# Patient Record
Sex: FEMALE | Race: WHITE | NOT HISPANIC OR LATINO | Employment: FULL TIME | URBAN - METROPOLITAN AREA
[De-identification: names, ages, dates, MRNs, and addresses within clinical notes are randomized per-mention and may not be internally consistent; named-entity substitution may affect disease eponyms.]

---

## 2019-03-26 ENCOUNTER — OFFICE VISIT (OUTPATIENT)
Dept: URGENT CARE | Facility: CLINIC | Age: 39
End: 2019-03-26
Payer: MEDICAID

## 2019-03-26 VITALS
HEART RATE: 103 BPM | TEMPERATURE: 99 F | SYSTOLIC BLOOD PRESSURE: 115 MMHG | BODY MASS INDEX: 22.2 KG/M2 | RESPIRATION RATE: 14 BRPM | DIASTOLIC BLOOD PRESSURE: 74 MMHG | OXYGEN SATURATION: 100 % | HEIGHT: 64 IN | WEIGHT: 130 LBS

## 2019-03-26 DIAGNOSIS — J02.9 SORE THROAT: ICD-10-CM

## 2019-03-26 DIAGNOSIS — R05.9 COUGH: ICD-10-CM

## 2019-03-26 DIAGNOSIS — R68.89 FLU-LIKE SYMPTOMS: Primary | ICD-10-CM

## 2019-03-26 LAB
CTP QC/QA: YES
CTP QC/QA: YES
FLUAV AG NPH QL: NEGATIVE
FLUBV AG NPH QL: NEGATIVE
S PYO RRNA THROAT QL PROBE: NEGATIVE

## 2019-03-26 PROCEDURE — 87804 POCT INFLUENZA A/B: ICD-10-PCS | Mod: 59,QW,S$GLB, | Performed by: PHYSICIAN ASSISTANT

## 2019-03-26 PROCEDURE — 87804 INFLUENZA ASSAY W/OPTIC: CPT | Mod: QW,S$GLB,, | Performed by: PHYSICIAN ASSISTANT

## 2019-03-26 PROCEDURE — 99203 PR OFFICE/OUTPT VISIT, NEW, LEVL III, 30-44 MIN: ICD-10-PCS | Mod: S$GLB,,, | Performed by: PHYSICIAN ASSISTANT

## 2019-03-26 PROCEDURE — 87880 STREP A ASSAY W/OPTIC: CPT | Mod: QW,S$GLB,, | Performed by: PHYSICIAN ASSISTANT

## 2019-03-26 PROCEDURE — 99203 OFFICE O/P NEW LOW 30 MIN: CPT | Mod: S$GLB,,, | Performed by: PHYSICIAN ASSISTANT

## 2019-03-26 PROCEDURE — 87880 POCT RAPID STREP A: ICD-10-PCS | Mod: QW,S$GLB,, | Performed by: PHYSICIAN ASSISTANT

## 2019-03-26 RX ORDER — FLUTICASONE PROPIONATE 50 MCG
2 SPRAY, SUSPENSION (ML) NASAL DAILY
Qty: 1 BOTTLE | Refills: 0 | Status: SHIPPED | OUTPATIENT
Start: 2019-03-26 | End: 2020-11-04

## 2019-03-26 NOTE — PATIENT INSTRUCTIONS
Make sure you are getting rest and drinking lots of fluids.    You can treat your symptoms with DayQuil maximum strength, NyQuil, Cepacol and/or cough drops.  You can also take Emergen-C to help boost your immune system.    You have been prescribed Flonase to help with nasal symptoms and post-nasal drip.    You can also take Ibuprofen for body aches/fever control.    Follow-up with primary care.    If for some reason your symptoms worsen or for any new or concerning symptoms, please return to this emergency room.        Influenza (Adult)    Influenza is also called the flu. It is a viral illness that affects the air passages of your lungs. It is different from the common cold. The flu can easily be passed from one to person to another. It may be spread through the air by coughing and sneezing. Or it can be spread by touching the sick person and then touching your own eyes, nose, or mouth.  The flu starts 1 to 3 days after you are exposed to the flu virus. It may last for 1 to 2 weeks but many people feel tired or fatigued for many weeks afterward. You usually dont need to take antibiotics unless you have a complication. This might be an ear or sinus infection or pneumonia.  Symptoms of the flu may be mild or severe. They can include extreme tiredness (wanting to stay in bed all day), chills, fevers, muscle aches, soreness with eye movement, headache, and a dry, hacking cough.  Home care  Follow these guidelines when caring for yourself at home:  · Avoid being around cigarette smoke, whether yours or other peoples.  · Acetaminophen or ibuprofen will help ease your fever, muscle aches, and headache. Dont give aspirin to anyone younger than 18 who has the flu. Aspirin can harm the liver.  · Nausea and loss of appetite are common with the flu. Eat light meals. Drink 6 to 8 glasses of liquids every day. Good choices are water, sport drinks, soft drinks without caffeine, juices, tea, and soup. Extra fluids will also  "help loosen secretions in your nose and lungs.  · Over-the-counter cold medicines will not make the flu go away faster. But the medicines may help with coughing, sore throat, and congestion in your nose and sinuses. Dont use a decongestant if you have high blood pressure.  · Stay home until your fever has been gone for at least 24 hours without using medicine to reduce fever.  Follow-up care  Follow up with your healthcare provider, or as advised, if you are not getting better over the next week.  If you are age 65 or older, talk with your provider about getting a pneumococcal vaccine every 5 years. You should also get this vaccine if you have chronic asthma or COPD. All adults should get a flu vaccine every fall. Ask your provider about this.  When to seek medical advice  Call your healthcare provider right away if any of these occur:  · Cough with lots of colored mucus (sputum) or blood in your mucus  · Chest pain, shortness of breath, wheezing, or trouble breathing  · Severe headache, or face, neck, or ear pain  · New rash with fever  · Fever of 100.4°F (38°C) or higher, or as directed by your healthcare provider  · Confusion, behavior change, or seizure  · Severe weakness or dizziness  · You get a new fever or cough after getting better for a few days  Date Last Reviewed: 1/1/2017  © 8319-5503 [employer]. [address] [place] [address], [place] [address]. All rights reserved. This information is not intended as a substitute for professional medical care. Always follow your healthcare professional's instructions.        Viral Syndrome (Adult)  A viral illness may cause a number of symptoms. The symptoms depend on the part of the body that the virus affects. If it settles in your nose, throat, and lungs, it may cause cough, sore throat, congestion, and sometimes headache. If it settles in your stomach and intestinal tract, it may cause vomiting and diarrhea. Sometimes it causes vague symptoms like "aching " "all over," feeling tired, loss of appetite, or fever.  A viral illness usually lasts 1 to 2 weeks, but sometimes it lasts longer. In some cases, a more serious infection can look like a viral syndrome in the first few days of the illness. You may need another exam and additional tests to know the difference. Watch for the warning signs listed below.  Home care  Follow these guidelines for taking care of yourself at home:  · If symptoms are severe, rest at home for the first 2 to 3 days.  · Stay away from cigarette smoke - both your smoke and the smoke from others.  · You may use over-the-counter acetaminophen or ibuprofen for fever, muscle aching, and headache, unless another medicine was prescribed for this. If you have chronic liver or kidney disease or ever had a stomach ulcer or GI bleeding, talk with your doctor before using these medicines. No one who is younger than 18 and ill with a fever should take aspirin. It may cause severe disease or death.  · Your appetite may be poor, so a light diet is fine. Avoid dehydration by drinking 8 to 12 8-ounce glasses of fluids each day. This may include water; orange juice; lemonade; apple, grape, and cranberry juice; clear fruit drinks; electrolyte replacement and sports drinks; and decaffeinated teas and coffee. If you have been diagnosed with a kidney disease, ask your doctor how much and what types of fluids you should drink to prevent dehydration. If you have kidney disease, drinking too much fluid can cause it build up in the your body and be dangerous to your health.  · Over-the-counter remedies won't shorten the length of the illness but may be helpful for cough, sore throat; and nasal and sinus congestion. Don't use decongestants if you have high blood pressure.  Follow-up care  Follow up with your healthcare provider if you do not improve over the next week.  Call 911  Get emergency medical care if any of the following occur:  · Convulsion  · Feeling weak, " dizzy, or like you are going to faint  · Chest pain, shortness of breath, wheezing, or difficulty breathing  When to seek medical advice  Call your healthcare provider right away if any of these occur:  · Cough with lots of colored sputum (mucus) or blood in your sputum  · Chest pain, shortness of breath, wheezing, or difficulty breathing  · Severe headache; face, neck, or ear pain  · Severe, constant pain in the lower right side of your belly (abdominal)  · Continued vomiting (cant keep liquids down)  · Frequent diarrhea (more than 5 times a day); blood (red or black color) or mucus in diarrhea  · Feeling weak, dizzy, or like you are going to faint  · Extreme thirst  · Fever of 100.4°F (38°C) or higher, or as directed by your healthcare provider  Date Last Reviewed: 9/25/2015  © 6861-8254 [employer]. [address] [place] [address]. All rights reserved. This information is not intended as a substitute for professional medical care. Always follow your healthcare professional's instructions.

## 2019-03-26 NOTE — PROGRESS NOTES
"Subjective:       Patient ID: Rhonda Ahuja is a 39 y.o. female.    Vitals:  height is 5' 4" (1.626 m) and weight is 59 kg (130 lb). Her oral temperature is 99 °F (37.2 °C). Her blood pressure is 115/74 and her pulse is 103. Her respiration is 14 and oxygen saturation is 100%.     Chief Complaint: Cough    Cough   This is a new problem. Episode onset: 3 days. The problem has been gradually worsening. The problem occurs every few minutes. The cough is non-productive. Associated symptoms include chills, ear congestion, ear pain, myalgias, nasal congestion, postnasal drip and a sore throat. Pertinent negatives include no chest pain, eye redness, fever, headaches, hemoptysis, rash, shortness of breath or wheezing. The symptoms are aggravated by lying down. Treatments tried: Tylenol Cold and Flu. The treatment provided mild relief.       Constitution: Positive for appetite change, chills and generalized weakness. Negative for sweating, fatigue and fever.   HENT: Positive for ear pain, congestion, postnasal drip, sinus pressure and sore throat. Negative for ear discharge, dental problem, drooling, tongue pain, tongue lesion, foreign body in nose, sinus pain and voice change.    Neck: Negative for neck pain and painful lymph nodes.   Cardiovascular: Negative for chest pain and palpitations.   Eyes: Negative for eye pain and eye redness.   Respiratory: Positive for cough. Negative for chest tightness, sputum production, bloody sputum, COPD, shortness of breath, stridor, wheezing and asthma.    Gastrointestinal: Negative for abdominal pain, nausea, vomiting, diarrhea and bright red blood in stool.   Genitourinary: Negative for dysuria, vaginal pain, vaginal discharge and vaginal bleeding.   Musculoskeletal: Positive for pain, back pain and muscle ache. Negative for trauma.   Skin: Negative for rash.   Allergic/Immunologic: Negative for seasonal allergies and asthma.   Neurological: Negative for headaches. "   Hematologic/Lymphatic: Negative for swollen lymph nodes.       Objective:      Physical Exam   Constitutional: She is oriented to person, place, and time. Vital signs are normal. She appears well-developed and well-nourished. She is cooperative.  Non-toxic appearance. She does not have a sickly appearance. She does not appear ill. No distress.   HENT:   Head: Normocephalic and atraumatic.   Right Ear: Tympanic membrane, external ear and ear canal normal.   Left Ear: Tympanic membrane, external ear and ear canal normal.   Nose: Nose normal. No mucosal edema, rhinorrhea or nasal deformity. No epistaxis. Right sinus exhibits no maxillary sinus tenderness and no frontal sinus tenderness. Left sinus exhibits no maxillary sinus tenderness and no frontal sinus tenderness.   Mouth/Throat: Uvula is midline and mucous membranes are normal. No trismus in the jaw. Normal dentition. No uvula swelling. Posterior oropharyngeal erythema present. No oropharyngeal exudate, posterior oropharyngeal edema or tonsillar abscesses. Tonsils are 0 on the right. Tonsils are 0 on the left. No tonsillar exudate.   + nasal congestion   Eyes: Pupils are equal, round, and reactive to light. Conjunctivae, EOM and lids are normal. Right eye exhibits no discharge. Left eye exhibits no discharge. No scleral icterus.   Sclera clear bilat   Neck: Trachea normal, normal range of motion, full passive range of motion without pain and phonation normal. Neck supple.   Cardiovascular: Normal rate, regular rhythm, normal heart sounds, intact distal pulses and normal pulses.   Pulmonary/Chest: Effort normal and breath sounds normal. No stridor. No respiratory distress. She has no decreased breath sounds. She has no wheezes. She has no rhonchi. She has no rales.   Abdominal: Soft. Normal appearance and bowel sounds are normal. She exhibits no distension, no pulsatile midline mass and no mass. There is no tenderness. There is no rigidity, no rebound, no  guarding and no CVA tenderness.   Musculoskeletal: Normal range of motion. She exhibits no edema or deformity.   Lymphadenopathy:     She has no cervical adenopathy.   Neurological: She is alert and oriented to person, place, and time. She has normal strength. No cranial nerve deficit or sensory deficit. She exhibits normal muscle tone. Coordination normal. GCS eye subscore is 4. GCS verbal subscore is 5. GCS motor subscore is 6.   Skin: Skin is warm, dry and intact. No rash noted. She is not diaphoretic. No pallor.   Psychiatric: She has a normal mood and affect. Her speech is normal and behavior is normal. Judgment and thought content normal. Cognition and memory are normal.   Nursing note and vitals reviewed.      Assessment:       1. Flu-like symptoms    2. Cough    3. Sore throat        Plan:         Flu-like symptoms    Cough  -     POCT Influenza A/B    Sore throat  -     POCT rapid strep A    Other orders  -     fluticasone (FLONASE) 50 mcg/actuation nasal spray; 2 sprays (100 mcg total) by Each Nare route once daily.  Dispense: 1 Bottle; Refill: 0      Results for orders placed or performed in visit on 03/26/19   POCT Influenza A/B   Result Value Ref Range    Rapid Influenza A Ag Negative Negative    Rapid Influenza B Ag Negative Negative     Acceptable Yes    POCT rapid strep A   Result Value Ref Range    Rapid Strep A Screen Negative Negative     Acceptable Yes       Patient's boyfriend had the flu 1 week ago.  Although influenza swab was negative today, I will treat flu-like symptoms due to exposure.  I will recommend OTC DayQuil, NyQuil, cough drops, Flonase and rest.  I will recommend increase fluid intake.  I will recommend follow-up with primary care.  I feel this patient is stable for discharge.    Make sure you are getting rest and drinking lots of fluids.    You can treat your symptoms with DayQuil maximum strength, NyQuil, Cepacol and/or cough drops.  You can also  take Emergen-C to help boost your immune system.    You have been prescribed Flonase to help with nasal symptoms and post-nasal drip.    You can also take Ibuprofen for body aches/fever control.    Follow-up with primary care.    If for some reason your symptoms worsen or for any new or concerning symptoms, please return to this emergency room.        Influenza (Adult)    Influenza is also called the flu. It is a viral illness that affects the air passages of your lungs. It is different from the common cold. The flu can easily be passed from one to person to another. It may be spread through the air by coughing and sneezing. Or it can be spread by touching the sick person and then touching your own eyes, nose, or mouth.  The flu starts 1 to 3 days after you are exposed to the flu virus. It may last for 1 to 2 weeks but many people feel tired or fatigued for many weeks afterward. You usually dont need to take antibiotics unless you have a complication. This might be an ear or sinus infection or pneumonia.  Symptoms of the flu may be mild or severe. They can include extreme tiredness (wanting to stay in bed all day), chills, fevers, muscle aches, soreness with eye movement, headache, and a dry, hacking cough.  Home care  Follow these guidelines when caring for yourself at home:  · Avoid being around cigarette smoke, whether yours or other peoples.  · Acetaminophen or ibuprofen will help ease your fever, muscle aches, and headache. Dont give aspirin to anyone younger than 18 who has the flu. Aspirin can harm the liver.  · Nausea and loss of appetite are common with the flu. Eat light meals. Drink 6 to 8 glasses of liquids every day. Good choices are water, sport drinks, soft drinks without caffeine, juices, tea, and soup. Extra fluids will also help loosen secretions in your nose and lungs.  · Over-the-counter cold medicines will not make the flu go away faster. But the medicines may help with coughing, sore throat,  "and congestion in your nose and sinuses. Dont use a decongestant if you have high blood pressure.  · Stay home until your fever has been gone for at least 24 hours without using medicine to reduce fever.  Follow-up care  Follow up with your healthcare provider, or as advised, if you are not getting better over the next week.  If you are age 65 or older, talk with your provider about getting a pneumococcal vaccine every 5 years. You should also get this vaccine if you have chronic asthma or COPD. All adults should get a flu vaccine every fall. Ask your provider about this.  When to seek medical advice  Call your healthcare provider right away if any of these occur:  · Cough with lots of colored mucus (sputum) or blood in your mucus  · Chest pain, shortness of breath, wheezing, or trouble breathing  · Severe headache, or face, neck, or ear pain  · New rash with fever  · Fever of 100.4°F (38°C) or higher, or as directed by your healthcare provider  · Confusion, behavior change, or seizure  · Severe weakness or dizziness  · You get a new fever or cough after getting better for a few days  Date Last Reviewed: 1/1/2017  © 7274-8270 [employer]. [address] [place] [address]. All rights reserved. This information is not intended as a substitute for professional medical care. Always follow your healthcare professional's instructions.        Viral Syndrome (Adult)  A viral illness may cause a number of symptoms. The symptoms depend on the part of the body that the virus affects. If it settles in your nose, throat, and lungs, it may cause cough, sore throat, congestion, and sometimes headache. If it settles in your stomach and intestinal tract, it may cause vomiting and diarrhea. Sometimes it causes vague symptoms like "aching all over," feeling tired, loss of appetite, or fever.  A viral illness usually lasts 1 to 2 weeks, but sometimes it lasts longer. In some cases, a more serious infection can " look like a viral syndrome in the first few days of the illness. You may need another exam and additional tests to know the difference. Watch for the warning signs listed below.  Home care  Follow these guidelines for taking care of yourself at home:  · If symptoms are severe, rest at home for the first 2 to 3 days.  · Stay away from cigarette smoke - both your smoke and the smoke from others.  · You may use over-the-counter acetaminophen or ibuprofen for fever, muscle aching, and headache, unless another medicine was prescribed for this. If you have chronic liver or kidney disease or ever had a stomach ulcer or GI bleeding, talk with your doctor before using these medicines. No one who is younger than 18 and ill with a fever should take aspirin. It may cause severe disease or death.  · Your appetite may be poor, so a light diet is fine. Avoid dehydration by drinking 8 to 12 8-ounce glasses of fluids each day. This may include water; orange juice; lemonade; apple, grape, and cranberry juice; clear fruit drinks; electrolyte replacement and sports drinks; and decaffeinated teas and coffee. If you have been diagnosed with a kidney disease, ask your doctor how much and what types of fluids you should drink to prevent dehydration. If you have kidney disease, drinking too much fluid can cause it build up in the your body and be dangerous to your health.  · Over-the-counter remedies won't shorten the length of the illness but may be helpful for cough, sore throat; and nasal and sinus congestion. Don't use decongestants if you have high blood pressure.  Follow-up care  Follow up with your healthcare provider if you do not improve over the next week.  Call 911  Get emergency medical care if any of the following occur:  · Convulsion  · Feeling weak, dizzy, or like you are going to faint  · Chest pain, shortness of breath, wheezing, or difficulty breathing  When to seek medical advice  Call your healthcare provider right away  if any of these occur:  · Cough with lots of colored sputum (mucus) or blood in your sputum  · Chest pain, shortness of breath, wheezing, or difficulty breathing  · Severe headache; face, neck, or ear pain  · Severe, constant pain in the lower right side of your belly (abdominal)  · Continued vomiting (cant keep liquids down)  · Frequent diarrhea (more than 5 times a day); blood (red or black color) or mucus in diarrhea  · Feeling weak, dizzy, or like you are going to faint  · Extreme thirst  · Fever of 100.4°F (38°C) or higher, or as directed by your healthcare provider  Date Last Reviewed: 9/25/2015  © 9669-7219 [employer]. [address] [place] [address], [place] [address]. All rights reserved. This information is not intended as a substitute for professional medical care. Always follow your healthcare professional's instructions.

## 2019-04-18 RX ORDER — FLUTICASONE PROPIONATE 50 MCG
SPRAY, SUSPENSION (ML) NASAL
Qty: 16 ML | Refills: 0 | OUTPATIENT
Start: 2019-04-18

## 2020-11-04 ENCOUNTER — OFFICE VISIT (OUTPATIENT)
Dept: OBSTETRICS AND GYNECOLOGY | Facility: CLINIC | Age: 40
End: 2020-11-04
Attending: OBSTETRICS & GYNECOLOGY
Payer: COMMERCIAL

## 2020-11-04 VITALS
WEIGHT: 131.06 LBS | SYSTOLIC BLOOD PRESSURE: 102 MMHG | DIASTOLIC BLOOD PRESSURE: 60 MMHG | HEIGHT: 64 IN | BODY MASS INDEX: 22.38 KG/M2

## 2020-11-04 DIAGNOSIS — Z01.419 ENCOUNTER FOR GYNECOLOGICAL EXAMINATION: Primary | ICD-10-CM

## 2020-11-04 DIAGNOSIS — Z12.4 SCREENING FOR CERVICAL CANCER: ICD-10-CM

## 2020-11-04 DIAGNOSIS — Z12.31 VISIT FOR SCREENING MAMMOGRAM: ICD-10-CM

## 2020-11-04 DIAGNOSIS — Z11.51 SCREENING FOR HPV (HUMAN PAPILLOMAVIRUS): ICD-10-CM

## 2020-11-04 PROCEDURE — 99999 PR PBB SHADOW E&M-EST. PATIENT-LVL III: ICD-10-PCS | Mod: PBBFAC,,, | Performed by: OBSTETRICS & GYNECOLOGY

## 2020-11-04 PROCEDURE — 99386 PREV VISIT NEW AGE 40-64: CPT | Mod: S$GLB,,, | Performed by: OBSTETRICS & GYNECOLOGY

## 2020-11-04 PROCEDURE — 87624 HPV HI-RISK TYP POOLED RSLT: CPT

## 2020-11-04 PROCEDURE — 99386 PR PREVENTIVE VISIT,NEW,40-64: ICD-10-PCS | Mod: S$GLB,,, | Performed by: OBSTETRICS & GYNECOLOGY

## 2020-11-04 PROCEDURE — 88175 CYTOPATH C/V AUTO FLUID REDO: CPT

## 2020-11-04 PROCEDURE — 99999 PR PBB SHADOW E&M-EST. PATIENT-LVL III: CPT | Mod: PBBFAC,,, | Performed by: OBSTETRICS & GYNECOLOGY

## 2020-11-04 NOTE — PROGRESS NOTES
Subjective:       Patient ID: Rhodna Ahuja is a 40 y.o. female.    Chief Complaint:  Well Woman (Reports last pap  normal. No mammogram. )      History of Present Illness  - here for annual. Regular periods. Monogamous relationship. Declines birth control. No complaints.    History reviewed. No pertinent past medical history.    Past Surgical History:   Procedure Laterality Date    BUNIONECTOMY       SECTION      x 2    TONSILLECTOMY         No current outpatient medications on file.    Review of patient's allergies indicates:  No Known Allergies    GYN & OB History  Patient's last menstrual period was 10/22/2020 (approximate).   Date of Last Pap: 2020    OB History    Para Term  AB Living   4 2 2   2 2   SAB TAB Ectopic Multiple Live Births   1       2      # Outcome Date GA Lbr Richar/2nd Weight Sex Delivery Anes PTL Lv   4 Term 13 39w0d   M CS[facility]   JOSELIN   3 Term 09 39w0d   F CS-[facility]pec   JOSELIN   2 SAB            1 AB               Obstetric Comments   Menarche age [age]       Social History     Socioeconomic History    Marital status: Single     Spouse name: Not on file    Number of children: Not on file    Years of education: Not on file    Highest education level: Not on file   Occupational History    Not on file   Social Needs    Financial resource strain: Not on file    Food insecurity     Worry: Not on file     Inability: Not on file    Transportation needs     Medical: Not on file     Non-medical: Not on file   Tobacco Use    Smoking status: Former Smoker    Smokeless tobacco: Never Used   Substance and Sexual Activity    Alcohol use: Not Currently     Frequency: Never    Drug use: Never    Sexual activity: Yes     Partners: Male     Birth control/protection: Coitus interruptus   Lifestyle    Physical activity     Days per week: Not on file     Minutes per session: Not on file    Stress: Not on file   Relationships    Social  "connections     Talks on phone: Not on file     Gets together: Not on file     Attends [employer] service: Not on file     Active member of club or organization: Not on file     Attends meetings of clubs or organizations: Not on file     Relationship status: Not on file   Other Topics Concern    Not on file   Social History Narrative    Not on file       Family History   Problem Relation Age of Onset    Hypertension Mother     Diabetes Mother     Coronary artery disease Father     No Known Problems Sister     No Known Problems Brother     Breast cancer Maternal Grandmother     Colon cancer Neg Hx     Ovarian cancer Neg Hx        Review of Systems  Review of Systems   Constitutional: Negative for chills and fever.   Gastrointestinal: Negative for abdominal pain, constipation, diarrhea, nausea and vomiting.   Genitourinary: Negative for dysuria, flank pain, frequency, hematuria, pelvic pain, urgency and vaginal discharge.   Musculoskeletal: Negative for back pain.   Skin: Negative for rash.   Neurological: Negative for headaches.   Answers for HPI/ROS submitted by the patient on 2020   Gynecologic exam  genital itching: No  genital lesions: No  genital odor: No  genital rash: No  missed menses: No  vaginal bleeding: No  Pregnant now?: No  anorexia: No  discolored urine: No  painful intercourse: No  Please select the characteristics of your discharge: : normal, clear  Vaginal bleeding: no bleeding  Sexual activity: sexually active  Partner with STD symptoms: no  Birth control: nothing  Menstrual history: regular  STD: No  abdominal surgery: No   section: Yes  Ectopic pregnancy: No  Endometriosis: No  herpes simplex: No  gynecological surgery: No  menorrhagia: No  metrorrhagia: No  miscarriage: Yes  ovarian cysts: No  perineal abscess: No  PID: No  terminated pregnancy: Yes  vaginosis: No     Objective:     Vitals:    20 1417   BP: 102/60   Weight: 59.5 kg (131 lb 1 oz)   Height: 5' 4" (1.626 " m)       Physical Exam:   Constitutional: She is oriented to person, place, and time. She appears well-developed and well-nourished.        Pulmonary/Chest: Right breast exhibits no mass, no nipple discharge, no skin change, no tenderness and no swelling. Left breast exhibits no mass, no nipple discharge, no skin change, no tenderness and no swelling. Breasts are symmetrical.        Abdominal: Soft. She exhibits no distension. There is no abdominal tenderness.     Genitourinary:    Vagina and uterus normal.   There is no tenderness or lesion on the right labia. There is no tenderness or lesion on the left labia. Cervix is normal. Right adnexum displays no mass, no tenderness and no fullness. Left adnexum displays no mass, no tenderness and no fullness. Additional cervical findings: pap smear donenegative for vaginal discharge          Musculoskeletal: Moves all extremeties.       Neurological: She is alert and oriented to person, place, and time.     Psychiatric: She has a normal mood and affect.        Assessment/ Plan:     Orders Placed This Encounter    HPV High Risk Genotypes, PCR    Mammo Digital Screening Bilat w/ Anshu    Liquid-Based Pap Smear, Screening       Rhonda was seen today for well woman.    Diagnoses and all orders for this visit:    Encounter for gynecological examination    Screening for cervical cancer  -     Liquid-Based Pap Smear, Screening    Screening for HPV (human papillomavirus)  -     HPV High Risk Genotypes, PCR    Visit for screening mammogram  -     Mammo Digital Screening Bilat w/ Anshu; Future        Follow up in about 1 year (around 11/4/2021) for annual exam.

## 2020-11-12 ENCOUNTER — APPOINTMENT (OUTPATIENT)
Dept: RADIOLOGY | Facility: OTHER | Age: 40
End: 2020-11-12
Attending: OBSTETRICS & GYNECOLOGY
Payer: COMMERCIAL

## 2020-11-12 VITALS — BODY MASS INDEX: 22.4 KG/M2 | HEIGHT: 64 IN | WEIGHT: 131.19 LBS

## 2020-11-12 DIAGNOSIS — Z12.31 VISIT FOR SCREENING MAMMOGRAM: ICD-10-CM

## 2020-11-12 PROCEDURE — 77067 SCR MAMMO BI INCL CAD: CPT | Mod: TC,PN

## 2020-11-12 PROCEDURE — 77067 SCR MAMMO BI INCL CAD: CPT | Mod: 26,,, | Performed by: RADIOLOGY

## 2020-11-12 PROCEDURE — 77063 BREAST TOMOSYNTHESIS BI: CPT | Mod: 26,,, | Performed by: RADIOLOGY

## 2020-11-12 PROCEDURE — 77063 MAMMO DIGITAL SCREENING BILAT WITH TOMO: ICD-10-PCS | Mod: 26,,, | Performed by: RADIOLOGY

## 2020-11-12 PROCEDURE — 77067 MAMMO DIGITAL SCREENING BILAT WITH TOMO: ICD-10-PCS | Mod: 26,,, | Performed by: RADIOLOGY

## 2020-11-13 LAB
HPV HR 12 DNA SPEC QL NAA+PROBE: NEGATIVE
HPV16 AG SPEC QL: NEGATIVE
HPV18 DNA SPEC QL NAA+PROBE: NEGATIVE

## 2020-11-19 ENCOUNTER — PATIENT MESSAGE (OUTPATIENT)
Dept: RADIOLOGY | Facility: OTHER | Age: 40
End: 2020-11-19

## 2020-11-20 ENCOUNTER — HOSPITAL ENCOUNTER (OUTPATIENT)
Dept: RADIOLOGY | Facility: HOSPITAL | Age: 40
Discharge: HOME | End: 2020-11-20
Attending: OBSTETRICS & GYNECOLOGY
Payer: COMMERCIAL

## 2020-11-20 DIAGNOSIS — R92.8 ABNORMAL MAMMOGRAM: ICD-10-CM

## 2020-11-20 PROCEDURE — 77065 DX MAMMO INCL CAD UNI: CPT | Mod: 26,RT,, | Performed by: RADIOLOGY

## 2020-11-20 PROCEDURE — 77065 MAMMO DIGITAL DIAGNOSTIC RIGHT WITH TOMO: ICD-10-PCS | Mod: 26,RT,, | Performed by: RADIOLOGY

## 2020-11-20 PROCEDURE — 77065 DX MAMMO INCL CAD UNI: CPT | Mod: TC,RT

## 2020-11-20 PROCEDURE — 77061 MAMMO DIGITAL DIAGNOSTIC RIGHT WITH TOMO: ICD-10-PCS | Mod: 26,RT,, | Performed by: RADIOLOGY

## 2020-11-20 PROCEDURE — 77061 BREAST TOMOSYNTHESIS UNI: CPT | Mod: 26,RT,, | Performed by: RADIOLOGY

## 2020-11-20 PROCEDURE — 77061 BREAST TOMOSYNTHESIS UNI: CPT | Mod: TC,RT

## 2020-12-11 LAB
FINAL PATHOLOGIC DIAGNOSIS: NORMAL
Lab: NORMAL

## 2021-04-13 ENCOUNTER — PATIENT MESSAGE (OUTPATIENT)
Dept: RESEARCH | Facility: HOSPITAL | Age: 41
End: 2021-04-13

## 2022-11-25 ENCOUNTER — OFFICE VISIT (OUTPATIENT)
Dept: URGENT CARE | Facility: CLINIC | Age: 42
End: 2022-11-25

## 2022-11-25 VITALS
BODY MASS INDEX: 22.36 KG/M2 | DIASTOLIC BLOOD PRESSURE: 70 MMHG | WEIGHT: 131 LBS | TEMPERATURE: 98 F | OXYGEN SATURATION: 100 % | SYSTOLIC BLOOD PRESSURE: 130 MMHG | HEIGHT: 64 IN | RESPIRATION RATE: 18 BRPM | HEART RATE: 68 BPM

## 2022-11-25 DIAGNOSIS — R06.02 SOB (SHORTNESS OF BREATH): ICD-10-CM

## 2022-11-25 DIAGNOSIS — R07.9 CHEST PAIN, UNSPECIFIED TYPE: Primary | ICD-10-CM

## 2022-11-25 PROCEDURE — 71046 XR CHEST PA AND LATERAL: ICD-10-PCS | Mod: FY,S$GLB,, | Performed by: RADIOLOGY

## 2022-11-25 PROCEDURE — 99204 OFFICE O/P NEW MOD 45 MIN: CPT | Mod: TIER,S$GLB,, | Performed by: PHYSICIAN ASSISTANT

## 2022-11-25 PROCEDURE — 93005 ELECTROCARDIOGRAM TRACING: CPT | Mod: S$GLB,,, | Performed by: NURSE PRACTITIONER

## 2022-11-25 PROCEDURE — 93005 EKG 12-LEAD: ICD-10-PCS | Mod: S$GLB,,, | Performed by: NURSE PRACTITIONER

## 2022-11-25 PROCEDURE — 93010 EKG 12-LEAD: ICD-10-PCS | Mod: S$GLB,,, | Performed by: INTERNAL MEDICINE

## 2022-11-25 PROCEDURE — 99204 PR OFFICE/OUTPT VISIT, NEW, LEVL IV, 45-59 MIN: ICD-10-PCS | Mod: TIER,S$GLB,, | Performed by: PHYSICIAN ASSISTANT

## 2022-11-25 PROCEDURE — 71046 X-RAY EXAM CHEST 2 VIEWS: CPT | Mod: FY,S$GLB,, | Performed by: RADIOLOGY

## 2022-11-25 PROCEDURE — 93010 ELECTROCARDIOGRAM REPORT: CPT | Mod: S$GLB,,, | Performed by: INTERNAL MEDICINE

## 2022-11-25 RX ORDER — HYDROXYZINE HYDROCHLORIDE 25 MG/1
25 TABLET, FILM COATED ORAL 3 TIMES DAILY PRN
Qty: 15 TABLET | Refills: 0 | Status: SHIPPED | OUTPATIENT
Start: 2022-11-25 | End: 2023-12-13

## 2022-11-25 NOTE — PROGRESS NOTES
"Subjective:       Patient ID: Rhonda Ahuja is a 42 y.o. female.    Vitals:  height is 5' 4" (1.626 m) and weight is 59.4 kg (131 lb). Her temperature is 98.2 °F (36.8 °C). Her blood pressure is 130/70 and her pulse is 68. Her respiration is 18 and oxygen saturation is 100%.     Chief Complaint: Chest Pain    Pt c/o chest pain and SOB that started today.  Pt reports that at approximately 10 am, she noticed a burning pain along her right upper back that began to radiate into her right shoulder and down her arm.  Pt states that afterwards she began to experience chest pain that radiated into her jaw.  She states that the chest pain was in the middle of her chest.  Pt practiced ROM exercises with her right shoulder but states that did not relieve the pain.  Pt reports that it felt as though someone was sitting on her chest, and it was difficult to get air.  Pt here with her  who states that the episode lasted about an hour.  Pt states she thought it may have been a panic attack but did not think that a panic attack could cause the symptoms she experienced.  Pt reports that she has had some stressors recently as her mother was diagnosed with ALS.    Chest Pain   This is a new problem. The current episode started today. The problem occurs every several days. The pain is at a severity of 2/10. Associated symptoms include shortness of breath. Pertinent negatives include no cough, diaphoresis, fever, nausea or vomiting.     Constitution: Negative for chills, sweating, fatigue and fever.   HENT:  Negative for congestion and sore throat.    Cardiovascular:  Positive for chest pain.   Respiratory:  Positive for shortness of breath. Negative for cough and wheezing.    Gastrointestinal:  Negative for nausea and vomiting.   Musculoskeletal:  Negative for muscle ache.   Neurological:  Negative for light-headedness and passing out.     Objective:      Physical Exam   Constitutional: She is oriented to person, place, and " time. She appears well-developed. She is cooperative.  Non-toxic appearance. She does not appear ill. No distress.   HENT:   Head: Normocephalic and atraumatic.   Ears:   Right Ear: Hearing and external ear normal.   Left Ear: Hearing and external ear normal.   Nose: Nose normal. No mucosal edema, rhinorrhea or nasal deformity. No epistaxis. Right sinus exhibits no maxillary sinus tenderness and no frontal sinus tenderness. Left sinus exhibits no maxillary sinus tenderness and no frontal sinus tenderness.   Eyes: Conjunctivae and lids are normal. Right eye exhibits no discharge. Left eye exhibits no discharge. No scleral icterus.   Neck: Trachea normal and phonation normal. Neck supple.   Cardiovascular: Normal rate, regular rhythm, normal heart sounds and normal pulses.   Pulmonary/Chest: Effort normal and breath sounds normal. No stridor. No respiratory distress. She has no decreased breath sounds. She has no wheezes. She has no rhonchi. She has no rales. She exhibits no tenderness and no bony tenderness.   Abdominal: Normal appearance and bowel sounds are normal. She exhibits no distension and no mass. Soft. There is no abdominal tenderness.   Musculoskeletal: Normal range of motion.         General: No deformity. Normal range of motion.      Right shoulder: She exhibits tenderness. She exhibits normal range of motion, no bony tenderness, no swelling and no deformity.        Arms:    Neurological: no focal deficit. She is alert and oriented to person, place, and time. No cranial nerve deficit. She exhibits normal muscle tone. Coordination normal.      Comments: CN's grossly intact   Skin: Skin is warm, dry, intact, not diaphoretic and not pale.   Psychiatric: Her speech is normal and behavior is normal. Judgment and thought content normal.   Nursing note and vitals reviewed.        EKG: there are no previous tracings available for comparison, normal sinus rhythm.  No ST segment elevation.    XR CHEST PA AND  LATERAL    Result Date: 11/25/2022  EXAMINATION: XR CHEST PA AND LATERAL CLINICAL HISTORY: Chest pain, unspecified TECHNIQUE: PA and lateral views of the chest were performed. COMPARISON: None FINDINGS: The lungs are clear, with normal appearance of pulmonary vasculature and no pleural effusion or pneumothorax. The cardiac silhouette is normal in size. The hilar and mediastinal contours are unremarkable. Bones are intact.     No acute abnormality. Electronically signed by: Larry Luther Date:    11/25/2022 Time:    17:00       Results reviewed with pt    Assessment:       1. Chest pain, unspecified type    2. SOB (shortness of breath)          Plan:         Chest pain, unspecified type  -     IN OFFICE EKG 12-LEAD (to [facility])  -     XR CHEST PA AND LATERAL; Future; Expected date: 11/25/2022  -     Ambulatory referral/consult to Family Practice    SOB (shortness of breath)  -     Ambulatory referral/consult to Family Practice  -     hydrOXYzine HCL (ATARAX) 25 MG tablet; Take 1 tablet (25 mg total) by mouth 3 (three) times daily as needed for Anxiety.  Dispense: 15 tablet; Refill: 0         Medical Decision Making:   History:   Old Records Summarized: records from clinic visits.  Initial Assessment:   42 y.o. female diagnosed with atypical chest pain and SOB.  No ST segment elevation on EKG.  Normal CXR.  Start Atarax prn for anxiety.  Strong ER precautions given.  Go to ER if worsening chest pain or SOB.  Follow up with PCP - family practice referral placed to establish care with PCP.  Differential Diagnosis:   Panic attack, musculoskeletal pain, early presentation of herpes zoster  Clinical Tests:   Radiological Study: Ordered and Reviewed  Medical Tests: Ordered and Reviewed  Urgent Care Management:  EKG - no ST segment elevation; no prior EKG tracing for comparison; normal rate of 72; normal rhythm.  Other:   I have discussed this case with another health care provider.       <> Summary of the Discussion: Case and  "plan discussed with Dr. Lieberman.       Patient Instructions   You must understand that you've received an Urgent Care treatment only and that you may be released before all your medical problems are known or treated. You, the patient, will arrange for follow up care as instructed.    Follow up with your PCP or specialty clinic as directed in the next 1-2 weeks if not improved or as needed. You can call [phone] to schedule an appointment with the appropriate provider.    If your condition worsens we recommend that you receive another evaluation at the emergency room immediately or contact your primary medical clinic's after hours call service to discuss your concerns.    Please go to the Emergency Department for any concerns or worsening of condition.       TODAY YOU WERE EVALUATED IN THE URGENT CARE FOR CHEST PAIN.   Although your EKG did not show an acute ST elevation MI, there are other types of "heart attack" and other serious causes for your Chest Pain. Further testing for other causes of your chest pain is beyond the scope of Urgent Care and if there is a concern, you should go to the ER for further evaluation. Based on your current signs and symptoms and diagnostic testing, it appears there is a low likelihood that this is due to a cardiac etiology, but it cannot be completely ruled out in the Urgent Care setting. If your chest pain persists or worsens and you develop additional symptoms such as Shortness of Breath, changes in mental status, profuse sweating or loss of consciousness, then you must go to the ER or call 911.      Patient Education        Chest Pain Discharge Instructions   About this topic   Chest pain is felt in the upper part of your body from your neck to your belly. You may feel pain, pressure, or tightness. Many things can cause chest pain. Some are serious things like heart disease or lung disease. Less serious things like stomach problems can also cause chest pain.  The doctors " may not be able to find all serious causes of chest pain the first time they see you. It is important that you follow up with your doctor. Treatment will depend on what is causing your chest pain     What care is needed at home?   Ask your doctor what you need to do when you go home. Make sure you ask questions if you do not understand what the doctor says.  Follow your regular doctors orders to keep your blood pressure, cholesterol, and high blood sugar (diabetes) under control.  If you smoke, try to quit. Your doctor or nurse can help.  Keep a healthy weight. If you are too heavy, lose weight.  Eat a healthy diet, as discussed with your doctor or nurse.  What follow-up care is needed?   Your doctor may ask you to make visits to the office to check on your progress. Be sure to keep these visits.  Your doctor will tell you if other tests are needed.  You doctor will tell you if you need to see a specialist like a heart doctor or cardiologist.  Your doctor may order a heart rehab program for you after you leave the hospital. This is an important part of your care. Share your discharge information with the rehab staff so they can plan a program to help you recover. Let your doctor know if you need help finding a program.  What drugs may be needed?   If chest pain is caused by a heart-related problem, the doctor may order drugs to:  Thin the blood  Dissolve a blood clot  Lower cholesterol  Lessen the work of your heart  Correct or prevent an abnormal heartbeat  Lower your blood pressure  Increase blood flow to the heart muscle  Relax the heart and help avoid spasms in the arteries  Check with your doctor before you take drugs like ibuprofen, naproxen, vitamin, or hormone replacement therapy.  If chest pain is caused by a something other than your heart, the doctor may order drugs to:  Help with pain  Treat stomach problems  Help with breathing  Help you relax  Control coughing  Will physical activity be limited?    Limit activities that can trigger chest pain.  You may need to be less active at first, and then slowly return to normal levels. Talk to your doctor about the right amount of activity for you.  What problems could happen?   Heart attack  Other heart problems  Blood clot in the lungs  When do I need to call the doctor?   Activate the emergency medical system right away if you have signs of a heart attack. Call 911 in the [place] or [place]. The sooner treatment begins, the better your chances for recovery. Call for emergency help right away if you have:  Signs of heart attack, which may include:  Severe chest pain, pressure, or discomfort with:  Breathing trouble; sweating; upset stomach; or cold, clammy skin.  Pain in your arms, back, or jaw.  Worse pain with activity like walking up stairs.  Fast or irregular heartbeat.  Feeling dizzy, faint, or weak.  Do not drive yourself to the hospital or have someone drive you. The emergency rescue people can begin to treat you the minute they arrive.     If you are to take nitroglycerin pills or spray with chest pain:  Rest. Sit or lie down.  Spray or place one pill under your tongue and let it melt.  If the pain is not better or is getting worse after 5 minutes, call for emergency help. Then take one more spray or pill under your tongue.  If the pain does not get better after another 3 to 5 minutes, take a third spray or pill under your tongue.  Drink a sip of water to wet your mouth if it is too dry to let the pills break down.  If nitroglycerin pills or spray have been ordered, always carry some with you and make sure they are not out of date. They need to be replaced 6 to 12 months after opening the bottle. Keep them in their original bottle and at room temperature. The pill should burn or tingle when you put it under your tongue. If it does not, it may need to be replaced.  Call your doctor if:  You have a fever of 100.4°F (38°C) or higher or chills.  You cough up  yellow or green mucus.  You are more tired than normal or more trouble breathing with activity.  Teach Back: Helping You Understand   The Teach Back Method helps you understand the information we are giving you. After you talk with the staff, tell them in your own words what you learned. This helps to make sure the staff has described each thing clearly. It also helps to explain things that may have been confusing. Before going home, make sure you can do these:  I can tell you about my pain.  I can tell you when I can go back to my normal activities.  I can tell you what I will do if I have signs of a heart attack.  Where can I learn more?   American Heart Association  http://www.heart.org/HEARTORG/Conditions/HeartAttack/AboutHeartAttacks/About-Heart-Attacks_UC_002038_Article.jsp   American Heart Association  http://www.heart.org/HEARTORG/Conditions/HeartAttack/SymptomsDiagnosisofHeartAttack/Angina-Chest-Pain_UC_450308_Article.jsp   FamilyDoctor.org  http://familydoctor.org/familydoctor/en/diseases-conditions/angina.printerview.all.html   NHS Choices  https://www.nhs.uk/conditions/chest-pain/   Last Reviewed Date   2021-06-16  Consumer Information Use and Disclaimer   This information is not specific medical advice and does not replace information you receive from your health care provider. This is only a brief summary of general information. It does NOT include all information about conditions, illnesses, injuries, tests, procedures, treatments, therapies, discharge instructions or life-style choices that may apply to you. You must talk with your health care provider for complete information about your health and treatment options. This information should not be used to decide whether or not to accept your health care providers advice, instructions or recommendations. Only your health care provider has the knowledge and training to provide advice that is right for you.  Copyright   Copyright © 2021 [employer],  [employer]. and its affiliates and/or licensors. All rights reserved.     Patient Education        Shortness of Breath (Dyspnea) Discharge Instructions   About this topic   Trouble breathing is known as dyspnea. It is also known as shortness of breath or SOB. You may feel like you do not get enough air when you breathe. Many things can make you feel short of breath. Lung problems, like asthma or chronic obstructive pulmonary disease (COPD) or a blood clot in your lungs, can cause shortness of breath. So can a heart attack or heart failure when your heart doesnt work as well as it should. A serious allergic reaction can also cause very bad breathing problems. Treatment depends on the specific cause of trouble breathing.     What care is needed at home?   Ask your doctor what you need to do when you go home. Make sure you ask questions if you do not understand what the doctor says. This way you will know what you need to do.  Keep a diary of your signs. Write down when you have trouble breathing and what you were doing before it happened. This can help figure out what things affect your breathing. Then, you may be able to avoid them.  Do not smoke or be in smoke-filled places. Avoid things that may cause breathing problems like fumes, pollution, dust, and other common allergens.  Do coughing and deep breathing exercises to help keep your lungs clear.  If you have medicines to take when you are feeling short of breath, be sure to carry them with you. Then, you can take them when needed.  What follow-up care is needed?   Your doctor may ask you to make visits to the office to check on your progress. Be sure to keep these visits.  Your doctor may order more tests if trouble breathing comes back. The results will help your doctor understand what health problem caused your SOB. Together you can make a plan for more care.  What drugs may be needed?   The doctor may order drugs to:  Open the lung passages  Relax the airways  Help  with swelling  Control coughing  Help you relax  Fight an infection  Prevent blood clots  Take all your drugs as directed by your doctor. Do not stop taking your drugs without talking to your doctor first. Do not share or take other drugs. Get refills before you run out.  Will physical activity be limited?   Ask your doctor about exercise. Some exercises may not be safe for you. Talk to your doctor about the right amount of activity for you.  Stay away from activities that make it hard to breathe. Get enough rest until breathing returns to normal.  What problems could happen?   Breathing problems get worse  Heart failure  Lung failure  What can be done to prevent this health problem?   Avoid tight clothing.  Practice good body posture.  Rest when you feel out of breath.  Take drugs before activity if ordered.  When do I need to call the doctor?   You have signs of a heart attack, which may include:  Severe chest pain, pressure, or discomfort with:  Breathing trouble; sweating; upset stomach; or cold, clammy skin.  Pain in your arms, back, or jaw.  Worse pain with activity like walking up stairs.  Fast or irregular heartbeat.  Feeling dizzy, faint, or weak.  You are having so much trouble breathing that you can only say one or two words at a time.  You need to sit upright at all times to be able to breathe or cannot lie down.  You develop swelling of your tongue, lips, or throat.  You feel your shortness of breath is slowly getting worse.  You are feeling weak or more short of breath than usual when doing your activities.  You have a fever of 100.4°F (38°C) or higher, are coughing up mucus, have leg swelling, or hives.  You have gain more than 2 to 3 pounds (1 to 1.5 kg) overnight or 3 to 5 pounds (1.5 to 2.5 kg) in a week.  Teach Back: Helping You Understand   The Teach Back Method helps you understand the information we are giving you. After you talk with the staff, tell them in your own words what you learned.  This helps to make sure the staff has described each thing clearly. It also helps to explain things that may have been confusing. Before going home, make sure you can do these:  I can tell you about my condition.  I can tell you what may help ease my breathing.  I can tell you what I can do to help avoid passing the infection to others.  I can tell you what I will do if I have trouble breathing, chest pain, weight gain, or swelling.  Where can I learn more?   FamilyDoctor.org  http://familydoctor.org/familydoctor/en/diseases-conditions/shortness-of-breath.html   NHS Choices  https://www.nhs.uk/conditions/shortness-of-breath/   Last Reviewed Date   2021-06-08  Consumer Information Use and Disclaimer   This information is not specific medical advice and does not replace information you receive from your health care provider. This is only a brief summary of general information. It does NOT include all information about conditions, illnesses, injuries, tests, procedures, treatments, therapies, discharge instructions or life-style choices that may apply to you. You must talk with your health care provider for complete information about your health and treatment options. This information should not be used to decide whether or not to accept your health care providers advice, instructions or recommendations. Only your health care provider has the knowledge and training to provide advice that is right for you.  Copyright   Copyright © 2021 [employer], Inc. and its affiliates and/or licensors. All rights reserved.     Patient Education        Panic Disorder Discharge Instructions   About this topic   Panic disorder is an illness where you have episodes of feeling very scared and nervous. These episodes are called panic attacks and generally last for a short period of time. A panic attack is when you have a sudden feeling of extreme fear for no clear reason. You may feel like you are losing control, having a heart attack, or  dying.  You may have some of these signs during a panic attack:  Chest pain  Dizziness  Stomachache  Fast heartbeat  Trouble breathing  Panic disorder may be treated with drugs and therapy.  What care is needed at home?   Ask your doctor what you need to do when you go home. Make sure you ask questions if you do not understand what your doctor says. This way you will know what you need to do.  Take all your drugs as ordered by your doctor.  Your doctor may suggest therapy. You will talk with a psychologist or counselor about how to prevent and deal with panic attacks.  Learn how to manage stress. Your doctor will teach you breathing and relaxation exercises.  Make your family and friends aware of your panic attacks and tell them how they can help you.  Your doctor may ask you to join support groups so you can get to know and learn from others who are living well with panic disorder.  Physical activities like sports may help you cope. Talk to your doctor about what activities will be good for you.  Balance your exercise with rest when you are tired.  Talk to your doctor about how much time you should spend in places that trigger an attack.  What follow-up care is needed?   This condition needs to be monitored closely. Your doctor may ask you to make visits to the office to check on your progress. Be sure to keep these visits.  What drugs may be needed?   The doctor may order drugs to:  Prevent panic attacks  Lower fear and anxiety  Help mood  What changes to diet are needed?   Eat a variety of healthy foods and limit drinks with caffeine. Avoid alcohol, energy drinks, and over-the-counter stimulants.  What problems could happen?   Ways of coping that are not healthy, such as drinking too much  Staying at home and not socializing  Feeling all alone  Problems focusing at work or school  Low mood  Less energy  What can be done to prevent this health problem?   Make social activities a part of your day.  Exercise  "regularly.  Try to sleep 8 hours every night.  Join a support group.  Learn what events, people, or subjects upset you.  Keep a journal or calendar to determine if there is a pattern.  Express your feelings. Talk to someone who can help you see how your thoughts in certain situations may raise your anxiety.  Seek support from your friends and family. Find someone who you feel calm around. Ask if you can call them when you are getting anxious.  When do I need to call the doctor?   Signs of low mood  More anxiety and worry  Helpful tips   Tips to help manage stress:  Pay attention to how you feel.  Allow yourself to cry.  Engage in activities that give you lane or satisfaction.  Ask other people for help if you need it.  Get involved with people you enjoy and who treat you well.  Do not take recreational drugs and drink alcohol beverages.  Do not let other things rule your life, like work or relationships.  Do not feel guilty when you have to say "no" to extra duties or tasks.  Give some of your time to help other people.  Exercise regularly.  Limit your time with people or things that cause extra stress.  Practice relaxation or breathing exercises.  Accept what you cannot change.  Teach Back: Helping You Understand   The Teach Back Method helps you understand the information we are giving you. The idea is simple. After talking with the staff, tell them in your own words what you were just told. This helps to make sure the staff has covered each thing clearly. It also helps to explain things that may have been a bit confusing. Before going home, make sure you are able to do these:  I can tell you about my condition.  I can tell you what may help ease my stress.  I can tell you what I will do if I have a low mood or more anxiety and worry.  Where can I learn more?   Better Health Choices  https://www.betterhealth.[place].gov.au/health/ConditionsAndTreatments/panic-disorder-and-agoraphobia   [employer] [place] [employer] " Health  http://www.nimh.nih.gov/health/topics/panic-disorder/index.shtml   NHS Choices  https://www.nhs.uk/conditions/panic-disorder/   Last Reviewed Date   2020-06-17  Consumer Information Use and Disclaimer   This information is not specific medical advice and does not replace information you receive from your health care provider. This is only a brief summary of general information. It does NOT include all information about conditions, illnesses, injuries, tests, procedures, treatments, therapies, discharge instructions or life-style choices that may apply to you. You must talk with your health care provider for complete information about your health and treatment options. This information should not be used to decide whether or not to accept your health care providers advice, instructions or recommendations. Only your health care provider has the knowledge and training to provide advice that is right for you.  Copyright   Copyright © 2021 [employer] and its affiliates and/or licensors. All rights reserved.

## 2022-11-25 NOTE — PATIENT INSTRUCTIONS
"You must understand that you've received an Urgent Care treatment only and that you may be released before all your medical problems are known or treated. You, the patient, will arrange for follow up care as instructed.    Follow up with your PCP or specialty clinic as directed in the next 1-2 weeks if not improved or as needed. You can call [phone] to schedule an appointment with the appropriate provider.    If your condition worsens we recommend that you receive another evaluation at the emergency room immediately or contact your primary medical clinic's after hours call service to discuss your concerns.    Please go to the Emergency Department for any concerns or worsening of condition.       TODAY YOU WERE EVALUATED IN THE URGENT CARE FOR CHEST PAIN.   Although your EKG did not show an acute ST elevation MI, there are other types of "heart attack" and other serious causes for your Chest Pain. Further testing for other causes of your chest pain is beyond the scope of Urgent Care and if there is a concern, you should go to the ER for further evaluation. Based on your current signs and symptoms and diagnostic testing, it appears there is a low likelihood that this is due to a cardiac etiology, but it cannot be completely ruled out in the Urgent Care setting. If your chest pain persists or worsens and you develop additional symptoms such as Shortness of Breath, changes in mental status, profuse sweating or loss of consciousness, then you must go to the ER or call 911.      "

## 2022-11-25 NOTE — PROGRESS NOTES
Subjective:       Patient ID: Rhonda Ahuja is a 42 y.o. female.    Chief Complaint: No chief complaint on file.    HPI  ROS     Objective:      Physical Exam    Assessment:       No diagnosis found.    Plan:                   No follow-ups on file.

## 2022-11-28 ENCOUNTER — TELEPHONE (OUTPATIENT)
Dept: URGENT CARE | Facility: CLINIC | Age: 42
End: 2022-11-28
Payer: COMMERCIAL

## 2022-11-28 DIAGNOSIS — R94.31 ABNORMAL EKG: Primary | ICD-10-CM

## 2022-11-28 NOTE — TELEPHONE ENCOUNTER
EKG abnormal showing first-degree AV block and possible anterior infarct.  Referral for Cardiology laced.  Message left for call back.

## 2023-09-23 ENCOUNTER — HOSPITAL ENCOUNTER (INPATIENT)
Facility: HOSPITAL | Age: 43
LOS: 2 days | Discharge: HOME | DRG: 024 | End: 2023-09-25
Attending: EMERGENCY MEDICINE | Admitting: PSYCHIATRY & NEUROLOGY
Payer: MEDICAID

## 2023-09-23 ENCOUNTER — ANESTHESIA EVENT (OUTPATIENT)
Dept: INTERVENTIONAL RADIOLOGY/VASCULAR | Facility: HOSPITAL | Age: 43
DRG: 024 | End: 2023-09-23
Payer: MEDICAID

## 2023-09-23 DIAGNOSIS — I63.512 ACUTE ISCHEMIC LEFT MCA STROKE: ICD-10-CM

## 2023-09-23 DIAGNOSIS — I63.9 STROKE: ICD-10-CM

## 2023-09-23 DIAGNOSIS — I63.412 EMBOLIC STROKE INVOLVING LEFT MIDDLE CEREBRAL ARTERY: Primary | ICD-10-CM

## 2023-09-23 PROBLEM — R47.01 APHASIA: Status: ACTIVE | Noted: 2023-09-23

## 2023-09-23 LAB
ALBUMIN SERPL BCP-MCNC: 4.4 G/DL (ref 3.5–5.2)
ALP SERPL-CCNC: 54 U/L (ref 55–135)
ALT SERPL W/O P-5'-P-CCNC: 18 U/L (ref 10–44)
AMPHET+METHAMPHET UR QL: NEGATIVE
ANION GAP SERPL CALC-SCNC: 7 MMOL/L (ref 8–16)
APAP SERPL-MCNC: <3 UG/ML (ref 10–20)
AST SERPL-CCNC: 19 U/L (ref 10–40)
B-HCG UR QL: NEGATIVE
BARBITURATES UR QL SCN>200 NG/ML: NEGATIVE
BASOPHILS # BLD AUTO: 0.07 K/UL (ref 0–0.2)
BASOPHILS NFR BLD: 1.1 % (ref 0–1.9)
BENZODIAZ UR QL SCN>200 NG/ML: NEGATIVE
BILIRUB SERPL-MCNC: 0.4 MG/DL (ref 0.1–1)
BILIRUB UR QL STRIP: NEGATIVE
BUN SERPL-MCNC: 20 MG/DL (ref 6–20)
BZE UR QL SCN: NEGATIVE
CALCIUM SERPL-MCNC: 9.8 MG/DL (ref 8.7–10.5)
CANNABINOIDS UR QL SCN: NEGATIVE
CHLORIDE SERPL-SCNC: 108 MMOL/L (ref 95–110)
CHOLEST SERPL-MCNC: 277 MG/DL (ref 120–199)
CHOLEST/HDLC SERPL: 5 {RATIO} (ref 2–5)
CLARITY UR REFRACT.AUTO: CLEAR
CO2 SERPL-SCNC: 24 MMOL/L (ref 23–29)
COLOR UR AUTO: COLORLESS
CREAT SERPL-MCNC: 0.8 MG/DL (ref 0.5–1.4)
CREAT SERPL-MCNC: 0.8 MG/DL (ref 0.5–1.4)
CREAT UR-MCNC: 35 MG/DL (ref 15–325)
CREAT UR-MCNC: 35 MG/DL (ref 15–325)
CTP QC/QA: YES
DIFFERENTIAL METHOD: ABNORMAL
EOSINOPHIL # BLD AUTO: 0.1 K/UL (ref 0–0.5)
EOSINOPHIL NFR BLD: 1.8 % (ref 0–8)
ERYTHROCYTE [DISTWIDTH] IN BLOOD BY AUTOMATED COUNT: 12.4 % (ref 11.5–14.5)
EST. GFR  (NO RACE VARIABLE): >60 ML/MIN/1.73 M^2
ETHANOL SERPL-MCNC: <10 MG/DL
FENTANYL UR QL SCN: NEGATIVE
GLUCOSE SERPL-MCNC: 113 MG/DL (ref 70–110)
GLUCOSE UR QL STRIP: NEGATIVE
HCT VFR BLD AUTO: 39.3 % (ref 37–48.5)
HDLC SERPL-MCNC: 55 MG/DL (ref 40–75)
HDLC SERPL: 19.9 % (ref 20–50)
HGB BLD-MCNC: 13.5 G/DL (ref 12–16)
HGB UR QL STRIP: NEGATIVE
IMM GRANULOCYTES # BLD AUTO: 0.02 K/UL (ref 0–0.04)
IMM GRANULOCYTES NFR BLD AUTO: 0.3 % (ref 0–0.5)
INR PPP: 0.9 (ref 0.8–1.2)
KETONES UR QL STRIP: NEGATIVE
LDLC SERPL CALC-MCNC: 200 MG/DL (ref 63–159)
LEUKOCYTE ESTERASE UR QL STRIP: NEGATIVE
LYMPHOCYTES # BLD AUTO: 1.9 K/UL (ref 1–4.8)
LYMPHOCYTES NFR BLD: 29.1 % (ref 18–48)
MCH RBC QN AUTO: 31.5 PG (ref 27–31)
MCHC RBC AUTO-ENTMCNC: 34.4 G/DL (ref 32–36)
MCV RBC AUTO: 92 FL (ref 82–98)
METHADONE UR QL SCN>300 NG/ML: NEGATIVE
MONOCYTES # BLD AUTO: 0.6 K/UL (ref 0.3–1)
MONOCYTES NFR BLD: 8.4 % (ref 4–15)
NEUTROPHILS # BLD AUTO: 4 K/UL (ref 1.8–7.7)
NEUTROPHILS NFR BLD: 59.3 % (ref 38–73)
NITRITE UR QL STRIP: NEGATIVE
NONHDLC SERPL-MCNC: 222 MG/DL
NRBC BLD-RTO: 0 /100 WBC
OPIATES UR QL SCN: NEGATIVE
PCP UR QL SCN>25 NG/ML: NEGATIVE
PH UR STRIP: 6 [PH] (ref 5–8)
PLATELET # BLD AUTO: 318 K/UL (ref 150–450)
PMV BLD AUTO: 9.7 FL (ref 9.2–12.9)
POC PTINR: 1.1 (ref 0.9–1.2)
POC PTWBT: 13.6 SEC (ref 9.7–14.3)
POCT GLUCOSE: 119 MG/DL (ref 70–110)
POTASSIUM SERPL-SCNC: 4.1 MMOL/L (ref 3.5–5.1)
PROT SERPL-MCNC: 7.9 G/DL (ref 6–8.4)
PROT UR QL STRIP: ABNORMAL
PROTHROMBIN TIME: 10 SEC (ref 9–12.5)
RBC # BLD AUTO: 4.29 M/UL (ref 4–5.4)
SAMPLE: NORMAL
SAMPLE: NORMAL
SODIUM SERPL-SCNC: 139 MMOL/L (ref 136–145)
SP GR UR STRIP: >1.03 (ref 1–1.03)
TOXICOLOGY INFORMATION: NORMAL
TRIGL SERPL-MCNC: 110 MG/DL (ref 30–150)
TSH SERPL DL<=0.005 MIU/L-ACNC: 1.67 UIU/ML (ref 0.4–4)
URN SPEC COLLECT METH UR: ABNORMAL
WBC # BLD AUTO: 6.66 K/UL (ref 3.9–12.7)

## 2023-09-23 PROCEDURE — 85025 COMPLETE CBC W/AUTO DIFF WBC: CPT

## 2023-09-23 PROCEDURE — 80061 LIPID PANEL: CPT

## 2023-09-23 PROCEDURE — 85610 PROTHROMBIN TIME: CPT

## 2023-09-23 PROCEDURE — 63600175 PHARM REV CODE 636 W HCPCS

## 2023-09-23 PROCEDURE — 25000003 PHARM REV CODE 250: Performed by: STUDENT IN AN ORGANIZED HEALTH CARE EDUCATION/TRAINING PROGRAM

## 2023-09-23 PROCEDURE — 82962 GLUCOSE BLOOD TEST: CPT

## 2023-09-23 PROCEDURE — 25000003 PHARM REV CODE 250

## 2023-09-23 PROCEDURE — 25500020 PHARM REV CODE 255: Performed by: EMERGENCY MEDICINE

## 2023-09-23 PROCEDURE — 80053 COMPREHEN METABOLIC PANEL: CPT

## 2023-09-23 PROCEDURE — 80143 DRUG ASSAY ACETAMINOPHEN: CPT

## 2023-09-23 PROCEDURE — 20000000 HC ICU ROOM

## 2023-09-23 PROCEDURE — 63600175 PHARM REV CODE 636 W HCPCS: Performed by: NURSE ANESTHETIST, CERTIFIED REGISTERED

## 2023-09-23 PROCEDURE — 84443 ASSAY THYROID STIM HORMONE: CPT

## 2023-09-23 PROCEDURE — 82077 ASSAY SPEC XCP UR&BREATH IA: CPT

## 2023-09-23 PROCEDURE — D9220A PRA ANESTHESIA: ICD-10-PCS | Mod: ANES,,, | Performed by: ANESTHESIOLOGY

## 2023-09-23 PROCEDURE — 63600175 PHARM REV CODE 636 W HCPCS: Performed by: STUDENT IN AN ORGANIZED HEALTH CARE EDUCATION/TRAINING PROGRAM

## 2023-09-23 PROCEDURE — 25500020 PHARM REV CODE 255: Performed by: PSYCHIATRY & NEUROLOGY

## 2023-09-23 PROCEDURE — 82565 ASSAY OF CREATININE: CPT

## 2023-09-23 PROCEDURE — 99291 CRITICAL CARE FIRST HOUR: CPT | Mod: ,,,

## 2023-09-23 PROCEDURE — 93005 ELECTROCARDIOGRAM TRACING: CPT

## 2023-09-23 PROCEDURE — 93010 EKG 12-LEAD: ICD-10-PCS | Mod: ,,, | Performed by: INTERNAL MEDICINE

## 2023-09-23 PROCEDURE — D9220A PRA ANESTHESIA: Mod: ANES,,, | Performed by: ANESTHESIOLOGY

## 2023-09-23 PROCEDURE — 25000003 PHARM REV CODE 250: Performed by: NURSE ANESTHETIST, CERTIFIED REGISTERED

## 2023-09-23 PROCEDURE — D9220A PRA ANESTHESIA: Mod: CRNA,,, | Performed by: NURSE ANESTHETIST, CERTIFIED REGISTERED

## 2023-09-23 PROCEDURE — 94761 N-INVAS EAR/PLS OXIMETRY MLT: CPT

## 2023-09-23 PROCEDURE — 93010 ELECTROCARDIOGRAM REPORT: CPT | Mod: ,,, | Performed by: INTERNAL MEDICINE

## 2023-09-23 PROCEDURE — 81025 URINE PREGNANCY TEST: CPT

## 2023-09-23 PROCEDURE — 81003 URINALYSIS AUTO W/O SCOPE: CPT | Mod: 59

## 2023-09-23 PROCEDURE — 80354 DRUG SCREENING FENTANYL: CPT

## 2023-09-23 PROCEDURE — 99291 PR CRITICAL CARE, E/M 30-74 MINUTES: ICD-10-PCS | Mod: ,,,

## 2023-09-23 PROCEDURE — 80307 DRUG TEST PRSMV CHEM ANLYZR: CPT

## 2023-09-23 PROCEDURE — D9220A PRA ANESTHESIA: ICD-10-PCS | Mod: CRNA,,, | Performed by: NURSE ANESTHETIST, CERTIFIED REGISTERED

## 2023-09-23 PROCEDURE — 99900035 HC TECH TIME PER 15 MIN (STAT)

## 2023-09-23 PROCEDURE — 99285 EMERGENCY DEPT VISIT HI MDM: CPT | Mod: 25

## 2023-09-23 RX ORDER — OXYCODONE HYDROCHLORIDE 5 MG/1
5 TABLET ORAL
Status: CANCELLED | OUTPATIENT
Start: 2023-09-23

## 2023-09-23 RX ORDER — ONDANSETRON 2 MG/ML
INJECTION INTRAMUSCULAR; INTRAVENOUS
Status: COMPLETED
Start: 2023-09-23 | End: 2023-09-23

## 2023-09-23 RX ORDER — GABAPENTIN 300 MG/1
300 CAPSULE ORAL 3 TIMES DAILY
Status: DISCONTINUED | OUTPATIENT
Start: 2023-09-23 | End: 2023-09-24

## 2023-09-23 RX ORDER — OXYCODONE HYDROCHLORIDE 5 MG/1
5 TABLET ORAL EVERY 6 HOURS PRN
Status: DISCONTINUED | OUTPATIENT
Start: 2023-09-23 | End: 2023-09-25 | Stop reason: HOSPADM

## 2023-09-23 RX ORDER — SODIUM CHLORIDE 0.9 % (FLUSH) 0.9 %
10 SYRINGE (ML) INJECTION
Status: DISCONTINUED | OUTPATIENT
Start: 2023-09-23 | End: 2023-09-25

## 2023-09-23 RX ORDER — HYDRALAZINE HYDROCHLORIDE 20 MG/ML
10 INJECTION INTRAMUSCULAR; INTRAVENOUS EVERY 4 HOURS PRN
Status: DISCONTINUED | OUTPATIENT
Start: 2023-09-23 | End: 2023-09-25 | Stop reason: HOSPADM

## 2023-09-23 RX ORDER — MIDAZOLAM HYDROCHLORIDE 1 MG/ML
INJECTION, SOLUTION INTRAMUSCULAR; INTRAVENOUS
Status: DISCONTINUED | OUTPATIENT
Start: 2023-09-23 | End: 2023-09-23

## 2023-09-23 RX ORDER — AMOXICILLIN 250 MG
1 CAPSULE ORAL 2 TIMES DAILY
Status: DISCONTINUED | OUTPATIENT
Start: 2023-09-23 | End: 2023-09-25 | Stop reason: HOSPADM

## 2023-09-23 RX ORDER — ROCURONIUM BROMIDE 10 MG/ML
INJECTION, SOLUTION INTRAVENOUS
Status: DISCONTINUED | OUTPATIENT
Start: 2023-09-23 | End: 2023-09-23

## 2023-09-23 RX ORDER — HYDROMORPHONE HYDROCHLORIDE 1 MG/ML
0.2 INJECTION, SOLUTION INTRAMUSCULAR; INTRAVENOUS; SUBCUTANEOUS EVERY 5 MIN PRN
Status: CANCELLED | OUTPATIENT
Start: 2023-09-23

## 2023-09-23 RX ORDER — METHOCARBAMOL 500 MG/1
500 TABLET, FILM COATED ORAL 4 TIMES DAILY
Status: DISCONTINUED | OUTPATIENT
Start: 2023-09-23 | End: 2023-09-24

## 2023-09-23 RX ORDER — LIDOCAINE HYDROCHLORIDE 20 MG/ML
INJECTION INTRAVENOUS
Status: DISCONTINUED | OUTPATIENT
Start: 2023-09-23 | End: 2023-09-23

## 2023-09-23 RX ORDER — ATORVASTATIN CALCIUM 40 MG/1
40 TABLET, FILM COATED ORAL DAILY
Status: DISCONTINUED | OUTPATIENT
Start: 2023-09-23 | End: 2023-09-25 | Stop reason: HOSPADM

## 2023-09-23 RX ORDER — HALOPERIDOL 5 MG/ML
0.5 INJECTION INTRAMUSCULAR EVERY 10 MIN PRN
Status: CANCELLED | OUTPATIENT
Start: 2023-09-23

## 2023-09-23 RX ORDER — LABETALOL HCL 20 MG/4 ML
10 SYRINGE (ML) INTRAVENOUS EVERY 4 HOURS PRN
Status: DISCONTINUED | OUTPATIENT
Start: 2023-09-23 | End: 2023-09-25 | Stop reason: HOSPADM

## 2023-09-23 RX ORDER — ACETAMINOPHEN 325 MG/1
650 TABLET ORAL EVERY 6 HOURS PRN
Status: DISCONTINUED | OUTPATIENT
Start: 2023-09-23 | End: 2023-09-25 | Stop reason: HOSPADM

## 2023-09-23 RX ORDER — METOCLOPRAMIDE HYDROCHLORIDE 5 MG/ML
10 INJECTION INTRAMUSCULAR; INTRAVENOUS ONCE
Status: COMPLETED | OUTPATIENT
Start: 2023-09-23 | End: 2023-09-23

## 2023-09-23 RX ORDER — PROPOFOL 10 MG/ML
VIAL (ML) INTRAVENOUS
Status: DISCONTINUED | OUTPATIENT
Start: 2023-09-23 | End: 2023-09-23

## 2023-09-23 RX ORDER — ONDANSETRON 2 MG/ML
8 INJECTION INTRAMUSCULAR; INTRAVENOUS EVERY 6 HOURS PRN
Status: DISCONTINUED | OUTPATIENT
Start: 2023-09-23 | End: 2023-09-25 | Stop reason: HOSPADM

## 2023-09-23 RX ORDER — PROCHLORPERAZINE EDISYLATE 5 MG/ML
5 INJECTION INTRAMUSCULAR; INTRAVENOUS EVERY 30 MIN PRN
Status: CANCELLED | OUTPATIENT
Start: 2023-09-23

## 2023-09-23 RX ORDER — FENTANYL CITRATE 50 UG/ML
INJECTION, SOLUTION INTRAMUSCULAR; INTRAVENOUS
Status: DISCONTINUED | OUTPATIENT
Start: 2023-09-23 | End: 2023-09-23

## 2023-09-23 RX ORDER — MAGNESIUM SULFATE HEPTAHYDRATE 40 MG/ML
2 INJECTION, SOLUTION INTRAVENOUS ONCE
Status: COMPLETED | OUTPATIENT
Start: 2023-09-23 | End: 2023-09-23

## 2023-09-23 RX ORDER — SUCCINYLCHOLINE CHLORIDE 20 MG/ML
INJECTION INTRAMUSCULAR; INTRAVENOUS
Status: DISCONTINUED | OUTPATIENT
Start: 2023-09-23 | End: 2023-09-23

## 2023-09-23 RX ORDER — FENTANYL CITRATE 50 UG/ML
25 INJECTION, SOLUTION INTRAMUSCULAR; INTRAVENOUS EVERY 5 MIN PRN
Status: CANCELLED | OUTPATIENT
Start: 2023-09-23

## 2023-09-23 RX ORDER — IODIXANOL 320 MG/ML
100 INJECTION, SOLUTION INTRAVASCULAR
Status: COMPLETED | OUTPATIENT
Start: 2023-09-23 | End: 2023-09-23

## 2023-09-23 RX ADMIN — METHOCARBAMOL 500 MG: 500 TABLET ORAL at 09:09

## 2023-09-23 RX ADMIN — PROMETHAZINE HYDROCHLORIDE 25 MG: 25 INJECTION INTRAMUSCULAR; INTRAVENOUS at 12:09

## 2023-09-23 RX ADMIN — METOCLOPRAMIDE 10 MG: 5 INJECTION, SOLUTION INTRAMUSCULAR; INTRAVENOUS at 12:09

## 2023-09-23 RX ADMIN — MIDAZOLAM HYDROCHLORIDE 2 MG: 1 INJECTION, SOLUTION INTRAMUSCULAR; INTRAVENOUS at 10:09

## 2023-09-23 RX ADMIN — METHOCARBAMOL 500 MG: 500 TABLET ORAL at 05:09

## 2023-09-23 RX ADMIN — SUCCINYLCHOLINE CHLORIDE 120 MG: 20 INJECTION, SOLUTION INTRAMUSCULAR; INTRAVENOUS at 11:09

## 2023-09-23 RX ADMIN — ONDANSETRON 8 MG: 2 INJECTION INTRAMUSCULAR; INTRAVENOUS at 12:09

## 2023-09-23 RX ADMIN — PROPOFOL 200 MG: 10 INJECTION, EMULSION INTRAVENOUS at 11:09

## 2023-09-23 RX ADMIN — ACETAMINOPHEN 650 MG: 325 TABLET ORAL at 09:09

## 2023-09-23 RX ADMIN — MAGNESIUM SULFATE HEPTAHYDRATE 2 G: 40 INJECTION, SOLUTION INTRAVENOUS at 01:09

## 2023-09-23 RX ADMIN — LIDOCAINE HYDROCHLORIDE 75 MG: 20 INJECTION INTRAVENOUS at 11:09

## 2023-09-23 RX ADMIN — ROCURONIUM BROMIDE 10 MG: 10 INJECTION INTRAVENOUS at 11:09

## 2023-09-23 RX ADMIN — GABAPENTIN 300 MG: 300 CAPSULE ORAL at 09:09

## 2023-09-23 RX ADMIN — IOHEXOL 100 ML: 350 INJECTION, SOLUTION INTRAVENOUS at 09:09

## 2023-09-23 RX ADMIN — SODIUM CHLORIDE 1000 ML: 9 INJECTION, SOLUTION INTRAVENOUS at 10:09

## 2023-09-23 RX ADMIN — GABAPENTIN 300 MG: 300 CAPSULE ORAL at 05:09

## 2023-09-23 RX ADMIN — IODIXANOL 100 ML: 320 INJECTION, SOLUTION INTRAVASCULAR at 11:09

## 2023-09-23 RX ADMIN — FENTANYL CITRATE 100 MCG: 50 INJECTION, SOLUTION INTRAMUSCULAR; INTRAVENOUS at 11:09

## 2023-09-23 RX ADMIN — SODIUM CHLORIDE 500 ML: 9 INJECTION, SOLUTION INTRAVENOUS at 01:09

## 2023-09-23 NOTE — NURSING
CT 1st and 2nd floor called no answer. RN called ED. CT tech informed nurse that only 2 techs are available and one Tech will go to the 1st floor. Informed RN to contact 1st floor CT in 15mins.

## 2023-09-23 NOTE — ASSESSMENT & PLAN NOTE
43 y.o. female presents with left MCA CVA s/p thrombectomy. No TNK. TICI 2c. Initial NIH 3. NIH on admission 1.   - Admit to [facility]  - Vascular Neurology consulted   - Initial CTA shows L M1 LVO  - MRI Brain W/O Contrast obtained in ED showing findings in keeping with recent infarction involving the left frontal cortex and subcortical white matter as well as involving the left centrum semiovale and corona radiata.     - Post-IR CT ordered and pending [id number] as there was some intra-procedural contrast extravasation  - q1 neuro checks, vitals, I/Os  - Utox, UA  - EKG, Echo, and CXR pending  - A1c, TSH, and lipid panel pending  - aPTT, PT/INR pending  - CBC, CMP, Mag, and phos daily  - SBP goal < 140  - PRN labetalol and hydralazine  - Statin   - Repeat CT ordered for tomorrow at 0800 for follow up   - Will plan to start ASA and SQH pending repeat imaging   - Rehab efforts: The patient has been evaluated by a stroke team provider and the therapy needs have been fully considered based off the presenting complaints and exam findings. The following therapy evaluations are needed: PT evaluate and treat, OT evaluate and treat, SLP evaluate and treat, PM&R evaluate for appropriate placement  - VTE prophylaxis: mechanical, hold chemical in the acute phase

## 2023-09-23 NOTE — TRANSFER OF CARE
Anesthesia Transfer of Care Note    Patient: Rhonda Ahuja    Procedure(s) Performed: * No procedures listed *    Patient location: ICU    Anesthesia Type: general    Transport from OR: Transported from OR on room air with adequate spontaneous ventilation, Transported from OR on 2-3 L/min O2 by NC with adequate spontaneous ventilation, Transported from OR on 6-10 L/min O2 by NC with adequate spontaneous ventilation, Continuous monitoring of ECG in transport and Continuous monitoring of SpO2 in transport    Post pain: Adequate analgesia    Post assessment: no apparent anesthetic complications, tolerated procedure well and no evidence of recall    Last vitals:   Visit Vitals  /65 (BP Location: Right arm, Patient Position: Lying)   Pulse 81   Temp 36.9 °C (98.5 °F) (Oral)   Resp 20   Wt 64 kg (141 lb)   SpO2 98%   BMI 24.20 kg/m²       Post vital signs: stable    Level of consciousness: awake, alert  and oriented    Nausea/Vomiting: no nausea/no vomiting    Complications: none

## 2023-09-23 NOTE — H&P
[facility]  Neurocritical Care  History & Physical    Admit Date: 2023  Service Date: 2023  Length of Stay: 0    Subjective:     Chief Complaint: Acute ischemic left MCA stroke    History of Present Illness: Rhonda Ahuja is a 43 y.o. female with no pertinent past medical history that presents with CVA due to L M1 occlusion s/p thrombectomy. Per chart review, the patient noticed that she had speech difficulty at approximately 0800. LKN was 0430. Her aphasia progressively worsened, and she was brought to the ED by her  for further evaluation. Initiall NIHSS was 1, but progressively worsened to 3. CTA Head & Neck was obtained, which showed L M1 LVO. MRI Brain W/WO Contrast showed early diffusion restriction in the posterior left frontal lobe cortex and subcortical white matter. She was taken to IR for cerebral angiogram with aspiration thrombectomy. TICI 2c reperfusion was obtained. The patient will be admitted to [facility] for close monitoring and a higher level of care.       History reviewed. No pertinent past medical history.  Past Surgical History:   Procedure Laterality Date    BREAST BIOPSY Right     excisional benign    BUNIONECTOMY       SECTION      x 2    TONSILLECTOMY        No current facility-administered medications on file prior to encounter.     Current Outpatient Medications on File Prior to Encounter   Medication Sig Dispense Refill    hydrOXYzine HCL (ATARAX) 25 MG tablet Take 1 tablet (25 mg total) by mouth 3 (three) times daily as needed for Anxiety. 15 tablet 0      Allergies: Patient has no known allergies.    Family History   Problem Relation Age of Onset    Hypertension Mother     Diabetes Mother     Coronary artery disease Father     No Known Problems Sister     No Known Problems Brother     Breast cancer Maternal Grandmother     Colon cancer Neg Hx     Ovarian cancer Neg Hx      Social History     Tobacco Use    Smoking status:  Former    Smokeless tobacco: Never   Substance Use Topics    Alcohol use: Not Currently    Drug use: Never     Review of Systems   Constitutional:  Negative for chills, fatigue and fever.   HENT:  Negative for trouble swallowing.    Eyes:  Negative for photophobia and visual disturbance.   Respiratory:  Negative for shortness of breath.    Cardiovascular:  Negative for chest pain.   Gastrointestinal:  Negative for nausea and vomiting.   Musculoskeletal:  Negative for neck pain.   Neurological:  Positive for speech difficulty and headaches. Negative for facial asymmetry and weakness.   Psychiatric/Behavioral:  Negative for confusion.      Objective:     Vitals:    Temp: 98.5 °F (36.9 °C)  Pulse: 88  Rhythm: normal sinus rhythm  BP: 117/63  MAP (mmHg): 84  Resp: (!) 21  SpO2: 100 %    Temp  Min: 97.7 °F (36.5 °C)  Max: 98.5 °F (36.9 °C)  Pulse  Min: 74  Max: 94  BP  Min: 98/56  Max: 140/75  MAP (mmHg)  Min: 68  Max: 100  Resp  Min: 11  Max: 33  SpO2  Min: 97 %  Max: 100 %    No intake/output data recorded.            Physical Exam  Vitals and nursing note reviewed.   Constitutional:       General: She is not in acute distress.     Appearance: Normal appearance.      Comments: Well developed. Well nourished. Resting comfortably in bed.   HENT:      Head: Normocephalic and atraumatic.      Right Ear: External ear normal.      Left Ear: External ear normal.      Nose: Nose normal.      Mouth/Throat:      Mouth: Mucous membranes are moist.      Pharynx: Oropharynx is clear.   Eyes:      Extraocular Movements: Extraocular movements intact.      Pupils: Pupils are equal, round, and reactive to light.   Cardiovascular:      Rate and Rhythm: Normal rate and regular rhythm.   Pulmonary:      Effort: Pulmonary effort is normal. No respiratory distress.   Abdominal:      General: Abdomen is flat. There is no distension.   Musculoskeletal:      Right lower leg: No edema.      Left lower leg: No edema.   Skin:     General: Skin  "is warm and dry.   Neurological:      Mental Status: She is alert.      Comments: E4V5M6  Awake, alert, & oriented x 4. Mild expressive aphasia. No dysarthria. Follows commands briskly.   Visual fields full to confrontation bilaterally. PERRLA. EOMI. SILT in V1, V2, V3. No facial asymmetry. Conversational hearing intact. Swallows without difficulty. Voice is not hoarse. Shoulder shrug intact. Tongue protrudes midline with no deviations or fasciculations.   ISAURO & AG. Strength 5/5 & SILT in all 4 extremities. No pronator drift. Tone normal.         Gait exam deferred.        NIH Stroke Scale   1a. Level of Consciousness  0   1b. LOC Questions  0   1c. LOC Commands  0   2. Best Gaze  0   3. Visual  0   4. Facial Palsy  0   5a. Motor Arm, Left  0    5b. Motor Arm, Right  0   6a. Motor Leg, Left  0   6b. Motor Leg, Right  0   7. Limb Ataxia  0   8. Sensory  0   9. Best Language  1   10. Dysarthria  0   11. Extinction and Inattention (formerly Neglect)  0   Total (NIH Stroke Scale)  1         Today I personally reviewed pertinent medications, lines/drains/airways, imaging, laboratory results, notably:    Laboratory:  CBC:  Recent Labs   Lab 09/23/23  0950   WBC 6.66   RBC 4.29   HGB 13.5   HCT 39.3      MCV 92   MCH 31.5*   MCHC 34.4       CMP:  Recent Labs   Lab 09/23/23  0950   CALCIUM 9.8   ALBUMIN 4.4   PROT 7.9      K 4.1   CO2 24      BUN 20   CREATININE 0.8   ALKPHOS 54*   ALT 18   AST 19   BILITOT 0.4     No results for input(s): "MG", "PHOS" in the last 168 hours.    Coagulation:  Recent Labs   Lab 09/23/23  0950 09/23/23  0957   LABPROT 10.0  --    INR 0.9 1.1       Lipid Panel:  Recent Labs   Lab 09/23/23  0950   CHOL 277*   HDL 55   LDLCALC 200.0*   TRIG 110       Endocrine:  Recent Labs     09/23/23  0950   TSH 1.666         Urinalysis:  Recent Labs   Lab 09/23/23  1007   COLORU Colorless*   SPECGRAV >1.030*   PHUR 6.0   OCCULTUA Negative   GLUCUA Negative   KETONESU Negative   PROTEINUA " Trace*   BILIRUBINUA Negative       Imaging:  CTA Stroke Multiphase  Impression:  1. Abrupt truncation of the proximal superior division M2.  There is intermittent thready reconstitution distally, with multifocal additional short segment high-grade stenoses/focal occlusions of M2 branches within the sylvian fissure.  2. No evidence of acute intracranial hemorrhage, mass effect, midline shift.  No definite early large territory infarct signs by CT.  3. Additional details as above.  Preliminary results regarding Impression #1 reviewed via Secure Chat with Dr. Thrasher at approximately 10:00, 09/23/2023.   Electronically signed by: Royal Pacheco  Date:                                            09/23/2023  Time:                                           10:10       MRI Brain Without Contrast:  Impression:  1. Findings in keeping with recent infarction involving the left frontal cortex and subcortical white matter as well as involving the left centrum semiovale and corona radiata.  No associated macroscopic hemorrhage or mass effect at the present time.  2. Blooming of susceptibility related signal loss corresponding to area of thrombus within the left MCA as seen on earlier same day CTA.  Additionally, there is serpiginous FLAIR hyperintense signal within the sulci about the left cerebral hemisphere, in keeping with slow and/or disorganized flow related to proximal stenosis/occlusion.  This report was flagged in Epic as abnormal.  Electronically signed by: Royal Pacheco  Date:                                            09/23/2023  Time:                                           10:59    Assessment/Plan:     Neuro  * Acute ischemic left MCA stroke  43 y.o. female presents with left MCA CVA s/p thrombectomy. No TNK. TICI 2c. Initial NIH 3. NIH on admission 1.   - Admit to [facility]  - Vascular Neurology consulted   - Initial CTA shows L M1 LVO  - MRI Brain W/O Contrast obtained in ED showing findings in keeping with recent  infarction involving the left frontal cortex and subcortical white matter as well as involving the left centrum semiovale and corona radiata.     - Post-IR CT ordered and pending [id number] as there was some intra-procedural contrast extravasation  - q1 neuro checks, vitals, I/Os  - Utox, UA  - EKG, Echo, and CXR pending  - A1c, TSH, and lipid panel pending  - aPTT, PT/INR pending  - CBC, CMP, Mag, and phos daily  - SBP goal < 140  - PRN labetalol and hydralazine  - Statin   - Repeat CT ordered for tomorrow at 0800 for follow up   - Will plan to start ASA and SQH pending repeat imaging   - Rehab efforts: The patient has been evaluated by a stroke team provider and the therapy needs have been fully considered based off the presenting complaints and exam findings. The following therapy evaluations are needed: PT evaluate and treat, OT evaluate and treat, SLP evaluate and treat, PM&R evaluate for appropriate placement  - VTE prophylaxis: mechanical, hold chemical in the acute phase      Aphasia  SLP           The patient is being Prophylaxed for:  Venous Thromboembolism with: Mechanical  Stress Ulcer with: Not Applicable   Ventilator Pneumonia with: not applicable    Activity Orders          Turn patient starting at 09/23 1400    Elevate HOB starting at 09/23 1229    Diet NPO: NPO starting at 09/23 1229        Full Code    Critical condition in that Patient has a condition that poses threat to life and bodily function: see problem list above     37 minutes of Critical care time was spent personally by me on the following activities: development of treatment plan with patient or surrogate and bedside caregivers, discussions with consultants, evaluation of patient's response to treatment, examination of patient, ordering and performing treatments and interventions, ordering and review of laboratory studies, ordering and review of radiographic studies, pulse oximetry, re-evaluation of patient's condition. This critical care  time did not overlap with that of any other provider or involve time for any procedures. There is high probability for acute neurological change leading to clinical and possibly life-threatening deterioration requiring highest level of physician preparedness for urgent intervention.     Ita Mckenzie PA-C  Neurocritical Care  [facility] Neuro Critical Care

## 2023-09-23 NOTE — ED PROVIDER NOTES
Encounter Date: 2023     Source of History:   Patient, patient's , and medical record, without language barrier or      Chief complaint:  Aphasia (Started at 8 AM, +facial droop, no unilateral weakness )    HPI:  Rhonda Ahuja is a 43 y.o. female with no significant medical history presenting with chief complaint of aphasia.  Patient 1st noticed she had trouble speaking at around 8:00 a.m. this morning.  Patient then woke up her  and presented to [facility] ED. patient works as a  and a club and was at work until 4:00 a.m. this morning.  The patient's  states he picked her up and that her last known normal was around 430 this morning.  She denies drinking or taking any drugs last night.    This is the extent to the patients complaints today here in the emergency department.    ROS: As per HPI and below:  ROS    Review of patient's allergies indicates:  No Known Allergies  PMH:  As per HPI and below:  History reviewed. No pertinent past medical history.  Past Surgical History:   Procedure Laterality Date    BREAST BIOPSY Right     excisional benign    BUNIONECTOMY       SECTION      x 2    TONSILLECTOMY       Social History     Tobacco Use    Smoking status: Former    Smokeless tobacco: Never   Substance Use Topics    Alcohol use: Not Currently    Drug use: Never     Vitals:    /60   Pulse 78   Temp 97.7 °F (36.5 °C) (Oral)   Resp (!) 25   Wt 64 kg (141 lb)   SpO2 99%   BMI 24.20 kg/m²     Physical Exam  Vitals and nursing note reviewed.   Constitutional:       General: She is not in acute distress.     Appearance: Normal appearance. She is not toxic-appearing or diaphoretic.   HENT:      Head: Normocephalic and atraumatic.      Right Ear: External ear normal.      Left Ear: External ear normal.   Eyes:      General: No scleral icterus.     Conjunctiva/sclera: Conjunctivae normal.   Cardiovascular:      Rate and Rhythm: Normal rate and regular rhythm.    Pulmonary:      Effort: Pulmonary effort is normal. No respiratory distress.      Breath sounds: No stridor.   Abdominal:      General: Abdomen is flat. There is no distension.   Musculoskeletal:         General: No swelling.      Cervical back: Normal range of motion and neck supple.      Right lower leg: No edema.   Skin:     General: Skin is dry.      Coloration: Skin is not jaundiced.   Neurological:      Mental Status: She is alert and oriented to person, place, and time. Mental status is at baseline.      Comments:   -Moves all extremities   -Expressive aphasia  -II: PERRL; III/IV/VI: EOMI w/out evidence of nystagmus or pain;  -V: no deficits appreciated to light touch bilateral face;   -VII:  Right-sided facial droop Eyebrow raise symmetric.   -IX/X: palate midline, and raises symmetrically; XI: shoulder shrug 5/5 bilaterally; XII: tongue is midline w/out asymmetry.   -Strength 5/5 to right upper and lower extremities,  -Strength 5/5 to left upper and lower extremities,  -Sensation intact to light touch to bilateral upper and lower extremities  -F2N WNL.   -Normal pronator drift    Psychiatric:         Mood and Affect: Mood normal.         Behavior: Behavior normal.       Procedures    Laboratory Studies:  Labs that have been ordered have been independently reviewed and interpreted by myself.  Labs Reviewed   CBC W/ AUTO DIFFERENTIAL - Abnormal; Notable for the following components:       Result Value    MCH 31.5 (*)     All other components within normal limits   COMPREHENSIVE METABOLIC PANEL - Abnormal; Notable for the following components:    Glucose 113 (*)     Alkaline Phosphatase 54 (*)     Anion Gap 7 (*)     All other components within normal limits   LIPID PANEL - Abnormal; Notable for the following components:    Cholesterol 277 (*)     LDL Cholesterol 200.0 (*)     HDL/Cholesterol Ratio 19.9 (*)     All other components within normal limits   URINALYSIS, REFLEX TO URINE CULTURE - Abnormal;  Notable for the following components:    Color, UA Colorless (*)     Specific Gravity, UA >1.030 (*)     Protein, UA Trace (*)     All other components within normal limits    Narrative:     Specimen Source->Urine   ACETAMINOPHEN LEVEL - Abnormal; Notable for the following components:    Acetaminophen (Tylenol), Serum <3.0 (*)     All other components within normal limits   POCT GLUCOSE - Abnormal; Notable for the following components:    POCT Glucose 119 (*)     All other components within normal limits   PROTIME-INR   TSH   FENTANYL, URINE    Narrative:     Specimen Source->Urine   DRUG SCREEN PANEL, URINE EMERGENCY    Narrative:     Specimen Source->Urine   ALCOHOL,MEDICAL (ETHANOL)   HIV 1 / 2 ANTIBODY   HEPATITIS C ANTIBODY   POCT URINE PREGNANCY   POCT GLUCOSE, HAND-HELD DEVICE   ISTAT PROCEDURE   ISTAT CREATININE     Imaging Results              IR Angiogram Cerebral Intracranial ea Add vessel (In process)                       MRI Brain Without Contrast (Final result)  Result time 09/23/23 10:59:08      Final result by Royal Pacheco MD (09/23/23 10:59:08)                   Impression:      1. Findings in keeping with recent infarction involving the left frontal cortex and subcortical white matter as well as involving the left centrum semiovale and corona radiata.  No associated macroscopic hemorrhage or mass effect at the present time.  2. Blooming of susceptibility related signal loss corresponding to area of thrombus within the left MCA as seen on earlier same day CTA.  Additionally, there is serpiginous FLAIR hyperintense signal within the sulci about the left cerebral hemisphere, in keeping with slow and/or disorganized flow related to proximal stenosis/occlusion.  This report was flagged in Epic as abnormal.      Electronically signed by: Royal Pacheco  Date:    09/23/2023  Time:    10:59               Narrative:    EXAMINATION:  MRI BRAIN WITHOUT CONTRAST    CLINICAL HISTORY:  Stroke, follow  up;    TECHNIQUE:  Multiplanar multisequence MR imaging of the brain was performed without contrast.    COMPARISON:  CTA head and neck performed 09/23/2023.    FINDINGS:  Parenchyma: Restricted diffusion is noted involving the posterior left frontal lobe cortex and subcortical white matter, including the precentral gyrus, as well as the lateral left basal ganglia and left corona radiata and centrum semiovale in keeping with recent ischemia.    No definite additional areas of ischemia and other vascular territories.  No acute intracranial hemorrhage, mass effect, or midline shift at the present time.    Additional comments: There is no midline shift, abnormal extra-axial fluid collection, or acute intracranial hemorrhage. The basal cisterns are patent.    Ventricles: Normal.    Flow voids: There is blooming of susceptibility related signal loss corresponding to area of thrombus within the left MCA as seen on earlier same day CTA.  Additionally, there is serpiginous FLAIR hyperintense signal within the sulci about the left cerebral hemisphere, in keeping with slow and/or disorganized flow related to proximal stenosis/occlusion.    Major intracranial vascular flow voids are otherwise maintained.    Sinuses and mastoid air cells: Scattered relatively modest paranasal sinus mucosal thickening.    Orbits: Normal    Midline structures: The pituitary and craniocervical junction are normal.    Marrow: Normal                                         CTA STROKE MULTI-PHASE (Final result)  Result time 09/23/23 10:10:49      Final result by Royal Pacheco MD (09/23/23 10:10:49)                   Impression:      1. Abrupt truncation of the proximal superior division M2.  There is intermittent thready reconstitution distally, with multifocal additional short segment high-grade stenoses/focal occlusions of M2 branches within the sylvian fissure.  2. No evidence of acute intracranial hemorrhage, mass effect, midline shift.  No  definite early large territory infarct signs by CT.  3. Additional details as above.  Preliminary results regarding Impression #1 reviewed via Secure Chat with Dr. Thrasher at approximately 10:00, 09/23/2023.      Electronically signed by: Royal Pacheco  Date:    09/23/2023  Time:    10:10               Narrative:    EXAMINATION:  CTA STROKE MULTI-PHASE    CLINICAL HISTORY:  Neuro deficit, acute, stroke suspected;    TECHNIQUE:  Non contrast low dose axial images were obtained thought the head. CT angiogram was performed from the level of the valentina to the top of the head following the IV administration of 100mL of Omnipaque 350.   Sagittal and coronal reconstructions and maximum intensity projection reconstructions were performed. Arterial stenosis percentages are based on NASCET measurement criteria.  Two additional phases of immediate post-contrast CTA images were performed through the head alone.    COMPARISON:  None    FINDINGS:  CT HEAD:    Blood: No acute intracranial hemorrhage.    Parenchyma: No definite loss of gray-white differentiation to suggest acute or subacute transcortical infarct.    Ventricles/Extra-axial spaces: No abnormal extra-axial fluid collection. Basal cisterns are patent.    Vessels: Grossly unremarkable by unenhanced technique.    Orbits: Unremarkable.    Scalp: Unremarkable.    Skull: There are no depressed skull fractures or destructive bone lesions.    Sinuses and mastoids: Scattered relatively modest paranasal sinus mucosal thickening.    Other findings: None    CTA:    NECK:    Imaged aortic arch: Nonaneurysmal.    Right common and cervical internal carotid arteries: No flow-limiting stenosis or dissection.    Left common and cervical internal carotid arteries: No flow-limiting stenosis or dissection.    Right cervical vertebral artery: There is no hemodynamically significant stenosis or dissection    Left cervical vertebral artery: There is no hemodynamically significant stenosis  or dissection.    HEAD:    Right anterior circulation:Normal Right ophthalmic artery is patent.    Left anterior circulation: ICA appears patent.  There is abrupt truncation of the proximal superior division M2 (axial source series 5, image 475; sagittal reformat series 605, image 160).  There is intermittent thready reconstitution distally, with multifocal additional short segment high-grade stenoses/focal occlusions of M2 branches within the sylvian fissure (for example as seen on axial source series 5, image 488-489).Left ophthalmic artery is patent.No flow-limiting stenosis of JAMES branches.    Posterior circulation: There is no hemodynamically significant vertebrobasilar stenosis. There is no significant PCA stenosis. Posterior inferior cerebellar arteries patent at origin.    Anterior and posterior communicating arteries: Penetration of the anterior communicating artery.  Small posterior communicating arteries are suggested.    NON-ANGIOGRAPHIC FINDINGS:    Visualized intracranial contents: As above.    Soft tissues of the neck: Heterogeneous thyroid gland with several nodules.  Reference 16 mm hypodense nodule in the left lobe (series 5, image 213).    Visualized sinuses: As above.    Dentition: Unremarkable.    Spine: Unremarkable.    Visualized lungs: Clear.                                    EKG (independently interpreted by me): Rhythm: nsr, rate of  85 BPM, no ST elevations or depressions, QTc 423    I decided to obtain the patient's medical records.  Summary of Medical Records:      Medications   ondansetron injection 8 mg (8 mg Intravenous Given 9/23/23 1210)   promethazine (PHENERGAN) 25 mg in dextrose 5 % (D5W) 50 mL IVPB (25 mg Intravenous New Bag 9/23/23 1256)   sodium chloride 0.9% flush 10 mL (has no administration in time range)   atorvastatin tablet 40 mg (has no administration in time range)   labetalol 20 mg/4 mL (5 mg/mL) IV syring (has no administration in time range)   hydrALAZINE injection  10 mg (has no administration in time range)   senna-docusate 8.6-50 mg per tablet 1 tablet (has no administration in time range)   acetaminophen tablet 650 mg (has no administration in time range)   magnesium sulfate 2g in water 50mL IVPB (premix) (0 g Intravenous Stopped 9/23/23 1505)   iohexoL (OMNIPAQUE 350) injection 100 mL (100 mLs Intravenous Given 9/23/23 0937)   sodium chloride 0.9% bolus 1,000 mL 1,000 mL (1,000 mLs Intravenous New Bag 9/23/23 1022)   iodixanoL (VISIPAQUE 320) injection 100 mL (100 mLs Intra-arterial Given 9/23/23 1139)   metoclopramide HCl injection 10 mg (10 mg Intravenous Given 9/23/23 1259)   sodium chloride 0.9% bolus 500 mL 500 mL (500 mLs Intravenous New Bag 9/23/23 1303)     MDM:    43 y.o. female with expressive aphasia and facial droop    Differential Dx:  Differential includes but is not limited to CVA, TIA, tox    ED Management:  Stroke workup started for an acute presentation of an emergent condition with multiple comorbidities.  Vascular neurology team emergently consulted.  Ca multiphase demonstrating left-sided M2 occlusion, vascular neurology made aware.  MRI ordered.  MRI also demonstrating M2 occlusion.  Patient taken to thrombectomy with neuro IR and will be admitted to the neuro ICU post thrombectomy for intensive monitoring.    Discussed with:  Vascular neurology    Medical Decision Making  Problems Addressed:  Stroke: acute illness or injury that poses a threat to life or bodily functions    Amount and/or Complexity of Data Reviewed  Labs: ordered. Decision-making details documented in ED Course.  Radiology: ordered.  ECG/medicine tests: ordered and independent interpretation performed. Decision-making details documented in ED Course.    Risk  Prescription drug management.  Decision regarding hospitalization.         ED Course as of 09/23/23 1422   Sat Sep 23, 2023   1016 Notified by radiology at 10:01 a.m. that patient has a right M2 occlusion, vascular neurology  made aware state they will update on POC. On reevaluation at 10:16 AM patient noted to have right sided facial droop. VN notified.  Will hold off on MRI while waiting plan of care by vascular neurology [AW]      ED Course User Index  [AW] Agapito Thrasher MD     Diagnostic Impression:    Final diagnoses:  [I63.9] Stroke     ED Disposition Condition    Admit Stable                 Attending Attestation:   Physician Attestation Statement for Resident:  As the supervising MD   Physician Attestation Statement: I have personally seen and examined this patient.   I agree with the above history.  -:   As the supervising MD I agree with the above PE.     As the supervising MD I agree with the above treatment, course, plan, and disposition.            Attending Critical Care:   Critical Care Times:   Direct Patient Care (initial evaluation, reassessments, and time considering the case)................................................................24 minutes.   Additional History from reviewing old medical records or taking additional history from the family, EMS, PCP, etc.......................3 minutes.   Ordering, Reviewing, and Interpreting Diagnostic Studies...............................................................................................................3 minutes.   Documentation..................................................................................................................................................................................3 minutes.   Consultation with other Physicians. .................................................................................................................................................3 minutes.   ==============================================================  Total Critical Care Time - exclusive of procedural time: 36 minutes.  ==============================================================           Agapito Thrasher MD  Resident  09/23/23  1429       Aristeo Lee MD  09/25/23 [id number]

## 2023-09-23 NOTE — CONSULTS
HPI:   43F w/ no contributory PMH who presents to [facility] [place] ED with L M1 LVO. Patient was LKN @ 0430 this AM when she experienced transitory but progressive aphasia. NIHSS 1 on arrival which improved but then worsened to NIHSS 3. CTA w/ L M1 LVO and MRI with early diffusion changes. Patient taken for emergency IR thrombectomy.      ROS:   Unable to provide 2/2 to aphasia      Objective:  E4V4M5, AOx1, mixed aphasia;  CNi, PERRL;  FCx4, SILT    General: Moderate aphasia  Head: Non-traumatic, normocephalic  Eyes: Pupils equal  Neck: Supple  CVS: Normal rate and rhythm, distal pulses present  Pulm: Symmetric expansion, no respiratory distress  GI: Abdomen nondistended  MSK: Non-traumatic  Skin: Dry, intact  Psych: Moderate aphasia      A&P:  43F w/ no contributory PMH who presents to [facility] [place] ED with L M1 LVO; Neuro-IR consulted for emergent thrombectomy:    --Patient evaluated at bedside by Neuro-IR  --All labs and diagnostics reviewed  --CTA w/ LVO of the left M1 artery  --Patient to undergo emergent IR angio for possible intervention; Anesthesia consulted for sedation          -ASA 2/Mallampati 2  --Further reccomendations to follow procedure    Dispo: Per stroke/NCC teams

## 2023-09-23 NOTE — SUBJECTIVE & OBJECTIVE
No past medical history on file.  Past Surgical History:   Procedure Laterality Date    BREAST BIOPSY Right     excisional benign    BUNIONECTOMY       SECTION      x 2    TONSILLECTOMY       Family History   Problem Relation Age of Onset    Hypertension Mother     Diabetes Mother     Coronary artery disease Father     No Known Problems Sister     No Known Problems Brother     Breast cancer Maternal Grandmother     Colon cancer Neg Hx     Ovarian cancer Neg Hx      Social History     Tobacco Use    Smoking status: Former    Smokeless tobacco: Never   Substance Use Topics    Alcohol use: Not Currently    Drug use: Never     Review of patient's allergies indicates:  No Known Allergies    Medications: I have reviewed the current medication administration record.    (Not in a hospital admission)      Review of Systems   Constitutional:  Negative for chills and fever.   HENT:  Positive for trouble swallowing. Negative for congestion and sore throat.    Eyes:  Negative for photophobia and visual disturbance.   Respiratory:  Negative for cough and shortness of breath.    Cardiovascular:  Negative for chest pain and palpitations.   Gastrointestinal:  Negative for abdominal pain and diarrhea.   Genitourinary:  Negative for dysuria and hematuria.   Musculoskeletal:  Negative for arthralgias and gait problem.   Skin:  Negative for rash and wound.   Neurological:  Positive for speech difficulty. Negative for syncope and headaches.   Psychiatric/Behavioral:  Positive for confusion. Negative for agitation and behavioral problems.      Objective:     Vital Signs (Most Recent):  Temp: 98.5 °F (36.9 °C) (23)  Pulse: 79 (23)  Resp: 16 (23)  BP: (!) 117/57 (23)  SpO2: 97 % (23)    Vital Signs Range (Last 24H):  Temp:  [98.5 °F (36.9 °C)]   Pulse:  [79]   Resp:  [16]   BP: (117)/(57)   SpO2:  [97 %]        Physical Exam  Vitals reviewed.   Constitutional:       General:  She is not in acute distress.     Appearance: Normal appearance.   HENT:      Head: Normocephalic and atraumatic.      Nose: No congestion or rhinorrhea.      Mouth/Throat:      Mouth: Mucous membranes are moist.      Pharynx: Oropharynx is clear.   Eyes:      Extraocular Movements: Extraocular movements intact.      Pupils: Pupils are equal, round, and reactive to light.   Cardiovascular:      Rate and Rhythm: Normal rate and regular rhythm.      Pulses: Normal pulses.      Heart sounds: Normal heart sounds.   Pulmonary:      Effort: Pulmonary effort is normal. No respiratory distress.      Breath sounds: Normal breath sounds.   Abdominal:      General: Bowel sounds are normal.      Palpations: Abdomen is soft.      Tenderness: There is no abdominal tenderness.   Musculoskeletal:         General: No swelling or tenderness.   Skin:     Findings: No bruising or rash.   Neurological:      Mental Status: She is alert and oriented to person, place, and time.      Sensory: No sensory deficit.      Comments: Intact Strength and sensation   Psychiatric:         Mood and Affect: Mood normal.         Behavior: Behavior normal.              Neurological Exam:   LOC: alert  Attention Span: Good   Language: Expressive aphasia  Articulation: No dysarthria  Orientation: Person, Place, Time   Visual Fields: Full  Motor: Arm left  Normal 5/5  Leg left  Normal 5/5  Arm right  Normal 5/5  Leg right Normal 5/5  Sensation: Intact to light touch, temperature and vibration  Some facial paralysis on Right side    Laboratory:  CMP:   Recent Labs   Lab 09/23/23  0950   CALCIUM 9.8   ALBUMIN 4.4   PROT 7.9      K 4.1   CO2 24      BUN 20   CREATININE 0.8   ALKPHOS 54*   ALT 18   AST 19   BILITOT 0.4     CBC:   Recent Labs   Lab 09/23/23  0950   WBC 6.66   RBC 4.29   HGB 13.5   HCT 39.3      MCV 92   MCH 31.5*   MCHC 34.4       Diagnostic Results:      Brain imaging:  FINDINGS:  CT HEAD:     Blood: No acute intracranial  hemorrhage.     Parenchyma: No definite loss of gray-white differentiation to suggest acute or subacute transcortical infarct.     Ventricles/Extra-axial spaces: No abnormal extra-axial fluid collection. Basal cisterns are patent.     Vessels: Grossly unremarkable by unenhanced technique.     Orbits: Unremarkable.     Scalp: Unremarkable.     Skull: There are no depressed skull fractures or destructive bone lesions.     Sinuses and mastoids: Scattered relatively modest paranasal sinus mucosal thickening.     Other findings: None     CTA:     NECK:     Imaged aortic arch: Nonaneurysmal.     Right common and cervical internal carotid arteries: No flow-limiting stenosis or dissection.     Left common and cervical internal carotid arteries: No flow-limiting stenosis or dissection.     Right cervical vertebral artery: There is no hemodynamically significant stenosis or dissection     Left cervical vertebral artery: There is no hemodynamically significant stenosis or dissection.     HEAD:     Right anterior circulation:Normal Right ophthalmic artery is patent.     Left anterior circulation: ICA appears patent.  There is abrupt truncation of the proximal superior division M2 (axial source series 5, image 475; sagittal reformat series 605, image 160).  There is intermittent thready reconstitution distally, with multifocal additional short segment high-grade stenoses/focal occlusions of M2 branches within the sylvian fissure (for example as seen on axial source series 5, image 488-489).Left ophthalmic artery is patent.No flow-limiting stenosis of JAMES branches.     Posterior circulation: There is no hemodynamically significant vertebrobasilar stenosis. There is no significant PCA stenosis. Posterior inferior cerebellar arteries patent at origin.     Anterior and posterior communicating arteries: Penetration of the anterior communicating artery.  Small posterior communicating arteries are suggested.     NON-ANGIOGRAPHIC  FINDINGS:     Visualized intracranial contents: As above.     Soft tissues of the neck: Heterogeneous thyroid gland with several nodules.  Reference 16 mm hypodense nodule in the left lobe (series 5, image 213).     Visualized sinuses: As above.     Dentition: Unremarkable.     Spine: Unremarkable.     Visualized lungs: Clear.     Impression:     1. Abrupt truncation of the proximal superior division M2.  There is intermittent thready reconstitution distally, with multifocal additional short segment high-grade stenoses/focal occlusions of M2 branches within the sylvian fissure.  2. No evidence of acute intracranial hemorrhage, mass effect, midline shift.  No definite early large territory infarct signs by CT.  3. Additional details as above.

## 2023-09-23 NOTE — ED NOTES
MD Lee at bedside. Pt updated on POC. Instructed by MD Lee to await stroke team before going to MRI.
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
## 2023-09-23 NOTE — ASSESSMENT & PLAN NOTE
43F with no significant medical history presenting with acute onset of aphasia, confusion, and right facial droop with difficulty swallowing. Last known normal was 9/23/23 4:00AM. Woke up at 8AM with these symptoms. Some improvement in aphasia on presentation, however pt remained mildly confused. Initial NIH scale 0, but pt developed R facial droop with difficulty swallowing, NIH scale 2 on subsequent evaluation with RUE drift as well. Taken for MRI, pending results.    Antithrombotics for secondary stroke prevention: Antiplatelets: Aspirin: 325 mg daily  Clopidogrel: 300 mg loading dose x 1, now    Statins for secondary stroke prevention and hyperlipidemia, if present:   Statins: Atorvastatin- 40 mg daily    Aggressive risk factor modification: None     Rehab efforts: The patient has been evaluated by a stroke team provider and the therapy needs have been fully considered based off the presenting complaints and exam findings. The following therapy evaluations are needed: PT evaluate and treat, OT evaluate and treat, SLP evaluate and treat    Diagnostics ordered/pending: HgbA1C to assess blood glucose levels, Lipid Profile to assess cholesterol levels, MRI head without contrast to assess brain parenchyma    VTE prophylaxis: Heparin 5000 units SQ every 8 hours    BP parameters: Pending possible intervention.

## 2023-09-23 NOTE — BRIEF OP NOTE
Procedural Date:  09/23/23    Attending:  Dallas Mcclure MD    Fellow(s):  Godwin Soriano MD    Preoperative Diagnoses:   L M1 LVO     Postoperative Diagnosis:  Same s/p mechanical thrombectomy; TICI 2C     Procedure:   1-vessel cerebral angiogram with mechanical thrombectomy for L M1 LVO     Written Informed Consent Obtained:   Yes; Obtained from      Specimen Removed:   None     Estimated Blood Loss:  Minimal     Brief Procedure report:   A 8 Burmese sheath was placed into the right common femoral artery and a 0.88 Zoom guide catheter with 5 Burmese Cholo diagnostic catheter was advanced into the aortic arch.  The L common carotid & L internal carotid arteries were subselected and angiography of the brain was performed. A left M1 LVO was appreciated and Zoom 71 & 35 catheters were advanced to the clot interface for direct aspiration thrombectomy with TICI 2C revascularization. Small post-thrombectomy extravasation seen at site of distal occlusion at L M3 branch and so further attempts at distal thrombectomy were aborted. Of note, a small ledge at the proximal ICA was noted concerning for possible web. There is no other evidence of aneurysm, fistula, malformation, or significant stenoocclusive disease.  A right common femoral artery angiogram was performed, the sheath removed and hemostasis achieved via manual pressure.  No hematoma was present at the time of hemostasis.   The patient tolerated the procedure well.     Preliminary Interpretation:   1.    Left M1 direct aspiration thrombectomy with TICI 2C revascularization  2.    Small ledge at the proximal ICA was noted concerning for possible web  3.    Otherwise normal cerebral angiogram.     (Please see Imaging report for full details)    Disposition:  [facility]

## 2023-09-23 NOTE — SUBJECTIVE & OBJECTIVE
History reviewed. No pertinent past medical history.  Past Surgical History:   Procedure Laterality Date    BREAST BIOPSY Right     excisional benign    BUNIONECTOMY       SECTION      x 2    TONSILLECTOMY        No current facility-administered medications on file prior to encounter.     Current Outpatient Medications on File Prior to Encounter   Medication Sig Dispense Refill    hydrOXYzine HCL (ATARAX) 25 MG tablet Take 1 tablet (25 mg total) by mouth 3 (three) times daily as needed for Anxiety. 15 tablet 0      Allergies: Patient has no known allergies.    Family History   Problem Relation Age of Onset    Hypertension Mother     Diabetes Mother     Coronary artery disease Father     No Known Problems Sister     No Known Problems Brother     Breast cancer Maternal Grandmother     Colon cancer Neg Hx     Ovarian cancer Neg Hx      Social History     Tobacco Use    Smoking status: Former    Smokeless tobacco: Never   Substance Use Topics    Alcohol use: Not Currently    Drug use: Never     Review of Systems   Constitutional:  Negative for chills, fatigue and fever.   HENT:  Negative for trouble swallowing.    Eyes:  Negative for photophobia and visual disturbance.   Respiratory:  Negative for shortness of breath.    Cardiovascular:  Negative for chest pain.   Gastrointestinal:  Negative for nausea and vomiting.   Musculoskeletal:  Negative for neck pain.   Neurological:  Positive for speech difficulty and headaches. Negative for facial asymmetry and weakness.   Psychiatric/Behavioral:  Negative for confusion.      Objective:     Vitals:    Temp: 98.5 °F (36.9 °C)  Pulse: 88  Rhythm: normal sinus rhythm  BP: 117/63  MAP (mmHg): 84  Resp: (!) 21  SpO2: 100 %    Temp  Min: 97.7 °F (36.5 °C)  Max: 98.5 °F (36.9 °C)  Pulse  Min: 74  Max: 94  BP  Min: 98/56  Max: 140/75  MAP (mmHg)  Min: 68  Max: 100  Resp  Min: 11  Max: 33  SpO2  Min: 97 %  Max: 100 %    No intake/output data recorded.            Physical  Exam  Vitals and nursing note reviewed.   Constitutional:       General: She is not in acute distress.     Appearance: Normal appearance.      Comments: Well developed. Well nourished. Resting comfortably in bed.   HENT:      Head: Normocephalic and atraumatic.      Right Ear: External ear normal.      Left Ear: External ear normal.      Nose: Nose normal.      Mouth/Throat:      Mouth: Mucous membranes are moist.      Pharynx: Oropharynx is clear.   Eyes:      Extraocular Movements: Extraocular movements intact.      Pupils: Pupils are equal, round, and reactive to light.   Cardiovascular:      Rate and Rhythm: Normal rate and regular rhythm.   Pulmonary:      Effort: Pulmonary effort is normal. No respiratory distress.   Abdominal:      General: Abdomen is flat. There is no distension.   Musculoskeletal:      Right lower leg: No edema.      Left lower leg: No edema.   Skin:     General: Skin is warm and dry.   Neurological:      Mental Status: She is alert.      Comments: E4V5M6  Awake, alert, & oriented x 4. Mild expressive aphasia. No dysarthria. Follows commands briskly.   Visual fields full to confrontation bilaterally. PERRLA. EOMI. SILT in V1, V2, V3. No facial asymmetry. Conversational hearing intact. Swallows without difficulty. Voice is not hoarse. Shoulder shrug intact. Tongue protrudes midline with no deviations or fasciculations.   ISAURO & AG. Strength 5/5 & SILT in all 4 extremities. No pronator drift. Tone normal.         Gait exam deferred.        NIH Stroke Scale   1a. Level of Consciousness  0   1b. LOC Questions  0   1c. LOC Commands  0   2. Best Gaze  0   3. Visual  0   4. Facial Palsy  0   5a. Motor Arm, Left  0    5b. Motor Arm, Right  0   6a. Motor Leg, Left  0   6b. Motor Leg, Right  0   7. Limb Ataxia  0   8. Sensory  0   9. Best Language  1   10. Dysarthria  0   11. Extinction and Inattention (formerly Neglect)  0   Total (NIH Stroke Scale)  1         Today I personally reviewed pertinent  "medications, lines/drains/airways, imaging, laboratory results, notably:    Laboratory:  CBC:  Recent Labs   Lab 09/23/23  0950   WBC 6.66   RBC 4.29   HGB 13.5   HCT 39.3      MCV 92   MCH 31.5*   MCHC 34.4       CMP:  Recent Labs   Lab 09/23/23  0950   CALCIUM 9.8   ALBUMIN 4.4   PROT 7.9      K 4.1   CO2 24      BUN 20   CREATININE 0.8   ALKPHOS 54*   ALT 18   AST 19   BILITOT 0.4     No results for input(s): "MG", "PHOS" in the last 168 hours.    Coagulation:  Recent Labs   Lab 09/23/23  0950 09/23/23  0957   LABPROT 10.0  --    INR 0.9 1.1       Lipid Panel:  Recent Labs   Lab 09/23/23  0950   CHOL 277*   HDL 55   LDLCALC 200.0*   TRIG 110       Endocrine:  Recent Labs     09/23/23  0950   TSH 1.666         Urinalysis:  Recent Labs   Lab 09/23/23  1007   COLORU Colorless*   SPECGRAV >1.030*   PHUR 6.0   OCCULTUA Negative   GLUCUA Negative   KETONESU Negative   PROTEINUA Trace*   BILIRUBINUA Negative       Imaging:  CTA Stroke Multiphase  Impression:  1. Abrupt truncation of the proximal superior division M2.  There is intermittent thready reconstitution distally, with multifocal additional short segment high-grade stenoses/focal occlusions of M2 branches within the sylvian fissure.  2. No evidence of acute intracranial hemorrhage, mass effect, midline shift.  No definite early large territory infarct signs by CT.  3. Additional details as above.  Preliminary results regarding Impression #1 reviewed via Secure Chat with Dr. Thrasher at approximately 10:00, 09/23/2023.   Electronically signed by: Royal Pacheco  Date:                                            09/23/2023  Time:                                           10:10       MRI Brain Without Contrast:  Impression:  1. Findings in keeping with recent infarction involving the left frontal cortex and subcortical white matter as well as involving the left centrum semiovale and corona radiata.  No associated macroscopic hemorrhage or mass " effect at the present time.  2. Blooming of susceptibility related signal loss corresponding to area of thrombus within the left MCA as seen on earlier same day CTA.  Additionally, there is serpiginous FLAIR hyperintense signal within the sulci about the left cerebral hemisphere, in keeping with slow and/or disorganized flow related to proximal stenosis/occlusion.  This report was flagged in Epic as abnormal.  Electronically signed by: Royal Pacheco  Date:                                            09/23/2023  Time:                                           10:59

## 2023-09-23 NOTE — ED TRIAGE NOTES
Pt arrives to ED with expressive aphasia that started at 8 AM. Pt denies any numbness or tingling. Pt has slight right sided facial droop.

## 2023-09-23 NOTE — ANESTHESIA PREPROCEDURE EVALUATION
[facility]  Anesthesia Pre-Operative Evaluation         Patient Name: Rhonda Ahuja  YOB: 1980  MRN: [id number]    SUBJECTIVE:   IR ANGIOGRAM CEREBRAL INTRACRANIAL EA ADD VESSEL     2023    Rhonda Ahuja is a 43 y.o. female w/ a significant PMHx of no significant medical history presenting with chief complaint of aphasia.  Patient 1st noticed she had trouble speaking at around 8:00 a.m. this morning.  Patient then woke up her  and presented to [facility] ED. patient works as a  and a club and was at work until 4:00 a.m. this morning.  The patient's  states he picked her up and that her last known normal was around 430 this morning.  She denies drinking or taking any drugs last night    NPO: cubed  Steak at 430, blackberries this morning 8  Access: 20g PIV  HD: stable on room air      LDA:        Peripheral IV - Single Lumen 23 20 G Left Antecubital (Active)   Site Assessment Clean;Dry;Intact 23   Extremity Assessment Distal to IV No abnormal discoloration;No redness;No swelling 23   Dressing Status Clean;Dry;Intact 23   Dressing Intervention Integrity maintained 23   Number of days: 0       Review of patient's allergies indicates:  No Known Allergies    Current Inpatient Medications:    sodium chloride 0.9%  1,000 mL Intravenous ED 1 Time       Current Outpatient Medications   Medication Instructions    hydrOXYzine HCL (ATARAX) 25 mg, Oral, 3 times daily PRN       Surgical History  Past Surgical History:   Procedure Laterality Date    BREAST BIOPSY Right     excisional benign    BUNIONECTOMY       SECTION      x 2    TONSILLECTOMY         Social History:  Tobacco Use: Medium Risk (2022)    Patient History     Smoking Tobacco Use: Former     Smokeless Tobacco Use: Never     Passive Exposure: Not on file     Alcohol Use: Not At Risk (2020)    AUDIT-C     Frequency of  "Alcohol Consumption: Never     Average Number of Drinks: Not on file     Frequency of Binge Drinking: Not on file         OBJECTIVE:     Vital Signs Range (Last 24H):  Temp:  [36.9 °C (98.5 °F)]   Pulse:  [79]   Resp:  [16]   BP: (117)/(57)   SpO2:  [97 %]       Significant Labs:  Lab Results   Component Value Date    WBC 6.66 09/23/2023    HGB 13.5 09/23/2023    HCT 39.3 09/23/2023     09/23/2023    INR 1.1 09/23/2023       No results found for: "NA", "K", "CL", "CREATININE", "BUN", "CO2", "GLUF"    No results found for: "TSH", "HGBA1C"    EKG:   Results for orders placed or performed during the hospital encounter of 09/23/23   ECG 12 lead    Collection Time: 09/23/23  9:49 AM    Narrative    Test Reason : I63.9,    Vent. Rate : 085 BPM     Atrial Rate : 085 BPM     P-R Int : 316 ms          QRS Dur : 076 ms      QT Int : 356 ms       P-R-T Axes : 069 098 075 degrees     QTc Int : 423 ms    Sinus rhythm with 1st degree A-V block  Low voltage QRS  Low anterior forces  Abnormal ECG  When compared with ECG of 25-NOV-2022 16:47,  Questionable change in initial forces of Anterior-septal leads  Confirmed by VICTORINA CALDERÓN MD ([id number]) on 9/23/2023 9:59:29 AM    Referred By: System System           Confirmed By:VICTORINA CALDERÓN MD       ASSESSMENT/PLAN:         Pre-op Assessment    I have reviewed the Patient Summary Reports.     I have reviewed the Nursing Notes. I have reviewed the NPO Status.   I have reviewed the Medications.     Review of Systems      Physical Exam  General: Well nourished, Cooperative, Alert and Oriented    Airway:  Mallampati: II   Neck ROM: Normal ROM    Dental:  Intact        Anesthesia Plan  Type of Anesthesia, risks & benefits discussed:    Anesthesia Type: MAC, Gen Natural Airway  Intra-op Monitoring Plan: Standard ASA Monitors  Post Op Pain Control Plan: multimodal analgesia  Induction:  IV and rapid sequence  ASA Score: 3 Emergent  Day of Surgery Review of History & Physical: H&P " Update referred to the surgeon/provider.    Ready For Surgery From Anesthesia Perspective.     .

## 2023-09-23 NOTE — HPI
Rhonda Ahuja is a 43 y.o. female with no pertinent past medical history that presents with CVA due to L M1 occlusion s/p thrombectomy. Per chart review, the patient noticed that she had speech difficulty at approximately 0800. LKN was 0430. Her aphasia progressively worsened, and she was brought to the ED by her  for further evaluation. Initiall NIHSS was 1, but progressively worsened to 3. CTA Head & Neck was obtained, which showed L M1 LVO. MRI Brain W/WO Contrast showed early diffusion restriction in the posterior left frontal lobe cortex and subcortical white matter. She was taken to IR for cerebral angiogram with aspiration thrombectomy. TICI 2c reperfusion was obtained. The patient will be admitted to [facility] for close monitoring and a higher level of care.

## 2023-09-23 NOTE — ASSESSMENT & PLAN NOTE
43F with no significant medical history presenting with acute onset of aphasia, confusion, and right facial droop with difficulty swallowing. Last known normal was 9/23/23 4:00AM. Woke up at 8AM with these symptoms. Some improvement in aphasia on presentation, however pt remained mildly confused. Initial NIH scale 0, but pt developed R facial droop with difficulty swallowing, NIH scale 2 on subsequent evaluation with RUE drift as well. Taken for MRI, pending results.    - CT Head showing abrupt truncation of the proximal superior division M2. No evidence of acute intracranial hemorrhage, mass effect, midline shift.  No definite early large territory infarct signs by CT.  - Awaiting MRI brain read for further recommendations

## 2023-09-23 NOTE — PLAN OF CARE
Procedure complete. Pt tolerated well. Hemostasis 1145. Flat for 4hours. VSS. Site CDI. Pt transferred to [facility] and report to be given bedside.

## 2023-09-23 NOTE — ANESTHESIA PROCEDURE NOTES
Intubation    Date/Time: 9/23/2023 11:05 AM    Performed by: Henri Davenport CRNA  Authorized by: Desiree Atkinson MD    Intubation:     Induction:  Intravenous    Intubated:  Postinduction    Mask Ventilation:  Not attempted    Attempts:  1    Attempted By:  CRNA    Method of Intubation:  Direct    Blade:  Duarte 2    Laryngeal View Grade: Grade I - full view of cords      Difficult Airway Encountered?: No      Complications:  None    Airway Device:  Oral endotracheal tube    Airway Device Size:  7.5    Style/Cuff Inflation:  Cuffed    Inflation Amount (mL):  6    Tube secured:  21    Secured at:  The lips    Placement Verified By:  Capnometry    Complicating Factors:  None    Findings Post-Intubation:  BS equal bilateral and atraumatic/condition of teeth unchanged

## 2023-09-23 NOTE — PLAN OF CARE
"[facility] Care Plan    POC reviewed with Rhonda Ahuja and family at 1400. . Questions and concerns addressed. No acute events today. Pt progressing toward goals. Will continue to monitor. See below and flowsheets for full assessment and VS info.       -Pt c/o nausea and dry heaving post procedure. Administered zofran w/o improvement. Administered Phenergan IV with minimal improvement.   -Pt also c/o HA. Administered HA cocktail x1 with improvement of Nausea and Headache.   -Pt taken to [facility]. See results. Possible repeat tonight per NSGY request, order not placed.       Is this a stroke patient? yes- Stroke booklet reviewed with patient and family, risk factors identified for patient and stroke booklet remains at bedside for ongoing education.     Neuro:  Gwendolyn Coma Scale  Best Eye Response: 4-->(E4) spontaneous  Best Motor Response: 6-->(M6) obeys commands  Best Verbal Response: 5-->(V5) oriented  [place] Coma Scale Score: 15  Pupil PERRLA: yes     24 hr Temp:  [97.7 °F (36.5 °C)-98.5 °F (36.9 °C)]     CV:   Rhythm: normal sinus rhythm  BP goals:   SBP < 140  MAP > 65    Resp:           Plan: N/A    GI/:        Last Bowel Movement: 09/23/23  Voiding Characteristics: external catheter    Intake/Output Summary (Last 24 hours) at 9/23/2023 1757  Last data filed at 9/23/2023 1600  Gross per 24 hour   Intake 622.33 ml   Output --   Net 622.33 ml          Labs/Accuchecks:  Recent Labs   Lab 09/23/23  0950   WBC 6.66   RBC 4.29   HGB 13.5   HCT 39.3         Recent Labs   Lab 09/23/23  0950      K 4.1   CO2 24      BUN 20   CREATININE 0.8   ALKPHOS 54*   ALT 18   AST 19   BILITOT 0.4      Recent Labs   Lab 09/23/23  0957   INR 1.1    No results for input(s): "CPK", "CPKMB", "TROPONINI", "MB" in the last 168 hours.    Electrolytes: No replacement orders  Accuchecks: Q6H    Gtts:      LDA/Wounds:  Lines/Drains/Airways       Peripheral Intravenous Line  Duration                  Peripheral IV - Single " Lumen 20 G Posterior;Right Hand -- days         Peripheral IV - Single Lumen 09/23/23 0952 20 G Left Antecubital <1 day                  Wounds: Yes  Wound care consulted: No

## 2023-09-23 NOTE — ASSESSMENT & PLAN NOTE
43F with no significant medical history presenting with acute onset of aphasia, confusion, and right facial droop with difficulty swallowing. Last known normal was 9/23/23 4:00AM. Woke up at 8AM with these symptoms. Some improvement in aphasia on presentation, however pt remained mildly confused. Initial NIH scale 1 with aphasia, but pt developed R facial droop with difficulty swallowing, NIH scale 3 on subsequent evaluation with RUE drift as well. Taken for MRI, pending results.    - CT Head showing abrupt truncation of the proximal superior division M2. No evidence of acute intracranial hemorrhage, mass effect, midline shift.  No definite early large territory infarct signs by CT.  - MRI brain showed findings in keeping with recent infarction involving the left frontal cortex and subcortical white matter as well as involving the left centrum semiovale and corona radiata.    - Taken to IR suite for intra-arterial therapy, now s/p thrombectomy and transferred to Neuro ICU

## 2023-09-23 NOTE — NURSING
Patient arrived to [facility] from ED >> IR >> 1685    Report received from: YURIDIA RN    Type of stroke/diagnosis:  L M1 LVO    Thrombectomy end 1145, TICI 2C     Current symptoms: slight expressive aphasia, KERN spont    Skin Assessment done: Yes  Wounds noted: none  *If wounds noted, was Wound Care consulted? Y/N  *If wounds noted, LDA placed? Y/N  Skin Assessment Verified by:  yanira Jara Completed? Pending when can sit up    Patient Belongings on Admit: black sports bra    [facility] notified: JIMENA Jean Baptiste
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
## 2023-09-24 PROBLEM — R47.01 APHASIA: Status: RESOLVED | Noted: 2023-09-23 | Resolved: 2023-09-24

## 2023-09-24 PROBLEM — E78.5 HLD (HYPERLIPIDEMIA): Status: ACTIVE | Noted: 2023-09-24

## 2023-09-24 LAB
ALBUMIN SERPL BCP-MCNC: 3.6 G/DL (ref 3.5–5.2)
ALP SERPL-CCNC: 49 U/L (ref 55–135)
ALT SERPL W/O P-5'-P-CCNC: 11 U/L (ref 10–44)
ANION GAP SERPL CALC-SCNC: 7 MMOL/L (ref 8–16)
AST SERPL-CCNC: 16 U/L (ref 10–40)
BASOPHILS # BLD AUTO: 0.06 K/UL (ref 0–0.2)
BASOPHILS NFR BLD: 0.8 % (ref 0–1.9)
BILIRUB SERPL-MCNC: 0.4 MG/DL (ref 0.1–1)
BUN SERPL-MCNC: 8 MG/DL (ref 6–20)
CALCIUM SERPL-MCNC: 8.6 MG/DL (ref 8.7–10.5)
CHLORIDE SERPL-SCNC: 108 MMOL/L (ref 95–110)
CO2 SERPL-SCNC: 23 MMOL/L (ref 23–29)
CREAT SERPL-MCNC: 0.7 MG/DL (ref 0.5–1.4)
DIFFERENTIAL METHOD: ABNORMAL
EOSINOPHIL # BLD AUTO: 0.1 K/UL (ref 0–0.5)
EOSINOPHIL NFR BLD: 1.9 % (ref 0–8)
ERYTHROCYTE [DISTWIDTH] IN BLOOD BY AUTOMATED COUNT: 12.3 % (ref 11.5–14.5)
ERYTHROCYTE [SEDIMENTATION RATE] IN BLOOD BY PHOTOMETRIC METHOD: 4 MM/HR (ref 0–36)
EST. GFR  (NO RACE VARIABLE): >60 ML/MIN/1.73 M^2
ESTIMATED AVG GLUCOSE: 88 MG/DL (ref 68–131)
GLUCOSE SERPL-MCNC: 119 MG/DL (ref 70–110)
HBA1C MFR BLD: 4.7 % (ref 4–5.6)
HCT VFR BLD AUTO: 35.6 % (ref 37–48.5)
HGB BLD-MCNC: 12.1 G/DL (ref 12–16)
IMM GRANULOCYTES # BLD AUTO: 0.02 K/UL (ref 0–0.04)
IMM GRANULOCYTES NFR BLD AUTO: 0.3 % (ref 0–0.5)
LYMPHOCYTES # BLD AUTO: 2.6 K/UL (ref 1–4.8)
LYMPHOCYTES NFR BLD: 34 % (ref 18–48)
MAGNESIUM SERPL-MCNC: 2.4 MG/DL (ref 1.6–2.6)
MCH RBC QN AUTO: 31.5 PG (ref 27–31)
MCHC RBC AUTO-ENTMCNC: 34 G/DL (ref 32–36)
MCV RBC AUTO: 93 FL (ref 82–98)
MONOCYTES # BLD AUTO: 0.7 K/UL (ref 0.3–1)
MONOCYTES NFR BLD: 9.5 % (ref 4–15)
NEUTROPHILS # BLD AUTO: 4 K/UL (ref 1.8–7.7)
NEUTROPHILS NFR BLD: 53.5 % (ref 38–73)
NRBC BLD-RTO: 0 /100 WBC
PHOSPHATE SERPL-MCNC: 2.4 MG/DL (ref 2.7–4.5)
PLATELET # BLD AUTO: 230 K/UL (ref 150–450)
PMV BLD AUTO: 9.6 FL (ref 9.2–12.9)
POCT GLUCOSE: 141 MG/DL (ref 70–110)
POTASSIUM SERPL-SCNC: 3.5 MMOL/L (ref 3.5–5.1)
PROT SERPL-MCNC: 6.5 G/DL (ref 6–8.4)
RBC # BLD AUTO: 3.84 M/UL (ref 4–5.4)
SODIUM SERPL-SCNC: 138 MMOL/L (ref 136–145)
WBC # BLD AUTO: 7.49 K/UL (ref 3.9–12.7)

## 2023-09-24 PROCEDURE — 85025 COMPLETE CBC W/AUTO DIFF WBC: CPT

## 2023-09-24 PROCEDURE — 99223 1ST HOSP IP/OBS HIGH 75: CPT | Mod: ,,, | Performed by: PSYCHIATRY & NEUROLOGY

## 2023-09-24 PROCEDURE — 25000003 PHARM REV CODE 250: Performed by: PHYSICIAN ASSISTANT

## 2023-09-24 PROCEDURE — 25000003 PHARM REV CODE 250: Performed by: PSYCHIATRY & NEUROLOGY

## 2023-09-24 PROCEDURE — 25000003 PHARM REV CODE 250

## 2023-09-24 PROCEDURE — 92610 EVALUATE SWALLOWING FUNCTION: CPT

## 2023-09-24 PROCEDURE — 99233 SBSQ HOSP IP/OBS HIGH 50: CPT | Mod: ,,,

## 2023-09-24 PROCEDURE — 84100 ASSAY OF PHOSPHORUS: CPT

## 2023-09-24 PROCEDURE — 63600175 PHARM REV CODE 636 W HCPCS: Performed by: PHYSICIAN ASSISTANT

## 2023-09-24 PROCEDURE — 83735 ASSAY OF MAGNESIUM: CPT

## 2023-09-24 PROCEDURE — 63600175 PHARM REV CODE 636 W HCPCS

## 2023-09-24 PROCEDURE — 85652 RBC SED RATE AUTOMATED: CPT

## 2023-09-24 PROCEDURE — 97161 PT EVAL LOW COMPLEX 20 MIN: CPT

## 2023-09-24 PROCEDURE — 80053 COMPREHEN METABOLIC PANEL: CPT

## 2023-09-24 PROCEDURE — 97165 OT EVAL LOW COMPLEX 30 MIN: CPT

## 2023-09-24 PROCEDURE — 99223 PR INITIAL HOSPITAL CARE,LEVL III: ICD-10-PCS | Mod: ,,, | Performed by: PSYCHIATRY & NEUROLOGY

## 2023-09-24 PROCEDURE — 20600001 HC STEP DOWN PRIVATE ROOM

## 2023-09-24 PROCEDURE — 83036 HEMOGLOBIN GLYCOSYLATED A1C: CPT

## 2023-09-24 PROCEDURE — 97530 THERAPEUTIC ACTIVITIES: CPT

## 2023-09-24 PROCEDURE — 99233 PR SUBSEQUENT HOSPITAL CARE,LEVL III: ICD-10-PCS | Mod: ,,,

## 2023-09-24 PROCEDURE — 94761 N-INVAS EAR/PLS OXIMETRY MLT: CPT

## 2023-09-24 RX ORDER — NAPROXEN SODIUM 220 MG/1
81 TABLET, FILM COATED ORAL DAILY
Status: DISCONTINUED | OUTPATIENT
Start: 2023-09-24 | End: 2023-09-25 | Stop reason: HOSPADM

## 2023-09-24 RX ORDER — SODIUM,POTASSIUM PHOSPHATES 280-250MG
2 POWDER IN PACKET (EA) ORAL
Status: DISCONTINUED | OUTPATIENT
Start: 2023-09-24 | End: 2023-09-25 | Stop reason: HOSPADM

## 2023-09-24 RX ORDER — MAGNESIUM SULFATE HEPTAHYDRATE 40 MG/ML
2 INJECTION, SOLUTION INTRAVENOUS ONCE
Status: COMPLETED | OUTPATIENT
Start: 2023-09-24 | End: 2023-09-24

## 2023-09-24 RX ORDER — GABAPENTIN 300 MG/1
600 CAPSULE ORAL 3 TIMES DAILY
Status: DISCONTINUED | OUTPATIENT
Start: 2023-09-24 | End: 2023-09-25 | Stop reason: HOSPADM

## 2023-09-24 RX ORDER — MUPIROCIN 20 MG/G
OINTMENT TOPICAL 2 TIMES DAILY
Status: DISCONTINUED | OUTPATIENT
Start: 2023-09-24 | End: 2023-09-25 | Stop reason: HOSPADM

## 2023-09-24 RX ORDER — METOCLOPRAMIDE HYDROCHLORIDE 5 MG/ML
10 INJECTION INTRAMUSCULAR; INTRAVENOUS ONCE
Status: DISCONTINUED | OUTPATIENT
Start: 2023-09-24 | End: 2023-09-25

## 2023-09-24 RX ORDER — HYDROXYZINE HYDROCHLORIDE 25 MG/1
25 TABLET, FILM COATED ORAL 3 TIMES DAILY PRN
Status: DISCONTINUED | OUTPATIENT
Start: 2023-09-24 | End: 2023-09-25 | Stop reason: HOSPADM

## 2023-09-24 RX ORDER — LANOLIN ALCOHOL/MO/W.PET/CERES
800 CREAM (GRAM) TOPICAL
Status: DISCONTINUED | OUTPATIENT
Start: 2023-09-24 | End: 2023-09-25 | Stop reason: HOSPADM

## 2023-09-24 RX ORDER — MAGNESIUM SULFATE HEPTAHYDRATE 40 MG/ML
2 INJECTION, SOLUTION INTRAVENOUS ONCE
Status: DISCONTINUED | OUTPATIENT
Start: 2023-09-24 | End: 2023-09-25

## 2023-09-24 RX ORDER — METOCLOPRAMIDE HYDROCHLORIDE 5 MG/ML
10 INJECTION INTRAMUSCULAR; INTRAVENOUS EVERY 6 HOURS PRN
Status: DISCONTINUED | OUTPATIENT
Start: 2023-09-24 | End: 2023-09-25 | Stop reason: HOSPADM

## 2023-09-24 RX ORDER — METHOCARBAMOL 750 MG/1
750 TABLET, FILM COATED ORAL 4 TIMES DAILY
Status: DISCONTINUED | OUTPATIENT
Start: 2023-09-24 | End: 2023-09-25 | Stop reason: HOSPADM

## 2023-09-24 RX ORDER — METOCLOPRAMIDE HYDROCHLORIDE 5 MG/ML
10 INJECTION INTRAMUSCULAR; INTRAVENOUS ONCE
Status: COMPLETED | OUTPATIENT
Start: 2023-09-24 | End: 2023-09-24

## 2023-09-24 RX ORDER — HEPARIN SODIUM 5000 [USP'U]/ML
5000 INJECTION, SOLUTION INTRAVENOUS; SUBCUTANEOUS EVERY 8 HOURS
Status: DISCONTINUED | OUTPATIENT
Start: 2023-09-24 | End: 2023-09-25 | Stop reason: HOSPADM

## 2023-09-24 RX ADMIN — SODIUM CHLORIDE 500 ML: 0.9 INJECTION, SOLUTION INTRAVENOUS at 05:09

## 2023-09-24 RX ADMIN — OXYCODONE HYDROCHLORIDE 5 MG: 5 TABLET ORAL at 02:09

## 2023-09-24 RX ADMIN — ASPIRIN 81 MG 81 MG: 81 TABLET ORAL at 02:09

## 2023-09-24 RX ADMIN — SENNOSIDES AND DOCUSATE SODIUM 1 TABLET: 50; 8.6 TABLET ORAL at 08:09

## 2023-09-24 RX ADMIN — GABAPENTIN 600 MG: 300 CAPSULE ORAL at 08:09

## 2023-09-24 RX ADMIN — MAGNESIUM SULFATE HEPTAHYDRATE 2 G: 40 INJECTION, SOLUTION INTRAVENOUS at 05:09

## 2023-09-24 RX ADMIN — MUPIROCIN: 20 OINTMENT TOPICAL at 09:09

## 2023-09-24 RX ADMIN — METHOCARBAMOL 500 MG: 500 TABLET ORAL at 02:09

## 2023-09-24 RX ADMIN — METHOCARBAMOL 500 MG: 500 TABLET ORAL at 08:09

## 2023-09-24 RX ADMIN — OXYCODONE HYDROCHLORIDE 5 MG: 5 TABLET ORAL at 08:09

## 2023-09-24 RX ADMIN — ATORVASTATIN CALCIUM 40 MG: 40 TABLET, FILM COATED ORAL at 08:09

## 2023-09-24 RX ADMIN — ACETAMINOPHEN 650 MG: 325 TABLET ORAL at 08:09

## 2023-09-24 RX ADMIN — METOCLOPRAMIDE 10 MG: 5 INJECTION, SOLUTION INTRAMUSCULAR; INTRAVENOUS at 05:09

## 2023-09-24 RX ADMIN — GABAPENTIN 600 MG: 300 CAPSULE ORAL at 04:09

## 2023-09-24 RX ADMIN — HEPARIN SODIUM 5000 UNITS: 5000 INJECTION INTRAVENOUS; SUBCUTANEOUS at 10:09

## 2023-09-24 RX ADMIN — GABAPENTIN 300 MG: 300 CAPSULE ORAL at 08:09

## 2023-09-24 RX ADMIN — HEPARIN SODIUM 5000 UNITS: 5000 INJECTION INTRAVENOUS; SUBCUTANEOUS at 02:09

## 2023-09-24 RX ADMIN — ACETAMINOPHEN 650 MG: 325 TABLET ORAL at 02:09

## 2023-09-24 RX ADMIN — METHOCARBAMOL 750 MG: 750 TABLET ORAL at 08:09

## 2023-09-24 NOTE — PROGRESS NOTES
HPI:   43F w/ no contributory PMH who presents to [facility] ED with L M1 LVO. Patient was NEHALN @ 0430 this AM when she experienced transitory but progressive aphasia. NIHSS 1 on arrival which improved but then worsened to NIHSS 3. CTA w/ L M1 LVO and MRI with early diffusion changes. Patient taken for emergency IR thrombectomy.        Interval:   9/24: NAEON, AFVSS, Exam markedly improved s/p thrombectomy, CTH w/ small amounts of sylvian extrav stable on repeat        Objective:  E4V5M5, AOx3, mild paraphasic errors;  CNi, PERRL;  FCx4 5/5, SILT     Groin site C/D/I, no hematoma    General: Moderate aphasia  Head: Non-traumatic, normocephalic  Eyes: Pupils equal  Neck: Supple  CVS: Normal rate and rhythm, distal pulses present  Pulm: Symmetric expansion, no respiratory distress  GI: Abdomen nondistended  MSK: Non-traumatic  Skin: Dry, intact  Psych: Moderate aphasia        A&P:  43F w/ no contributory PMH who presents to [facility] ED with L M1 LVO; Neuro-IR consulted for emergent thrombectomy:    --Admitted to Neuro-ICU; Stroke/NCC primary      -q1h neuro-checks  --All labs and diagnostics reviewed  --No further imaging needed per Neuro-IR s/p IR intervention; defer all further imaging to NCC/Stroke services  --Groinchecks complete; catheter site c/d/i without evidence of hematoma  --Maintain SBP <140 for 24h post-intervention  --No further surgical/interventional management at this time  --Continue maximal medical therapy and stroke work-up per NCC/Stroke teams; stable for Anti-plt/coag medications per primary team on post-intervention day 3  --Neuro- IR will sign-off; please call for any additional questions    Dispo: Per stroke/NCC teams

## 2023-09-24 NOTE — ASSESSMENT & PLAN NOTE
43 y.o. female presents with left MCA CVA s/p thrombectomy. No TNK. TICI 2c. Initial NIH 3. NIH on admission 1.   - Admit to [facility]  - Vascular Neurology consulted   - Initial CTA shows L M1 LVO  - MRI Brain W/O Contrast obtained in ED showing findings in keeping with recent infarction involving the left frontal cortex and subcortical white matter as well as involving the left centrum semiovale and corona radiata.     - Post-IR CTH with small amount of contrast extravasation  - Repeat CTH this AM stable.    - q1 neuro checks, vitals, I/Os  - Utox, UA  - EKG, Echo, and CXR pending  - A1c, TSH, and lipid panel pending  - aPTT, PT/INR pending  - CBC, CMP, Mag, and phos daily  - SBP goal < 140  - PRN labetalol and hydralazine  - Statin   - Start ASA and SQH this afternoon  - Rehab efforts: The patient has been evaluated by a stroke team provider and the therapy needs have been fully considered based off the presenting complaints and exam findings. The following therapy evaluations are needed: PT evaluate and treat, OT evaluate and treat, SLP evaluate and treat, PM&R evaluate for appropriate placement  - VTE prophylaxis: mechanical, chemica     - Stable for transfer to floor with VN

## 2023-09-24 NOTE — PROGRESS NOTES
[facility] - Neuro Critical Care  Neurocritical Care  Progress Note    Admit Date: 9/23/2023  Service Date: 09/24/2023  Length of Stay: 1    Subjective:     Chief Complaint: Acute ischemic left MCA stroke    History of Present Illness: Rhonda Ahuja is a 43 y.o. female with no pertinent past medical history that presents with CVA due to L M1 occlusion s/p thrombectomy. Per chart review, the patient noticed that she had speech difficulty at approximately 0800. LKN was 0430. Her aphasia progressively worsened, and she was brought to the ED by her  for further evaluation. Initiall NIHSS was 1, but progressively worsened to 3. CTA Head & Neck was obtained, which showed L M1 LVO. MRI Brain W/WO Contrast showed early diffusion restriction in the posterior left frontal lobe cortex and subcortical white matter. She was taken to IR for cerebral angiogram with aspiration thrombectomy. TICI 2c reperfusion was obtained. The patient will be admitted to [facility] for close monitoring and a higher level of care.       Hospital Course: 09/24/2023. NAEON. CTH post-IR with contrast extravasation. Repeat CTH this AM stable. Migraine cocktail as well as scheduled gabapentin and robaxin added for headaches. Start ASA and SQH. Stable for transfer to floor with VN.       Interval History:  See hospital course above.     Review of Systems   Constitutional:  Negative for chills, fatigue and fever.   HENT:  Negative for trouble swallowing.    Eyes:  Negative for photophobia and visual disturbance.   Respiratory:  Negative for shortness of breath.    Cardiovascular:  Negative for chest pain.   Gastrointestinal:  Negative for nausea and vomiting.   Musculoskeletal:  Negative for neck pain.   Neurological:  Positive for speech difficulty and headaches. Negative for facial asymmetry and weakness.   Psychiatric/Behavioral:  Negative for confusion.        Objective:     Vitals:  Temp: 98.3 °F (36.8 °C)  Pulse: 96  Rhythm: normal sinus  rhythm  BP: 118/69  MAP (mmHg): 88  Resp: 16  SpO2: 100 %    Temp  Min: 98 °F (36.7 °C)  Max: 98.6 °F (37 °C)  Pulse  Min: 72  Max: 106  BP  Min: 89/54  Max: 133/67  MAP (mmHg)  Min: 67  Max: 104  Resp  Min: 11  Max: 33  SpO2  Min: 97 %  Max: 100 %    09/23 0701 - 09/24 0700  In: 772.3 [P.O.:150; I.V.:43.1]  Out: 600 [Urine:600]   Unmeasured Output  Urine Occurrence: 1  Stool Occurrence: 0        Physical Exam  Vitals and nursing note reviewed.   Constitutional:       General: She is not in acute distress.     Appearance: Normal appearance.      Comments: Well developed. Well nourished. Resting comfortably in bed.   HENT:      Head: Normocephalic and atraumatic.      Right Ear: External ear normal.      Left Ear: External ear normal.      Nose: Nose normal.      Mouth/Throat:      Mouth: Mucous membranes are moist.      Pharynx: Oropharynx is clear.   Eyes:      Extraocular Movements: Extraocular movements intact.      Pupils: Pupils are equal, round, and reactive to light.   Cardiovascular:      Rate and Rhythm: Normal rate and regular rhythm.   Pulmonary:      Effort: Pulmonary effort is normal. No respiratory distress.   Abdominal:      General: Abdomen is flat. There is no distension.   Musculoskeletal:      Right lower leg: No edema.      Left lower leg: No edema.   Skin:     General: Skin is warm and dry.   Neurological:      Mental Status: She is alert.      Comments: E4V5M6  Awake, alert, & oriented x 4. Mild expressive aphasia. No dysarthria. Follows commands briskly.   PERRL. EOMI. No facial asymmetry. Conversational hearing intact. S   ISAURO & AG. Strength 5/5 & SILT in all 4 extremities. No pronator drift. Tone normal.         Gait & coordination exams deferred.        Medications:  Continuous Scheduledatorvastatin, 40 mg, Daily  gabapentin, 300 mg, TID  methocarbamoL, 500 mg, QID  mupirocin, , BID  senna-docusate 8.6-50 mg, 1 tablet, BID    PRNacetaminophen, 650 mg, Q6H PRN  hydrALAZINE, 10 mg, Q4H  "PRN  labetalol, 10 mg, Q4H PRN  magnesium oxide, 800 mg, PRN  magnesium oxide, 800 mg, PRN  ondansetron, 8 mg, Q6H PRN  oxyCODONE, 5 mg, Q6H PRN  potassium bicarbonate, 35 mEq, PRN  potassium bicarbonate, 50 mEq, PRN  potassium bicarbonate, 60 mEq, PRN  potassium, sodium phosphates, 2 packet, PRN  potassium, sodium phosphates, 2 packet, PRN  potassium, sodium phosphates, 2 packet, PRN  promethazine (PHENERGAN) 25 mg in dextrose 5 % (D5W) 50 mL IVPB, 25 mg, Q6H PRN  sodium chloride 0.9%, 10 mL, PRN      Today I personally reviewed pertinent medications, lines/drains/airways, imaging, laboratory results, notably:    Laboratory:  CBC:  Recent Labs   Lab 09/24/23  0441   WBC 7.49   RBC 3.84*   HGB 12.1   HCT 35.6*      MCV 93   MCH 31.5*   MCHC 34.0       CMP:  Recent Labs   Lab 09/24/23  0441   CALCIUM 8.6*   ALBUMIN 3.6   PROT 6.5      K 3.5   CO2 23      BUN 8   CREATININE 0.7   ALKPHOS 49*   ALT 11   AST 16   BILITOT 0.4     Recent Labs   Lab 09/24/23  0441   MG 2.4   PHOS 2.4*       Coagulation:  Recent Labs   Lab 09/23/23  0950 09/23/23  0957   LABPROT 10.0  --    INR 0.9 1.1       Lipid Panel:  Recent Labs   Lab 09/23/23  0950   CHOL 277*   HDL 55   LDLCALC 200.0*   TRIG 110       Endocrine:  Recent Labs     09/23/23  0950 09/24/23  0441   HGBA1C  --  4.7   TSH 1.666  --        ABG:  No results for input(s): "PH", "PCO2", "HCO3", "POCSATURATED", "BE" in the last 72 hours.    Urinalysis:  Recent Labs   Lab 09/23/23  1007   COLORU Colorless*   SPECGRAV >1.030*   PHUR 6.0   OCCULTUA Negative   GLUCUA Negative   KETONESU Negative   PROTEINUA Trace*   BILIRUBINUA Negative       Imaging:  CT Head Without Contrast:  Impression:     Interval development of moderate to large volume extra-axial hyperdense material left sylvian fissure extending to the frontal and temporal sulci compatible with extravasated contrast and subarachnoid hemorrhage in light interval thrombectomy.  No significant mass effect or " midline shift.  There is subtle hypoattenuation along the left posterior frontal lobe and insula concerning for edema and infarction.  Clinical correlation, neuro surgical evaluation and close follow-up recommended..   Electronically signed by: Marco Antonio Kenyon DO  Date:                                            09/23/2023  Time:                                           16:46    CT Head Without Contrast:  Impression:  No acute detrimental change relative to most recent head CT 09/23/2023, 16:18 hours.   Similar appearance of left-sided predominant subarachnoid hemorrhage and left hemisphere cerebral edema.   Evolving small posterior left frontal infarct.        Electronically signed by: Royal Pacheco  Date:                                            09/24/2023  Time:                                           08:31    Diet  Diet Adult Regular (IDDSI Level 7)  Diet Adult Regular (IDDSI Level 7)          Assessment/Plan:     Neuro  * Acute ischemic left MCA stroke  43 y.o. female presents with left MCA CVA s/p thrombectomy. No TNK. TICI 2c. Initial NIH 3. NIH on admission 1.   - Admit to [facility]  - Vascular Neurology consulted   - Initial CTA shows L M1 LVO  - MRI Brain W/O Contrast obtained in ED showing findings in keeping with recent infarction involving the left frontal cortex and subcortical white matter as well as involving the left centrum semiovale and corona radiata.     - Post-IR CTH with small amount of contrast extravasation  - Repeat CTH this AM stable.    - q1 neuro checks, vitals, I/Os  - Utox, UA  - EKG, Echo, and CXR pending  - A1c, TSH, and lipid panel pending  - aPTT, PT/INR pending  - CBC, CMP, Mag, and phos daily  - SBP goal < 140  - PRN labetalol and hydralazine  - Statin   - Start ASA and SQH this afternoon  - Rehab efforts: The patient has been evaluated by a stroke team provider and the therapy needs have been fully considered based off the presenting complaints and exam findings. The following  therapy evaluations are needed: PT evaluate and treat, OT evaluate and treat, SLP evaluate and treat, PM&R evaluate for appropriate placement  - VTE prophylaxis: mechanical, chemica     - Stable for transfer to floor with VN    Cardiac/Vascular  HLD (hyperlipidemia)  - Lipid panel with elevated LDL cholesterol and total cholesterol  - Statin          The patient is being Prophylaxed for:  Venous Thromboembolism with: Mechanical or Chemical  Stress Ulcer with: Not Applicable   Ventilator Pneumonia with: not applicable    Activity Orders          Diet Adult Regular (IDDSI Level 7): Regular starting at 09/24 1045    Turn patient starting at 09/23 1400    Elevate HOB starting at 09/23 1229        Full Code    Level III      Ita Mckenzie, PA-C  Neurocritical Care  [facility] - Neuro Critical Care

## 2023-09-24 NOTE — NURSING
Pt brought to CT with ambu bag and continuous monitor. VSS. No complication during scan. Pt brought back to room.

## 2023-09-24 NOTE — ASSESSMENT & PLAN NOTE
43F with no significant medical history that presented with acute onset of aphasia and confusion upon waking up. LKN ~5.5hrs PTA. Some improvement in aphasia on presentation, however she remained confused. Initial NIHSS 1 for aphasia. While in ED patient developed R facial droop with difficulty swallowing, on re-eval NIHSS now 3. CTA concerning for L M2 occlusion, MRI brain obtained which confirmed acute L MCA territory infarct. Neuro IR consulted and patient taken emergently for DSA, now s/p L M1 thrombectomy with TICI 2c reperfusion.     NAEO, neuro exam improved s/p thrombectomy. Currently reports only having R sided paresthesia. Post-IR CTH with contrast extravasation, stable on repeat CTH. Echo with bubble pending. On gabapentin and robaxin for HA. Stable for step down.      Antithrombotics for secondary stroke prevention: Antiplatelets: Aspirin: 81 mg daily  Clopidogrel: 75 mg daily    Statins for secondary stroke prevention and hyperlipidemia, if present:   Statins: Atorvastatin- 40 mg daily    Aggressive risk factor modification: Diet, Exercise     Rehab efforts: The patient has been evaluated by a stroke team provider and the therapy needs have been fully considered based off the presenting complaints and exam findings. The following therapy evaluations are needed: PT evaluate and treat, OT evaluate and treat, SLP evaluate and treat    Diagnostics ordered/pending: TTE to assess cardiac function/status     VTE prophylaxis: Heparin 5000 units SQ every 8 hours  Mechanical prophylaxis: Place SCDs    BP parameters: Infarct: SBP goal <180

## 2023-09-24 NOTE — PT/OT/SLP EVAL
Physical Therapy Co-Evaluation, Treatment, and Discharge     Patient Name:  Rhonda Ahuja   MRN:  [id number]    *co-treatment with OT  2/2 pt with potential impaired ability to tolerate 2 evaluations 2/2 medical status   Recommendations:     Discharge Recommendations: other (no further PT needed)   Discharge Equipment Recommendations: none   Barriers to discharge: None    Assessment:     Rhonda Ahuja is a 43 y.o. female admitted with a medical diagnosis of Acute ischemic left MCA stroke.  She presents with the following impairments/functional limitations:  (none). Pt s/p thrombectomy 9/23/2023. Pt participated in evaluation, demo's independence with all mobility and ADLs, no neurological deficits observed. Pt does not require further acute skilled therapy intervention. Discharge from PT services and re-consult if pt experiences a change in status.     Rehab Prognosis: Good;     Recent Surgery: Procedure(s) (LRB):  ANGIOGRAM-CEREBRAL (CLASS A thrombectomy) (N/A)      Plan:     Plan of Care Expires:  10/24/23    Subjective     Chief Complaint: no complaints   Patient/Family Comments/goals: to get better   Pain/Comfort:  Pain Rating 1: 0/10  Pain Rating Post-Intervention 1: 0/10    Patients cultural, spiritual, Rastafari conflicts given the current situation: no    Living Environment:  Pt lives with her boyfriend in a 2 STH (bed/bath on first floor), 8 TEGAN, HR present.   Prior to admission, patients level of function was independent with mobility and ADLs, assists with caring for her mother who has ALS.  Equipment used at home: none.  DME owned (not currently used): bedside commode, shower chair, and wheelchair.  Upon discharge, patient will have assistance from her boyfriend.    Objective:     Communicated with RN prior to session.  Patient found HOB elevated with blood pressure cuff, pulse ox (continuous), telemetry  upon PT entry to room.    General Precautions: Standard,  (none)  Orthopedic  Precautions:N/A   Braces: N/A  Respiratory Status: Room air    Exams:  Cognitive Exam:  Patient is AAOx4, followed all commands, communicates clearly and fluently  Gross Motor Coordination:  WFL  RLE ROM: WFL  RLE Strength: WFL  LLE ROM: WFL  LLE Strength: WFL    Functional Mobility:  Bed Mobility:     Supine to Sit: independence  Transfers:     Sit to Stand:  independence with no AD  Gait: Pt ambulated 200 ft with no AD and independence. Pt demo'd ability to ambulate with head turns, obstacle negotiation, converse with therapist, and start/stop on command with no LOB, no SOB, no dizziness, maintained independence.       AM-PAC 6 CLICK MOBILITY  Total Score:24       Treatment & Education:  Pt ambulated to restroom, performed toileting and hygiene with independence, VSS throughout.   Attempted to obtain BP following activity, however, unable to obtain a measurement after multiple attempts-RN notified.   Pt educated on role of PT/POC. Pt verbalized understanding.   Pt encouraged to ambulate daily with assistance/supervision from nursing or family. Pt agreeable.      Patient left up in chair with all lines intact, call button in reach, and RN notified.    GOALS:   Multidisciplinary Problems       Physical Therapy Goals       Not on file              Multidisciplinary Problems (Resolved)          Problem: Physical Therapy    Goal Priority Disciplines Outcome Goal Variances Interventions   Physical Therapy Goal   (Resolved)     PT, PT/OT Met                         History:     History reviewed. No pertinent past medical history.    Past Surgical History:   Procedure Laterality Date    BREAST BIOPSY Right     excisional benign    BUNIONECTOMY       SECTION      x 2    TONSILLECTOMY         Time Tracking:     PT Received On: 23  PT Start Time: 0840     PT Stop Time: 0855  PT Total Time (min): 15 min     Billable Minutes: Evaluation 5 mins  and Therapeutic Activity 10 mins       2023

## 2023-09-24 NOTE — HOSPITAL COURSE
09/24/2023 NAEO, neuro exam improved s/p IR. Currently reports only having R sided paresthesia. Post-IR CTH with contrast extravasation, stable on repeat CTH. Echo with bubble pending. On gabapentin and robaxin for HA. Stable for step down.  09/25/2023 NAEO. Yesterday afternoon patient had onset of L eye blurry vision associated with HA, repeat imaging stable and ESR WNL. Currently symptoms completely resolved and exam nonfocal. Echo with mild LAE and negative for shunt. PT/OT dispo recs for home with no further therapy needs. Stable for discharge from stroke standpoint, 30d cardiac event monitor ordered at discharge.

## 2023-09-24 NOTE — HOSPITAL COURSE
09/24/2023. GABRIELA. CTH post-IR with contrast extravasation. Repeat CTH this AM stable. Migraine cocktail as well as scheduled gabapentin and robaxin added for headaches. Start ASA and SQH. Stable for transfer to floor with VN.

## 2023-09-24 NOTE — PLAN OF CARE
"[facility] Care Plan    POC reviewed with Rhonda Ahuja and family at 1400. Pt verbalized understanding. Questions and concerns addressed. No acute events today. Pt progressing toward goals. Will continue to monitor. See below and flowsheets for full assessment and VS info.       -Pt c/o LT eye blurry vision. Repeat CTH obtained. See results.   -Stepdown orders placed awaiting room.           Is this a stroke patient? yes- Stroke booklet reviewed with patient and family, risk factors identified for patient and stroke booklet remains at bedside for ongoing education.     Neuro:  [place] Coma Scale  Best Eye Response: 3-->(E3) to speech  Best Motor Response: 6-->(M6) obeys commands  Best Verbal Response: 5-->(V5) oriented  [place] Coma Scale Score: 14  Assessment Qualifiers: patient not sedated/intubated  Pupil PERRLA: yes     24 hr Temp:  [98 °F (36.7 °C)-98.7 °F (37.1 °C)]     CV:   Rhythm: normal sinus rhythm  BP goals:   SBP < 140  MAP > 65    Resp:           Plan: N/A    GI/:     Diet/Nutrition Received: NPO  Last Bowel Movement: 09/23/23  Voiding Characteristics: voids spontaneously without difficulty    Intake/Output Summary (Last 24 hours) at 9/24/2023 1805  Last data filed at 9/24/2023 0745  Gross per 24 hour   Intake 150 ml   Output 1000 ml   Net -850 ml     Unmeasured Output  Urine Occurrence: 1  Stool Occurrence: 0    Labs/Accuchecks:  Recent Labs   Lab 09/24/23  0441   WBC 7.49   RBC 3.84*   HGB 12.1   HCT 35.6*         Recent Labs   Lab 09/24/23  0441      K 3.5   CO2 23      BUN 8   CREATININE 0.7   ALKPHOS 49*   ALT 11   AST 16   BILITOT 0.4      Recent Labs   Lab 09/23/23  0957   INR 1.1    No results for input(s): "CPK", "CPKMB", "TROPONINI", "MB" in the last 168 hours.    Electrolytes: No replacement orders  Accuchecks: none    Gtts:      LDA/Wounds:  Lines/Drains/Airways       Drain  Duration             Female External Urinary Catheter 09/23/23 2155 <1 day              " Peripheral Intravenous Line  Duration                  Peripheral IV - Single Lumen 20 G Posterior;Right Hand -- days         Peripheral IV - Single Lumen 09/23/23 0952 20 G Left Antecubital 1 day                  Wounds: No  Wound care consulted: No

## 2023-09-24 NOTE — SUBJECTIVE & OBJECTIVE
Interval History:  See hospital course above.     Review of Systems   Constitutional:  Negative for chills, fatigue and fever.   HENT:  Negative for trouble swallowing.    Eyes:  Negative for photophobia and visual disturbance.   Respiratory:  Negative for shortness of breath.    Cardiovascular:  Negative for chest pain.   Gastrointestinal:  Negative for nausea and vomiting.   Musculoskeletal:  Negative for neck pain.   Neurological:  Positive for speech difficulty and headaches. Negative for facial asymmetry and weakness.   Psychiatric/Behavioral:  Negative for confusion.        Objective:     Vitals:  Temp: 98.3 °F (36.8 °C)  Pulse: 96  Rhythm: normal sinus rhythm  BP: 118/69  MAP (mmHg): 88  Resp: 16  SpO2: 100 %    Temp  Min: 98 °F (36.7 °C)  Max: 98.6 °F (37 °C)  Pulse  Min: 72  Max: 106  BP  Min: 89/54  Max: 133/67  MAP (mmHg)  Min: 67  Max: 104  Resp  Min: 11  Max: 33  SpO2  Min: 97 %  Max: 100 %    09/23 0701 - 09/24 0700  In: 772.3 [P.O.:150; I.V.:43.1]  Out: 600 [Urine:600]   Unmeasured Output  Urine Occurrence: 1  Stool Occurrence: 0        Physical Exam  Vitals and nursing note reviewed.   Constitutional:       General: She is not in acute distress.     Appearance: Normal appearance.      Comments: Well developed. Well nourished. Resting comfortably in bed.   HENT:      Head: Normocephalic and atraumatic.      Right Ear: External ear normal.      Left Ear: External ear normal.      Nose: Nose normal.      Mouth/Throat:      Mouth: Mucous membranes are moist.      Pharynx: Oropharynx is clear.   Eyes:      Extraocular Movements: Extraocular movements intact.      Pupils: Pupils are equal, round, and reactive to light.   Cardiovascular:      Rate and Rhythm: Normal rate and regular rhythm.   Pulmonary:      Effort: Pulmonary effort is normal. No respiratory distress.   Abdominal:      General: Abdomen is flat. There is no distension.   Musculoskeletal:      Right lower leg: No edema.      Left lower leg:  No edema.   Skin:     General: Skin is warm and dry.   Neurological:      Mental Status: She is alert.      Comments: E4V5M6  Awake, alert, & oriented x 4. Mild expressive aphasia. No dysarthria. Follows commands briskly.   PERRL. EOMI. No facial asymmetry. Conversational hearing intact. S   ISAURO & AG. Strength 5/5 & SILT in all 4 extremities. No pronator drift. Tone normal.         Gait & coordination exams deferred.        Medications:  Continuous Scheduledatorvastatin, 40 mg, Daily  gabapentin, 300 mg, TID  methocarbamoL, 500 mg, QID  mupirocin, , BID  senna-docusate 8.6-50 mg, 1 tablet, BID    PRNacetaminophen, 650 mg, Q6H PRN  hydrALAZINE, 10 mg, Q4H PRN  labetalol, 10 mg, Q4H PRN  magnesium oxide, 800 mg, PRN  magnesium oxide, 800 mg, PRN  ondansetron, 8 mg, Q6H PRN  oxyCODONE, 5 mg, Q6H PRN  potassium bicarbonate, 35 mEq, PRN  potassium bicarbonate, 50 mEq, PRN  potassium bicarbonate, 60 mEq, PRN  potassium, sodium phosphates, 2 packet, PRN  potassium, sodium phosphates, 2 packet, PRN  potassium, sodium phosphates, 2 packet, PRN  promethazine (PHENERGAN) 25 mg in dextrose 5 % (D5W) 50 mL IVPB, 25 mg, Q6H PRN  sodium chloride 0.9%, 10 mL, PRN      Today I personally reviewed pertinent medications, lines/drains/airways, imaging, laboratory results, notably:    Laboratory:  CBC:  Recent Labs   Lab 09/24/23  0441   WBC 7.49   RBC 3.84*   HGB 12.1   HCT 35.6*      MCV 93   MCH 31.5*   MCHC 34.0       CMP:  Recent Labs   Lab 09/24/23  0441   CALCIUM 8.6*   ALBUMIN 3.6   PROT 6.5      K 3.5   CO2 23      BUN 8   CREATININE 0.7   ALKPHOS 49*   ALT 11   AST 16   BILITOT 0.4     Recent Labs   Lab 09/24/23  0441   MG 2.4   PHOS 2.4*       Coagulation:  Recent Labs   Lab 09/23/23  0950 09/23/23  0957   LABPROT 10.0  --    INR 0.9 1.1       Lipid Panel:  Recent Labs   Lab 09/23/23  0950   CHOL 277*   HDL 55   LDLCALC 200.0*   TRIG 110       Endocrine:  Recent Labs     09/23/23  0950 09/24/23  0441  "  HGBA1C  --  4.7   TSH 1.666  --        ABG:  No results for input(s): "PH", "PCO2", "HCO3", "POCSATURATED", "BE" in the last 72 hours.    Urinalysis:  Recent Labs   Lab 09/23/23  1007   COLORU Colorless*   SPECGRAV >1.030*   PHUR 6.0   OCCULTUA Negative   GLUCUA Negative   KETONESU Negative   PROTEINUA Trace*   BILIRUBINUA Negative       Imaging:  CT Head Without Contrast:  Impression:     Interval development of moderate to large volume extra-axial hyperdense material left sylvian fissure extending to the frontal and temporal sulci compatible with extravasated contrast and subarachnoid hemorrhage in light interval thrombectomy.  No significant mass effect or midline shift.  There is subtle hypoattenuation along the left posterior frontal lobe and insula concerning for edema and infarction.  Clinical correlation, neuro surgical evaluation and close follow-up recommended..   Electronically signed by: Marco Antonio Kenyon DO  Date:                                            09/23/2023  Time:                                           16:46    CT Head Without Contrast:  Impression:  No acute detrimental change relative to most recent head CT 09/23/2023, 16:18 hours.   Similar appearance of left-sided predominant subarachnoid hemorrhage and left hemisphere cerebral edema.   Evolving small posterior left frontal infarct.        Electronically signed by: Royal Pacheco  Date:                                            09/24/2023  Time:                                           08:31    Diet  Diet Adult Regular (IDDSI Level 7)  Diet Adult Regular (IDDSI Level 7)        "

## 2023-09-24 NOTE — PLAN OF CARE
Problem: Occupational Therapy  Goal: Occupational Therapy Goal  Outcome: Met     Pt was agreeable to and participated in OT/PT evaluation.  Pt's PLOF was I with ADLs and func mobility.  Pt completed OT evaluation and noted to perform func mobility and ADLs with mod I to I.  Pt will have assistance/supervision as needed once discharged home.  Due to these factors, pt is discharged from OT services.  No DME or post acute OT required at this time.     Pearl Barreto, OT  9/24/2023

## 2023-09-24 NOTE — PT/OT/SLP EVAL
Occupational Therapy   Evaluation and Discharge Note    Name: Rhonda Ahuja  MRN: [id number]  Admitting Diagnosis: Acute ischemic left MCA stroke  Recent Surgery: Procedure(s) (LRB):  ANGIOGRAM-CEREBRAL (CLASS A thrombectomy) (N/A)      Recommendations:     Discharge Recommendations: home  Discharge Equipment Recommendations: none  Barriers to discharge:  None    Assessment:     Rhonda Ahuja is a 43 y.o. female with a medical diagnosis of Acute ischemic left MCA stroke. Pt was agreeable to and participated in OT/PT evaluation.  Pt's PLOF was I with ADLs and func mobility.  Pt completed OT evaluation and noted to perform func mobility and ADLs with mod I to I.  Pt will have assistance/supervision as needed once discharged home.  Due to these factors, pt is discharged from OT services.  No DME or post acute OT required at this time.     Plan:     During this hospitalization, patient does not require further acute OT services.  Please re-consult if situation changes.    Plan of Care Reviewed with: patient, significant other    Subjective     Chief Complaint: N/A  Patient/Family Comments/goals: return home    Occupational Profile:  Living Environment: pt lives with SO in 2  with 8 TEGAN to enter - B HR for steps to enter home, but far apart - bed/bath on first floor - WIS with built in bench  Previous level of function: independent  Equipment Used at home: none (Has access to, but doesn't use:  w/c, RW, SC, BSC)  Assistance upon Discharge: as needed    Pain/Comfort:  Pain Rating 1: 0/10  Pain Rating Post-Intervention 1: 0/10    Patients cultural, spiritual, Baptist conflicts given the current situation: no    Objective:     Communicated with: nurse prior to session.  Patient found HOB elevated with blood pressure cuff, PureWick, peripheral IV, pulse ox (continuous), telemetry upon OT entry to room.    General Precautions: Standard, fall  Orthopedic Precautions: N/A  Braces: N/A  Respiratory Status: Room air      Occupational Performance:    Bed Mobility:    Patient completed Scooting/Bridging with independence  Patient completed Supine to Sit with independence    Functional Mobility/Transfers:  Patient completed Sit <> Stand Transfer with independence  with  no assistive device   Patient completed Bed <> Chair Transfer using Step Transfer technique with independence with no assistive device  Patient completed Toilet Transfer Step Transfer technique with independence with  no AD  Functional Mobility: Pt able to walk in hallway and within room with PT without any physical assistance or LOB noted - refer to PT chayo for details    Activities of Daily Living:  Grooming: independence - washed hands standing at sink  Upper Body Dressing: independence - donned hospital gown as robe while seated EOB  Lower Body Dressing: independence - donned B socks sitting EOB  Toileting: independence - completed all steps using toilet    Cognitive/Visual Perceptual:  Cognitive/Psychosocial Skills:     -       Oriented to: Person, Place, Time, and Situation   -       Follows Commands/attention:Follows two-step commands  -       Communication: clear/fluent  -       Memory: No Deficits noted  -       Safety awareness/insight to disability: intact   -       Mood/Affect/Coping skills/emotional control: Appropriate to situation    Physical Exam:  Balance: -       Sit = good;  Stand = good  Postural examination/scapula alignment:    -       No postural abnormalities identified  Edema:  None noted  Upper Extremity Range of Motion:   BUEs = WFL  Upper Extremity Strength:  BUEs = WFL   Strength:  BUEs = WFL    AMPAC 6 Click ADL:  AMPAC Total Score: 24    Treatment & Education:  Pt completed ADLs and func mobility activities for tx session as noted above  Pt educated on role of OT and POC      Patient left up in chair with all lines intact, call button in reach, nurse notified, and SO present    GOALS:   Multidisciplinary Problems       Occupational  Therapy Goals       Not on file              Multidisciplinary Problems (Resolved)          Problem: Occupational Therapy    Goal Priority Disciplines Outcome Interventions   Occupational Therapy Goal   (Resolved)     OT, PT/OT Met                        History:     History reviewed. No pertinent past medical history.      Past Surgical History:   Procedure Laterality Date    BREAST BIOPSY Right     excisional benign    BUNIONECTOMY       SECTION      x 2    TONSILLECTOMY         Time Tracking:     OT Date of Treatment: 23  OT Start Time: 0840  OT Stop Time: 54  OT Total Time (min): 14 min    Billable Minutes:Evaluation 14 min (coeval with PT)    2023

## 2023-09-24 NOTE — PROGRESS NOTES
[facility]  Vascular Neurology  [facility]  Progress Note    Assessment/Plan:     * Acute ischemic left MCA stroke  43F with no significant medical history that presented with acute onset of aphasia and confusion upon waking up. LKN ~5.5hrs PTA. Some improvement in aphasia on presentation, however she remained confused. Initial NIHSS 1 for aphasia. While in ED patient developed R facial droop with difficulty swallowing, on re-eval NIHSS now 3. CTA concerning for L M2 occlusion, MRI brain obtained which confirmed acute L MCA territory infarct. Neuro IR consulted and patient taken emergently for DSA, now s/p L M1 thrombectomy with TICI 2c reperfusion.     NAEO, neuro exam improved s/p thrombectomy. Currently reports only having R sided paresthesia. Post-IR CTH with contrast extravasation, stable on repeat CTH. Echo with bubble pending. On gabapentin and robaxin for HA. Stable for step down.      Antithrombotics for secondary stroke prevention: Antiplatelets: Aspirin: 81 mg daily  Clopidogrel: 75 mg daily    Statins for secondary stroke prevention and hyperlipidemia, if present:   Statins: Atorvastatin- 40 mg daily    Aggressive risk factor modification: Diet, Exercise     Rehab efforts: The patient has been evaluated by a stroke team provider and the therapy needs have been fully considered based off the presenting complaints and exam findings. The following therapy evaluations are needed: PT evaluate and treat, OT evaluate and treat, SLP evaluate and treat    Diagnostics ordered/pending: TTE to assess cardiac function/status     VTE prophylaxis: Heparin 5000 units SQ every 8 hours  Mechanical prophylaxis: Place SCDs    BP parameters: Infarct: SBP goal <180        HLD (hyperlipidemia)  Stroke risk factor  , goal <70    -Atorvastatin 40mg daily           09/24/2023 NAEO, neuro exam improved s/p IR. Currently reports only having R sided paresthesia. Post-IR CTH with contrast  extravasation, stable on repeat CTH. Echo with bubble pending. On gabapentin and robaxin for HA. Stable for step down.      STROKE DOCUMENTATION   Acute Stroke Times   Last Known Normal Date: 09/23/23  Last Known Normal Time: 0400  Stroke Team Called Date: 09/23/23  Stroke Team Called Time: 0928  Stroke Team Arrival Date: 09/23/23  Stroke Team Arrival Time: 0932  CT Interpretation Time: 0949    NIH Scale:  1a. Level of Consciousness: 0-->Alert, keenly responsive  1b. LOC Questions: 0-->Answers both questions correctly  1c. LOC Commands: 0-->Performs both tasks correctly  2. Best Gaze: 0-->Normal  3. Visual: 0-->No visual loss  4. Facial Palsy: 0-->Normal symmetrical movements  5a. Motor Arm, Left: 0-->No drift, limb holds 90 (or 45) degrees for full 10 secs  5b. Motor Arm, Right: 0-->No drift, limb holds 90 (or 45) degrees for full 10 secs  6a. Motor Leg, Left: 0-->No drift, leg holds 30 degree position for full 5 secs  6b. Motor Leg, Right: 0-->No drift, leg holds 30 degree position for full 5 secs  7. Limb Ataxia: 0-->Absent  8. Sensory: 1-->Mild-to-moderate sensory loss, patient feels pinprick is less sharp or is dull on the affected side, or there is a loss of superficial pain with pinprick, but patient is aware of being touched  9. Best Language: 0-->No aphasia, normal  10. Dysarthria: 0-->Normal  11. Extinction and Inattention (formerly Neglect): 0-->No abnormality  Total (NIH Stroke Scale): 1       Modified Jacqueline    [place] Coma Scale:    ABCD2 Score:    [id number] Score:   HAS -BLED Score:   ICH Score:   Hunt & Deleon Classification:      Hemorrhagic change of an Ischemic Stroke: Does this patient have an ischemic stroke with hemorrhagic changes? No     Neurologic Chief Complaint: L MCA stroke    Subjective:     Interval History: Patient is seen for follow-up neurological assessment and treatment recommendations:   NAEO, neuro exam improved s/p IR. Currently reports only having R sided paresthesia. Post-IR  CTH with contrast extravasation, stable on repeat CTH. Echo with bubble pending. On gabapentin and robaxin for HA. Stable for step down.    HPI, Past Medical, Family, and Social History remains the same as documented in the initial encounter.     Review of Systems   Constitutional:  Negative for fatigue.   HENT:  Negative for drooling and trouble swallowing.    Eyes:  Negative for visual disturbance.   Respiratory:  Negative for choking.    Cardiovascular:  Negative for palpitations.   Gastrointestinal:  Negative for nausea and vomiting.   Musculoskeletal:  Negative for neck stiffness.   Skin:  Negative for color change.   Neurological:  Positive for numbness and headaches. Negative for speech difficulty and weakness.   Psychiatric/Behavioral:  Negative for confusion.    All other systems reviewed and are negative.    Scheduled Meds:   aspirin  81 mg Oral Daily    atorvastatin  40 mg Oral Daily    gabapentin  300 mg Oral TID    heparin (porcine)  5,000 Units Subcutaneous Q8H    methocarbamoL  500 mg Oral QID    mupirocin   Nasal BID    senna-docusate 8.6-50 mg  1 tablet Oral BID     Continuous Infusions:  PRN Meds:acetaminophen, hydrALAZINE, labetalol, magnesium oxide, magnesium oxide, ondansetron, oxyCODONE, potassium bicarbonate, potassium bicarbonate, potassium bicarbonate, potassium, sodium phosphates, potassium, sodium phosphates, potassium, sodium phosphates, promethazine (PHENERGAN) 25 mg in dextrose 5 % (D5W) 50 mL IVPB, sodium chloride 0.9%    Objective:     Vital Signs (Most Recent):  Temp: 98.3 °F (36.8 °C) (09/24/23 1145)  Pulse: 96 (09/24/23 1301)  Resp: 16 (09/24/23 1145)  BP: 118/69 (09/24/23 1301)  SpO2: 100 % (09/24/23 1301)  BP Location: Right arm    Vital Signs Range (Last 24H):  Temp:  [98 °F (36.7 °C)-98.6 °F (37 °C)]   Pulse:  []   Resp:  [11-26]   BP: ()/(52-84)   SpO2:  [97 %-100 %]   BP Location: Right arm       Physical Exam  Vitals and nursing note reviewed.    Constitutional:       General: She is not in acute distress.  HENT:      Head: Normocephalic.      Nose: Nose normal.      Mouth/Throat:      Pharynx: No oropharyngeal exudate or posterior oropharyngeal erythema.   Eyes:      Extraocular Movements: Extraocular movements intact.      Conjunctiva/sclera: Conjunctivae normal.   Cardiovascular:      Rate and Rhythm: Normal rate.   Pulmonary:      Effort: Pulmonary effort is normal. No respiratory distress.   Abdominal:      General: There is no distension.   Musculoskeletal:         General: No deformity or signs of injury.      Cervical back: Normal range of motion. No rigidity.   Skin:     General: Skin is warm and dry.   Neurological:      Mental Status: She is alert and oriented to person, place, and time.      Cranial Nerves: No dysarthria or facial asymmetry.      Sensory: Sensory deficit present.      Motor: No weakness, tremor or seizure activity.      Coordination: Coordination is intact.   Psychiatric:         Attention and Perception: Attention normal.         Behavior: Behavior normal. Behavior is cooperative.              Neurological Exam:   LOC: alert  Attention Span: Good   Language: No aphasia  Articulation: No dysarthria  Orientation: Person, Place, Time   Visual Fields: Full  EOM (CN III, IV, VI): Full/intact  Facial Movement (CN VII): Symmetric facial expression    Motor: Arm left  Normal 5/5  Leg left  Normal 5/5  Arm right  Normal 5/5  Leg right Normal 5/5  Sensation: Intact to light touch    Laboratory:  CMP:   Recent Labs   Lab 09/24/23  0441   CALCIUM 8.6*   ALBUMIN 3.6   PROT 6.5      K 3.5   CO2 23      BUN 8   CREATININE 0.7   ALKPHOS 49*   ALT 11   AST 16   BILITOT 0.4     CBC:   Recent Labs   Lab 09/24/23  0441   WBC 7.49   RBC 3.84*   HGB 12.1   HCT 35.6*      MCV 93   MCH 31.5*   MCHC 34.0     Lipid Panel:   Recent Labs   Lab 09/23/23  0950   CHOL 277*   LDLCALC 200.0*   HDL 55   TRIG 110     Coagulation:   Recent  Labs   Lab 09/23/23  0957   INR 1.1     Hgb A1C:   Recent Labs   Lab 09/24/23  0441   HGBA1C 4.7     TSH:   Recent Labs   Lab 09/23/23  0950   TSH 1.666       Diagnostic Results     Brain Imaging   CTH without contrast 9/24/2023  Impression:  No acute detrimental change relative to most recent head CT 09/23/2023, 16:18 hours.   Similar appearance of left-sided predominant subarachnoid hemorrhage and left hemisphere cerebral edema.   Evolving small posterior left frontal infarct.     CTH without contrast 9/23/2032  Impression:  Interval development of moderate to large volume extra-axial hyperdense material left sylvian fissure extending to the frontal and temporal sulci compatible with extravasated contrast and subarachnoid hemorrhage in light interval thrombectomy.   No significant mass effect or midline shift.   There is subtle hypoattenuation along the left posterior frontal lobe and insula concerning for edema and infarction.   Clinical correlation, neuro surgical evaluation and close follow-up recommended..     MRI brain without contrast 9/23/2023  Impression:  1. Findings in keeping with recent infarction involving the left frontal cortex and subcortical white matter as well as involving the left centrum semiovale and corona radiata.  No associated macroscopic hemorrhage or mass effect at the present time.   2. Blooming of susceptibility related signal loss corresponding to area of thrombus within the left MCA as seen on earlier same day CTA.  Additionally, there is serpiginous FLAIR hyperintense signal within the sulci about the left cerebral hemisphere, in keeping with slow and/or disorganized flow related to proximal stenosis/occlusion.       Vessel Imaging   IR cerebral angiogram 9/23/2023  Preliminary Interpretation:   1. Left M1 direct aspiration thrombectomy with TICI 2C revascularization  2. Small ledge at the proximal ICA was noted concerning for possible web  3. Otherwise normal cerebral  angiogram.  (Please see Imaging report for full details)    CTA stroke multiphase 9/23/2023  Impression:  1. Abrupt truncation of the proximal superior division M2.  There is intermittent thready reconstitution distally, with multifocal additional short segment high-grade stenoses/focal occlusions of M2 branches within the sylvian fissure.   2. No evidence of acute intracranial hemorrhage, mass effect, midline shift.  No definite early large territory infarct signs by CT.   3. Additional details as above.       Cardiac Imaging   TTE with bubble pending      JIMENA Rojas  [facility]  Department of Vascular Neurology   [facility]

## 2023-09-24 NOTE — PT/OT/SLP EVAL
Speech Language Pathology Evaluation  Bedside Swallow    Patient Name:  Rhonda Ahuja   MRN:  [id number]  Admitting Diagnosis: Acute ischemic left MCA stroke    Recommendations:                 General Recommendations:  Speech language evaluation, Cognitive-linguistic evaluation, and diet check  Diet recommendations:  Regular Diet - IDDSI Level 7, Thin liquids - IDDSI Level 0   Aspiration Precautions: Standard aspiration precautions   General Precautions: Standard, fall  Communication strategies:  none    Assessment:     Rhonda Ahuja is a 43 y.o. female with an SLP diagnosis of  functional oropharyngeal swallow .  She is safe for a regular diet with thin liquids. ST to f/u to ensure tolerance and to complete cognitive communication evaluation.     History:     History reviewed. No pertinent past medical history.    Past Surgical History:   Procedure Laterality Date    BREAST BIOPSY Right     excisional benign    BUNIONECTOMY       SECTION      x 2    TONSILLECTOMY         Social History: Patient lives with spouse.    Prior Intubation HX:  none on file    Modified Barium Swallow: none on file    Chest X-Rays: - No acute abnormality or detrimental change     Prior diet: regular.    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    Subjective     Spoke with RN prior to entering pt's room . Pt seen bedside for session with  present. Alert and agreeable to ST.     Pain/Comfort:  Pain Rating 1: 0/10  Pain Rating Post-Intervention 1: 0/10    Respiratory Status: Room air    Objective:     Oral Musculature Evaluation  Oral Musculature: WFL  Dentition: present and adequate  Mucosal Quality: good  Oral Labial Strength and Mobility: WFL  Lingual Strength and Mobility: WFL  Volitional Cough: elicited  Volitional Swallow: elicited  Voice Prior to PO Intake: clear    Bedside Swallow Eval:   Consistencies Assessed:  Thin liquids via cup and single/consecutive straw sips  Puree  applesauce  Solids crackers      Oral Phase:   WFL    Pharyngeal Phase:   no overt clinical signs/symptoms of aspiration  no overt clinical signs/symptoms of pharyngeal dysphagia    Compensatory Strategies  None    Treatment: Provided education to pt and spouse re: role of ST, POC, swallow precautions, recommendations for regular consistency diet with thin liquids, and plan to f/u to ensure tolerance and complete cognitive communication evaluation. They verbalized understanding. White board updated. RN and MD notified of results and recs.      Goals:   Multidisciplinary Problems       SLP Goals          Problem: SLP    Goal Priority Disciplines Outcome   SLP Goal     SLP    Description: Goals expected to be met by 10/1:  1. Pt will tolerate least restrictive diet without overt s/sx airway threat.   2. Pt will participate in cognitive-linguistic evaluation to further develop POC.                            Plan:     Patient to be seen:  3 x/week   Plan of Care expires:  10/23/23  Plan of Care reviewed with:  patient, spouse   SLP Follow-Up:  Yes       Discharge recommendations:  home   Barriers to Discharge:  None    Time Tracking:     SLP Treatment Date:   09/24/23  Speech Start Time:  0951  Speech Stop Time:  0958     Speech Total Time (min):  7 min    Billable Minutes: Eval Swallow and Oral Function 7    09/24/2023

## 2023-09-24 NOTE — PLAN OF CARE
Bedside swallow assessment completed. Recommend regular texture diet with thin liquids. Order requested from team. ST to f/u to ensure tolerance and to complete cognitive-linguistic evaluation. Sybil Christine CCC-SLP 9/24/2023 11:22 AM

## 2023-09-24 NOTE — PLAN OF CARE
Pt participated in evaluation, demo's independence with all mobility and ADLs, no neurological deficits observed. Pt does not require further acute skilled therapy intervention. Discharge from PT services and re-consult if pt experiences a change in status.

## 2023-09-24 NOTE — PLAN OF CARE
Patient is alert and oriented with no communication barriers. Patient is not on coumadin or dialysis at this time. Patient denies any DME equipment. Cm will follow for any additional needs.    09/24/23 1528   Discharge Assessment   Assessment Type Discharge Planning Assessment   Confirmed/corrected address, phone number and insurance Yes   Confirmed Demographics Correct on Facesheet   Source of Information patient   People in Home child(vamshi), dependent;spouse   Do you expect to return to your current living situation? Yes   Do you have help at home or someone to help you manage your care at home? No   Prior to hospitilization cognitive status: Alert/Oriented   Current cognitive status: Alert/Oriented   Equipment Currently Used at Home none   Readmission within 30 days? No   Patient currently being followed by outpatient case management? No   Do you currently have service(s) that help you manage your care at home? No   Do you take prescription medications? Yes   Do you have prescription coverage? Yes   Is the patient taking medications as prescribed? yes   Who is going to help you get home at discharge?    How do you get to doctors appointments? car, drives self;family or friend will provide   Are you on dialysis? No   Do you take coumadin? No   DME Needed Upon Discharge  none   Transition of Care Barriers None   Discharge Plan A Home     [facility]  Initial Discharge Assessment       Primary Care Provider: No, Primary Doctor    Admission Diagnosis: Stroke [I63.9]    Admission Date: 9/23/2023  Expected Discharge Date:     Transition of Care Barriers: (P) None    Payor: /     Extended Emergency Contact Information  Primary Emergency Contact: Meera Cameron  Mobile Phone: [phone]  Relation: Sister   needed? No    Discharge Plan A: (P) [place]         [facility] [id number] [place] [facility] [place] [address] [facility]  [address] [facility] [address]  Phone:  [phone] Fax: [phone]      Initial Assessment (most recent)       Adult Discharge Assessment - 09/24/23 1528          Discharge Assessment    Assessment Type Discharge Planning Assessment (P)      Confirmed/corrected address, phone number and insurance Yes (P)      Confirmed Demographics Correct on Facesheet (P)      Source of Information patient (P)      People in Home child(vamshi), dependent;spouse (P)      Do you expect to return to your current living situation? Yes (P)      Do you have help at home or someone to help you manage your care at home? No (P)      Prior to hospitilization cognitive status: Alert/Oriented (P)      Current cognitive status: Alert/Oriented (P)      Equipment Currently Used at Home none (P)      Readmission within 30 days? No (P)      Patient currently being followed by outpatient case management? No (P)      Do you currently have service(s) that help you manage your care at home? No (P)      Do you take prescription medications? Yes (P)      Do you have prescription coverage? Yes (P)      Is the patient taking medications as prescribed? yes (P)      Who is going to help you get home at discharge?  (P)      How do you get to doctors appointments? car, drives self;family or friend will provide (P)      Are you on dialysis? No (P)      Do you take coumadin? No (P)      DME Needed Upon Discharge  none (P)      Transition of Care Barriers None (P)      Discharge Plan A Home (P)

## 2023-09-24 NOTE — SUBJECTIVE & OBJECTIVE
Neurologic Chief Complaint: L MCA stroke    Subjective:     Interval History: Patient is seen for follow-up neurological assessment and treatment recommendations:   NAEO, neuro exam improved s/p IR. Currently reports only having R sided paresthesia. Post-IR CTH with contrast extravasation, stable on repeat CTH. Echo with bubble pending. On gabapentin and robaxin for HA. Stable for step down.    HPI, Past Medical, Family, and Social History remains the same as documented in the initial encounter.     Review of Systems   Constitutional:  Negative for fatigue.   HENT:  Negative for drooling and trouble swallowing.    Eyes:  Negative for visual disturbance.   Respiratory:  Negative for choking.    Cardiovascular:  Negative for palpitations.   Gastrointestinal:  Negative for nausea and vomiting.   Musculoskeletal:  Negative for neck stiffness.   Skin:  Negative for color change.   Neurological:  Positive for numbness and headaches. Negative for speech difficulty and weakness.   Psychiatric/Behavioral:  Negative for confusion.    All other systems reviewed and are negative.    Scheduled Meds:   aspirin  81 mg Oral Daily    atorvastatin  40 mg Oral Daily    gabapentin  300 mg Oral TID    heparin (porcine)  5,000 Units Subcutaneous Q8H    methocarbamoL  500 mg Oral QID    mupirocin   Nasal BID    senna-docusate 8.6-50 mg  1 tablet Oral BID     Continuous Infusions:  PRN Meds:acetaminophen, hydrALAZINE, labetalol, magnesium oxide, magnesium oxide, ondansetron, oxyCODONE, potassium bicarbonate, potassium bicarbonate, potassium bicarbonate, potassium, sodium phosphates, potassium, sodium phosphates, potassium, sodium phosphates, promethazine (PHENERGAN) 25 mg in dextrose 5 % (D5W) 50 mL IVPB, sodium chloride 0.9%    Objective:     Vital Signs (Most Recent):  Temp: 98.3 °F (36.8 °C) (09/24/23 1145)  Pulse: 96 (09/24/23 1301)  Resp: 16 (09/24/23 1145)  BP: 118/69 (09/24/23 1301)  SpO2: 100 % (09/24/23 1301)  BP Location: Right  arm    Vital Signs Range (Last 24H):  Temp:  [98 °F (36.7 °C)-98.6 °F (37 °C)]   Pulse:  []   Resp:  [11-26]   BP: ()/(52-84)   SpO2:  [97 %-100 %]   BP Location: Right arm       Physical Exam  Vitals and nursing note reviewed.   Constitutional:       General: She is not in acute distress.  HENT:      Head: Normocephalic.      Nose: Nose normal.      Mouth/Throat:      Pharynx: No oropharyngeal exudate or posterior oropharyngeal erythema.   Eyes:      Extraocular Movements: Extraocular movements intact.      Conjunctiva/sclera: Conjunctivae normal.   Cardiovascular:      Rate and Rhythm: Normal rate.   Pulmonary:      Effort: Pulmonary effort is normal. No respiratory distress.   Abdominal:      General: There is no distension.   Musculoskeletal:         General: No deformity or signs of injury.      Cervical back: Normal range of motion. No rigidity.   Skin:     General: Skin is warm and dry.   Neurological:      Mental Status: She is alert and oriented to person, place, and time.      Cranial Nerves: No dysarthria or facial asymmetry.      Sensory: Sensory deficit present.      Motor: No weakness, tremor or seizure activity.      Coordination: Coordination is intact.   Psychiatric:         Attention and Perception: Attention normal.         Behavior: Behavior normal. Behavior is cooperative.              Neurological Exam:   LOC: alert  Attention Span: Good   Language: No aphasia  Articulation: No dysarthria  Orientation: Person, Place, Time   Visual Fields: Full  EOM (CN III, IV, VI): Full/intact  Facial Movement (CN VII): Symmetric facial expression    Motor: Arm left  Normal 5/5  Leg left  Normal 5/5  Arm right  Normal 5/5  Leg right Normal 5/5  Sensation: Intact to light touch    Laboratory:  CMP:   Recent Labs   Lab 09/24/23  0441   CALCIUM 8.6*   ALBUMIN 3.6   PROT 6.5      K 3.5   CO2 23      BUN 8   CREATININE 0.7   ALKPHOS 49*   ALT 11   AST 16   BILITOT 0.4     CBC:   Recent Labs    Lab 09/24/23  0441   WBC 7.49   RBC 3.84*   HGB 12.1   HCT 35.6*      MCV 93   MCH 31.5*   MCHC 34.0     Lipid Panel:   Recent Labs   Lab 09/23/23  0950   CHOL 277*   LDLCALC 200.0*   HDL 55   TRIG 110     Coagulation:   Recent Labs   Lab 09/23/23  0957   INR 1.1     Hgb A1C:   Recent Labs   Lab 09/24/23  0441   HGBA1C 4.7     TSH:   Recent Labs   Lab 09/23/23  0950   TSH 1.666       Diagnostic Results     Brain Imaging   CTH without contrast 9/24/2023  Impression:  No acute detrimental change relative to most recent head CT 09/23/2023, 16:18 hours.   Similar appearance of left-sided predominant subarachnoid hemorrhage and left hemisphere cerebral edema.   Evolving small posterior left frontal infarct.     CTH without contrast 9/23/2032  Impression:  Interval development of moderate to large volume extra-axial hyperdense material left sylvian fissure extending to the frontal and temporal sulci compatible with extravasated contrast and subarachnoid hemorrhage in light interval thrombectomy.   No significant mass effect or midline shift.   There is subtle hypoattenuation along the left posterior frontal lobe and insula concerning for edema and infarction.   Clinical correlation, neuro surgical evaluation and close follow-up recommended..     MRI brain without contrast 9/23/2023  Impression:  1. Findings in keeping with recent infarction involving the left frontal cortex and subcortical white matter as well as involving the left centrum semiovale and corona radiata.  No associated macroscopic hemorrhage or mass effect at the present time.   2. Blooming of susceptibility related signal loss corresponding to area of thrombus within the left MCA as seen on earlier same day CTA.  Additionally, there is serpiginous FLAIR hyperintense signal within the sulci about the left cerebral hemisphere, in keeping with slow and/or disorganized flow related to proximal stenosis/occlusion.       Vessel Imaging   IR cerebral  angiogram 9/23/2023  Preliminary Interpretation:   1. Left M1 direct aspiration thrombectomy with TICI 2C revascularization  2. Small ledge at the proximal ICA was noted concerning for possible web  3. Otherwise normal cerebral angiogram.  (Please see Imaging report for full details)    CTA stroke multiphase 9/23/2023  Impression:  1. Abrupt truncation of the proximal superior division M2.  There is intermittent thready reconstitution distally, with multifocal additional short segment high-grade stenoses/focal occlusions of M2 branches within the sylvian fissure.   2. No evidence of acute intracranial hemorrhage, mass effect, midline shift.  No definite early large territory infarct signs by CT.   3. Additional details as above.       Cardiac Imaging   TTE with bubble pending

## 2023-09-25 VITALS
DIASTOLIC BLOOD PRESSURE: 65 MMHG | HEART RATE: 75 BPM | RESPIRATION RATE: 16 BRPM | BODY MASS INDEX: 24.07 KG/M2 | WEIGHT: 141 LBS | TEMPERATURE: 98 F | SYSTOLIC BLOOD PRESSURE: 111 MMHG | OXYGEN SATURATION: 100 % | HEIGHT: 64 IN

## 2023-09-25 PROBLEM — I63.412 EMBOLIC STROKE INVOLVING LEFT MIDDLE CEREBRAL ARTERY: Status: ACTIVE | Noted: 2023-09-23

## 2023-09-25 LAB
ALBUMIN SERPL BCP-MCNC: 3.2 G/DL (ref 3.5–5.2)
ALP SERPL-CCNC: 41 U/L (ref 55–135)
ALT SERPL W/O P-5'-P-CCNC: 12 U/L (ref 10–44)
ANION GAP SERPL CALC-SCNC: 6 MMOL/L (ref 8–16)
ASCENDING AORTA: 2.56 CM
AST SERPL-CCNC: 13 U/L (ref 10–40)
AV INDEX (PROSTH): 1.03
AV MEAN GRADIENT: 4 MMHG
AV PEAK GRADIENT: 7 MMHG
AV VALVE AREA BY VELOCITY RATIO: 2.57 CM²
AV VALVE AREA: 2.71 CM²
AV VELOCITY RATIO: 0.98
BASOPHILS # BLD AUTO: 0.1 K/UL (ref 0–0.2)
BASOPHILS NFR BLD: 1.4 % (ref 0–1.9)
BILIRUB SERPL-MCNC: 0.1 MG/DL (ref 0.1–1)
BSA FOR ECHO PROCEDURE: 1.7 M2
BUN SERPL-MCNC: 11 MG/DL (ref 6–20)
CALCIUM SERPL-MCNC: 8.9 MG/DL (ref 8.7–10.5)
CHLORIDE SERPL-SCNC: 111 MMOL/L (ref 95–110)
CO2 SERPL-SCNC: 23 MMOL/L (ref 23–29)
CREAT SERPL-MCNC: 0.7 MG/DL (ref 0.5–1.4)
CV ECHO LV RWT: 0.38 CM
DIFFERENTIAL METHOD: ABNORMAL
DOP CALC AO PEAK VEL: 1.35 M/S
DOP CALC AO VTI: 23.54 CM
DOP CALC LVOT AREA: 2.6 CM2
DOP CALC LVOT DIAMETER: 1.83 CM
DOP CALC LVOT PEAK VEL: 1.32 M/S
DOP CALC LVOT STROKE VOLUME: 63.83 CM3
DOP CALCLVOT PEAK VEL VTI: 24.28 CM
E/E' RATIO: 6.67 M/S
ECHO LV POSTERIOR WALL: 0.7 CM (ref 0.6–1.1)
EOSINOPHIL # BLD AUTO: 0.2 K/UL (ref 0–0.5)
EOSINOPHIL NFR BLD: 3.3 % (ref 0–8)
ERYTHROCYTE [DISTWIDTH] IN BLOOD BY AUTOMATED COUNT: 12.1 % (ref 11.5–14.5)
EST. GFR  (NO RACE VARIABLE): >60 ML/MIN/1.73 M^2
FRACTIONAL SHORTENING: 45 % (ref 28–44)
GLUCOSE SERPL-MCNC: 105 MG/DL (ref 70–110)
HCT VFR BLD AUTO: 34.7 % (ref 37–48.5)
HGB BLD-MCNC: 11.2 G/DL (ref 12–16)
IMM GRANULOCYTES # BLD AUTO: 0.02 K/UL (ref 0–0.04)
IMM GRANULOCYTES NFR BLD AUTO: 0.3 % (ref 0–0.5)
INTERVENTRICULAR SEPTUM: 0.78 CM (ref 0.6–1.1)
IVRT: 74.22 MSEC
LA MAJOR: 5.73 CM
LA MINOR: 5.61 CM
LA WIDTH: 3.74 CM
LEFT ATRIUM SIZE: 3.76 CM
LEFT ATRIUM VOLUME INDEX MOD: 37.3 ML/M2
LEFT ATRIUM VOLUME INDEX: 40.1 ML/M2
LEFT ATRIUM VOLUME MOD: 63.1 CM3
LEFT ATRIUM VOLUME: 67.77 CM3
LEFT INTERNAL DIMENSION IN SYSTOLE: 2.04 CM (ref 2.1–4)
LEFT VENTRICLE DIASTOLIC VOLUME INDEX: 33.89 ML/M2
LEFT VENTRICLE DIASTOLIC VOLUME: 57.27 ML
LEFT VENTRICLE MASS INDEX: 43 G/M2
LEFT VENTRICLE SYSTOLIC VOLUME INDEX: 7.9 ML/M2
LEFT VENTRICLE SYSTOLIC VOLUME: 13.37 ML
LEFT VENTRICULAR INTERNAL DIMENSION IN DIASTOLE: 3.68 CM (ref 3.5–6)
LEFT VENTRICULAR MASS: 73.44 G
LV LATERAL E/E' RATIO: 5.91 M/S
LV SEPTAL E/E' RATIO: 7.65 M/S
LYMPHOCYTES # BLD AUTO: 3.1 K/UL (ref 1–4.8)
LYMPHOCYTES NFR BLD: 42.3 % (ref 18–48)
MAGNESIUM SERPL-MCNC: 1.9 MG/DL (ref 1.6–2.6)
MCH RBC QN AUTO: 31.8 PG (ref 27–31)
MCHC RBC AUTO-ENTMCNC: 32.3 G/DL (ref 32–36)
MCV RBC AUTO: 99 FL (ref 82–98)
MONOCYTES # BLD AUTO: 0.6 K/UL (ref 0.3–1)
MONOCYTES NFR BLD: 8.4 % (ref 4–15)
MV PEAK E VEL: 1.3 M/S
NEUTROPHILS # BLD AUTO: 3.3 K/UL (ref 1.8–7.7)
NEUTROPHILS NFR BLD: 44.3 % (ref 38–73)
NRBC BLD-RTO: 0 /100 WBC
PHOSPHATE SERPL-MCNC: 3.1 MG/DL (ref 2.7–4.5)
PISA TR MAX VEL: 2.66 M/S
PLATELET # BLD AUTO: 266 K/UL (ref 150–450)
PMV BLD AUTO: 9.3 FL (ref 9.2–12.9)
POTASSIUM SERPL-SCNC: 4 MMOL/L (ref 3.5–5.1)
PROT SERPL-MCNC: 5.8 G/DL (ref 6–8.4)
RA MAJOR: 4.82 CM
RA PRESSURE ESTIMATED: 3 MMHG
RA WIDTH: 3.34 CM
RBC # BLD AUTO: 3.52 M/UL (ref 4–5.4)
RV TB RVSP: 6 MMHG
SINUS: 2.69 CM
SODIUM SERPL-SCNC: 140 MMOL/L (ref 136–145)
STJ: 2.21 CM
TDI LATERAL: 0.22 M/S
TDI SEPTAL: 0.17 M/S
TDI: 0.2 M/S
TR MAX PG: 28 MMHG
TRICUSPID ANNULAR PLANE SYSTOLIC EXCURSION: 1.84 CM
TV REST PULMONARY ARTERY PRESSURE: 31 MMHG
WBC # BLD AUTO: 7.36 K/UL (ref 3.9–12.7)
Z-SCORE OF LEFT VENTRICULAR DIMENSION IN END DIASTOLE: -2.46
Z-SCORE OF LEFT VENTRICULAR DIMENSION IN END SYSTOLE: -2.82

## 2023-09-25 PROCEDURE — 84100 ASSAY OF PHOSPHORUS: CPT

## 2023-09-25 PROCEDURE — 63600175 PHARM REV CODE 636 W HCPCS

## 2023-09-25 PROCEDURE — 25000003 PHARM REV CODE 250

## 2023-09-25 PROCEDURE — 99233 PR SUBSEQUENT HOSPITAL CARE,LEVL III: ICD-10-PCS | Mod: ,,, | Performed by: PSYCHIATRY & NEUROLOGY

## 2023-09-25 PROCEDURE — 83735 ASSAY OF MAGNESIUM: CPT

## 2023-09-25 PROCEDURE — 25000003 PHARM REV CODE 250: Performed by: PHYSICIAN ASSISTANT

## 2023-09-25 PROCEDURE — 80053 COMPREHEN METABOLIC PANEL: CPT

## 2023-09-25 PROCEDURE — 92523 SPEECH SOUND LANG COMPREHEN: CPT

## 2023-09-25 PROCEDURE — 99233 SBSQ HOSP IP/OBS HIGH 50: CPT | Mod: ,,, | Performed by: PSYCHIATRY & NEUROLOGY

## 2023-09-25 PROCEDURE — 94761 N-INVAS EAR/PLS OXIMETRY MLT: CPT

## 2023-09-25 PROCEDURE — 85025 COMPLETE CBC W/AUTO DIFF WBC: CPT

## 2023-09-25 PROCEDURE — 97535 SELF CARE MNGMENT TRAINING: CPT

## 2023-09-25 RX ORDER — ATORVASTATIN CALCIUM 40 MG/1
40 TABLET, FILM COATED ORAL DAILY
Qty: 90 TABLET | Refills: 3 | Status: SHIPPED | OUTPATIENT
Start: 2023-09-26 | End: 2023-12-18

## 2023-09-25 RX ORDER — CLOPIDOGREL BISULFATE 75 MG/1
75 TABLET ORAL DAILY
Qty: 30 TABLET | Refills: 0 | Status: SHIPPED | OUTPATIENT
Start: 2023-09-26 | End: 2023-12-13

## 2023-09-25 RX ORDER — CLOPIDOGREL BISULFATE 75 MG/1
75 TABLET ORAL DAILY
Qty: 30 TABLET | Refills: 0 | Status: SHIPPED | OUTPATIENT
Start: 2023-09-26 | End: 2023-09-25 | Stop reason: SDUPTHER

## 2023-09-25 RX ORDER — NAPROXEN SODIUM 220 MG/1
81 TABLET, FILM COATED ORAL DAILY
Qty: 30 TABLET | Refills: 11 | Status: SHIPPED | OUTPATIENT
Start: 2023-09-26 | End: 2023-09-25 | Stop reason: SDUPTHER

## 2023-09-25 RX ORDER — ATORVASTATIN CALCIUM 40 MG/1
40 TABLET, FILM COATED ORAL DAILY
Qty: 90 TABLET | Refills: 3 | Status: SHIPPED | OUTPATIENT
Start: 2023-09-26 | End: 2023-09-25 | Stop reason: SDUPTHER

## 2023-09-25 RX ORDER — NAPROXEN SODIUM 220 MG/1
81 TABLET, FILM COATED ORAL DAILY
Qty: 30 TABLET | Refills: 11 | Status: SHIPPED | OUTPATIENT
Start: 2023-09-26 | End: 2023-12-18 | Stop reason: SDUPTHER

## 2023-09-25 RX ORDER — CLOPIDOGREL BISULFATE 75 MG/1
75 TABLET ORAL DAILY
Status: DISCONTINUED | OUTPATIENT
Start: 2023-09-25 | End: 2023-09-25 | Stop reason: HOSPADM

## 2023-09-25 RX ADMIN — HEPARIN SODIUM 5000 UNITS: 5000 INJECTION INTRAVENOUS; SUBCUTANEOUS at 06:09

## 2023-09-25 RX ADMIN — METHOCARBAMOL 750 MG: 750 TABLET ORAL at 08:09

## 2023-09-25 RX ADMIN — METHOCARBAMOL 750 MG: 750 TABLET ORAL at 04:09

## 2023-09-25 RX ADMIN — OXYCODONE HYDROCHLORIDE 5 MG: 5 TABLET ORAL at 07:09

## 2023-09-25 RX ADMIN — HEPARIN SODIUM 5000 UNITS: 5000 INJECTION INTRAVENOUS; SUBCUTANEOUS at 01:09

## 2023-09-25 RX ADMIN — GABAPENTIN 600 MG: 300 CAPSULE ORAL at 08:09

## 2023-09-25 RX ADMIN — ATORVASTATIN CALCIUM 40 MG: 40 TABLET, FILM COATED ORAL at 08:09

## 2023-09-25 RX ADMIN — METHOCARBAMOL 750 MG: 750 TABLET ORAL at 01:09

## 2023-09-25 RX ADMIN — ACETAMINOPHEN 650 MG: 325 TABLET ORAL at 02:09

## 2023-09-25 RX ADMIN — ASPIRIN 81 MG 81 MG: 81 TABLET ORAL at 08:09

## 2023-09-25 RX ADMIN — CLOPIDOGREL BISULFATE 75 MG: 75 TABLET ORAL at 04:09

## 2023-09-25 RX ADMIN — GABAPENTIN 600 MG: 300 CAPSULE ORAL at 02:09

## 2023-09-25 RX ADMIN — ACETAMINOPHEN 650 MG: 325 TABLET ORAL at 07:09

## 2023-09-25 NOTE — NURSING
Pt discharged home via personal vehicle with boyfriend and children. Pt sent home with laptop, phone, chargers, backpack, medications, + duffel bag. PIV x2 removed.  Medications obtained prior to discharge. Discharge instructions and follow up appointments reviewed. Questions and concerns addressed with both patient and .

## 2023-09-25 NOTE — ANESTHESIA POSTPROCEDURE EVALUATION
Anesthesia Post Evaluation    Patient: Rhonda Ahuja    Procedure(s) Performed: IR ANGIOGRAM CEREBRAL INTRACRANIAL EA ADD VESSEL    Final Anesthesia Type: general      Patient location during evaluation: PACU  Patient participation: Yes- Able to Participate  Level of consciousness: awake and alert  Post-procedure vital signs: reviewed and stable  Pain management: adequate  Airway patency: patent  JESÚS mitigation strategies: Extubation and recovery carried out in lateral, semiupright, or other nonsupine position  PONV status at discharge: No PONV  Anesthetic complications: no      Cardiovascular status: blood pressure returned to baseline  Respiratory status: room air  Hydration status: euvolemic  Follow-up not needed.          Vitals Value Taken Time   BP 95/63 09/25/23 1003   Temp 37.9 °C (100.3 °F) 09/25/23 0702   Pulse 81 09/25/23 1018   Resp 16 09/25/23 0722   SpO2 98 % 09/25/23 1018   Vitals shown include unvalidated device data.      No case tracking events are documented in the log.      Pain/Silvano Score: Pain Rating Prior to Med Admin: 7 (9/25/2023  7:22 AM)

## 2023-09-25 NOTE — DISCHARGE SUMMARY
[facility]  Neurocritical Care  Discharge Summary    Admit Date: 9/23/2023    Service Date: 09/25/2023    Discharge Date:  09/25/2023    Length of Stay: 2    Final Active Diagnoses:    Diagnosis Date Noted POA    PRINCIPAL PROBLEM:  Embolic stroke involving left middle cerebral artery [I63.412] 09/23/2023 Yes    HLD (hyperlipidemia) [E78.5] 09/24/2023 Yes      Problems Resolved During this Admission:    Diagnosis Date Noted Date Resolved POA    Aphasia [R47.01] 09/23/2023 09/24/2023 Yes      History of Present Illness: Rhonda Ahuja is a 43 y.o. female with no pertinent past medical history that presents with CVA due to L M1 occlusion s/p thrombectomy. Per chart review, the patient noticed that she had speech difficulty at approximately 0800. LKN was 0430. Her aphasia progressively worsened, and she was brought to the ED by her  for further evaluation. Initiall NIHSS was 1, but progressively worsened to 3. CTA Head & Neck was obtained, which showed L M1 LVO. MRI Brain W/WO Contrast showed early diffusion restriction in the posterior left frontal lobe cortex and subcortical white matter. She was taken to IR for cerebral angiogram with aspiration thrombectomy. TICI 2c reperfusion was obtained. The patient will be admitted to [facility] for close monitoring and a higher level of care.       Hospital Course by Event: 09/24/2023. NAEON. CTH post-IR with contrast extravasation. Repeat CTH this AM stable. Migraine cocktail as well as scheduled gabapentin and robaxin added for headaches. Start ASA and SQH. Stable for transfer to floor with VN.       Hospital Course by Problem:   * Embolic stroke involving left middle cerebral artery  43 y.o. female presents with left MCA CVA s/p thrombectomy. No TNK. TICI 2c. Initial NIH 3. NIH on admission 1.     Stable for discharge per Vascular Neurology    - Initial CTA shows L M1 LVO  - MRI Brain W/O Contrast obtained in ED showing findings in keeping with  recent infarction involving the left frontal cortex and subcortical white matter as well as involving the left centrum semiovale and corona radiata.     - Post-IR CTH with small amount of contrast extravasation  - q1 neuro checks, vitals, I/Os  - Utox, UA  - EKG, Echo, and CXR pending  - A1c, TSH, and lipid panel pending  - aPTT, PT/INR pending  - CBC, CMP, Mag, and phos daily  - SBP goal < 140  - PRN labetalol and hydralazine  - Statin   - ASA and SQH      HLD (hyperlipidemia)  - Lipid panel with elevated LDL cholesterol and total cholesterol  - Statin        Goals of Care Treatment Preferences:  Code Status: Full Code      Laboratory:  Lab Results   Component Value Date    HGBA1C 4.7 09/24/2023    CHOL 277 (H) 09/23/2023    HDL 55 09/23/2023    LDLCALC 200.0 (H) 09/23/2023    TRIG 110 09/23/2023    TSH 1.666 09/23/2023       Pending Results:     Consultations:  IP CONSULT TO VASCULAR (STROKE) NEUROLOGY  IP CONSULT TO PHYSICAL MEDICINE REHAB  IP CONSULT TO SOCIAL WORK/CASE MANAGEMENT      Procedures:   Procedure(s) (LRB):  ANGIOGRAM-CEREBRAL (CLASS A thrombectomy) (N/A) by SurgeonTiffanie.      Medications:      Medication List      START taking these medications    aspirin 81 MG Chew  Chew and swallow 1 tablet (81 mg total) by mouth once daily.  Start taking on: September 26, 2023     atorvastatin 40 MG tablet  Commonly known as: LIPITOR  Take 1 tablet (40 mg total) by mouth once daily.  Start taking on: September 26, 2023     clopidogreL 75 mg tablet  Commonly known as: PLAVIX  Take 1 tablet (75 mg total) by mouth once daily.  Start taking on: September 26, 2023        CONTINUE taking these medications    hydrOXYzine HCL 25 MG tablet  Commonly known as: ATARAX  Take 1 tablet (25 mg total) by mouth 3 (three) times daily as needed for Anxiety.           Where to Get Your Medications      These medications were sent to [facility]  [address] [facility] [address]    Hours: Mon-Fri 7a-7p,  SatNelly 10a-4p Phone: [phone]   · aspirin 81 MG Chew  · atorvastatin 40 MG tablet  · clopidogreL 75 mg tablet       Diet: As tolerated    Activity: As tolerated.     Disposition: Discharged to home in stable condition.    Follow Up Plan:  PCP  Vascular neurology     This discharge took 30 minutes to complete.    Joni Roberts MD  Neurocritical Care  [facility]

## 2023-09-25 NOTE — PT/OT/SLP EVAL
Speech Language Pathology Evaluation  Cognitive Communication  And Discharge Summary    Patient Name:  Rhonda Ahuja   MRN:  [id number]  Admitting Diagnosis: Acute ischemic left MCA stroke    Recommendations:     Recommendations:                General Recommendations:  Follow-up not indicated  Diet recommendations:  Regular Diet - IDDSI Level 7, Thin liquids - IDDSI Level 0   Aspiration Precautions: Standard aspiration precautions   General Precautions: Standard, fall  Communication strategies:  none    Assessment:     Rhonda Ahuja is a 43 y.o. female with an SLP diagnosis of  functional oropharyngeal swallow and no evidence of cognitive communication impairment.  .  She presents with baseline level of functioning. No further ST needs at this time.    History:     Past Medical History:   Diagnosis Date    HLD (hyperlipidemia) 2023       Past Surgical History:   Procedure Laterality Date    BREAST BIOPSY Right     excisional benign    BUNIONECTOMY       SECTION      x 2    TONSILLECTOMY          Social History: Patient lives with spouse and 2 children.     Prior Intubation HX:  none on file     Modified Barium Swallow: none on file     Chest X-Rays: - No acute abnormality or detrimental change      Prior diet: regular.     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      Subjective     Spoke with RN prior to entering pt's room . Pt seen bedside for session with spouse present. Alert and agreeable to ST.       Pain/Comfort:  Pain Rating 1: 0/10  Pain Rating Post-Intervention 1: 0/10    Respiratory Status: Room air    Objective:     Cognitive Status:    Arousal/Alertness Appropriate response to stimuli  Orientation Oriented x4  Memory WFL  Problem Solving Conclusions WFL, Categories WFL, Sequencing WFL, and Solutions WFL  Safety awareness good       Receptive Language:   Comprehension:      WFL    Pragmatics:    WFL    Expressive Language:  Verbal:    Naming  Confrontation intact, Convergent intact, Divergent responsive intact (named 20 items in 1 minute), and Single word responsive naming intact  Sentence formulation WFL  Conversational speech WFL      Motor Speech:  WFL    Voice:   WFL    Visual-Spatial:  WFL    Reading:   WFL     Written Expression:   WFL    Treatment: Pt also seen over part of regular breakfast with thin liquids. Tolerated without overt s/sx airway threat. Functional oropharyngeal swallow. Provided education to patient and spouse re: role of ST, stroke risk factors and prevention, BE FAST, as well as plan to discontinue ST services as pt currently functioning at baseline with no acute needs. They verbalized understanding and in agreement with plan. At end of session, pt remained bedside with call light and all needs in reach. White board updated.      Goals:   Multidisciplinary Problems       SLP Goals          Problem: SLP    Goal Priority Disciplines Outcome   SLP Goal     SLP    Description: Goals expected to be met by 10/1:  1. Pt will tolerate least restrictive diet without overt s/sx airway threat.   2. Pt will participate in cognitive-linguistic evaluation to further develop POC.                            Plan:   Patient to be seen:  3 x/week   Plan of Care expires:  10/23/23  Plan of Care reviewed with:  patient, spouse   SLP Follow-Up:  No       Discharge recommendations:  Discharge Facility/Level of Care Needs: home   Barriers to Discharge:  None    Time Tracking:     SLP Treatment Date:   09/25/23  Speech Start Time:  0855  Speech Stop Time:  0910     Speech Total Time (min):  15 min    Billable Minutes: Eval 7  and Self Care/Home Management Training 8    09/25/2023

## 2023-09-25 NOTE — PROGRESS NOTES
[facility] - Neuro Critical Care  Vascular Neurology  [facility]  Progress Note    Assessment/Plan:     * Embolic stroke involving left middle cerebral artery  43F with no significant medical history that presented with acute onset of aphasia and confusion upon waking up. LKN ~5.5hrs PTA. Some improvement in aphasia on presentation, however she remained confused. Initial NIHSS 1 for aphasia. While in ED patient developed R facial droop with difficulty swallowing, on re-eval NIHSS now 3. CTA concerning for L M2 occlusion, MRI brain obtained which confirmed acute L MCA territory infarct. Neuro IR consulted and patient taken emergently for DSA, now s/p L M1 thrombectomy with TICI 2c reperfusion. Post-IR CTH with contrast extravasation, stable on repeat CTH. Echo with bubble with normal EF, mild LAE, and negative for PFO. Suspect etiology of stroke to be ESUS at this time.    DAPT x 30 days + statin for stroke prevention. 30d cardiac event monitor ordered at discharge for further evaluation, also will need hypercoag workup which will be ordered at  follow up in 4-6 weeks.      Antithrombotics for secondary stroke prevention: Antiplatelets: Aspirin: 81 mg daily  Clopidogrel: 75 mg daily for DAPT x 30 days, followed by ASA monotherapy    Statins for secondary stroke prevention and hyperlipidemia, if present:   Statins: Atorvastatin- 40 mg daily    Aggressive risk factor modification: Diet, Exercise     Rehab efforts: PT/OT/SLP evals complete, recs for discharge home with no needs    Diagnostics ordered/pending: None     VTE prophylaxis: Heparin 5000 units SQ every 8 hours  Mechanical prophylaxis: Place SCDs    BP parameters: Infarct: SBP goal <180        HLD (hyperlipidemia)  Stroke risk factor  , goal <70    -Atorvastatin 40mg daily           09/24/2023 NAEO, neuro exam improved s/p IR. Currently reports only having R sided paresthesia. Post-IR CTH with contrast extravasation, stable on repeat  CTH. Echo with bubble pending. On gabapentin and robaxin for HA. Stable for step down.  09/25/2023 NAEO. Yesterday afternoon patient had onset of L eye blurry vision associated with HA, repeat imaging stable and ESR WNL. Currently symptoms completely resolved and exam nonfocal. Echo with mild LAE and negative for shunt. PT/OT dispo recs for home with no further therapy needs. Stable for discharge from stroke standpoint, 30d cardiac event monitor ordered at discharge.       STROKE DOCUMENTATION   Acute Stroke Times   Last Known Normal Date: 09/23/23  Last Known Normal Time: 0400  Stroke Team Called Date: 09/23/23  Stroke Team Called Time: 0928  Stroke Team Arrival Date: 09/23/23  Stroke Team Arrival Time: 0932  CT Interpretation Time: 0949    NIH Scale:  1a. Level of Consciousness: 0-->Alert, keenly responsive  1b. LOC Questions: 0-->Answers both questions correctly  1c. LOC Commands: 0-->Performs both tasks correctly  2. Best Gaze: 0-->Normal  3. Visual: 0-->No visual loss  4. Facial Palsy: 0-->Normal symmetrical movements  5a. Motor Arm, Left: 0-->No drift, limb holds 90 (or 45) degrees for full 10 secs  5b. Motor Arm, Right: 0-->No drift, limb holds 90 (or 45) degrees for full 10 secs  6a. Motor Leg, Left: 0-->No drift, leg holds 30 degree position for full 5 secs  6b. Motor Leg, Right: 0-->No drift, leg holds 30 degree position for full 5 secs  7. Limb Ataxia: 0-->Absent  8. Sensory: 0-->Normal, no sensory loss  9. Best Language: 0-->No aphasia, normal  10. Dysarthria: 0-->Normal  11. Extinction and Inattention (formerly Neglect): 0-->No abnormality  Total (NIH Stroke Scale): 0       Modified Floyd Score: 0  Gwendolyn Coma Scale:    ABCD2 Score:    [id number] Score:   HAS -BLED Score:   ICH Score:   Hunt & Deleon Classification:      Hemorrhagic change of an Ischemic Stroke: Does this patient have an ischemic stroke with hemorrhagic changes? No     Neurologic Chief Complaint: L MCA stroke    Subjective:      Interval History: Patient is seen for follow-up neurological assessment and treatment recommendations:   NAEO. Yesterday afternoon patient had onset of L eye blurry vision associated with HA, repeat imaging stable and ESR WNL. Currently symptoms completely resolved and exam nonfocal. Echo with mild LAE and negative for shunt. PT/OT dispo recs for home with no further therapy needs. Stable for discharge from stroke standpoint, 30d cardiac event monitor ordered at discharge.     HPI, Past Medical, Family, and Social History remains the same as documented in the initial encounter.     Review of Systems   Constitutional:  Negative for fatigue.   HENT:  Negative for drooling and trouble swallowing.    Eyes:  Visual disturbance: L eye blurry vision, resolved.   Respiratory:  Negative for choking.    Cardiovascular:  Negative for palpitations.   Gastrointestinal:  Negative for nausea and vomiting.   Musculoskeletal:  Negative for neck stiffness.   Skin:  Negative for color change.   Neurological:  Negative for speech difficulty and weakness. Numbness: resolved. Headaches: resolved.  Psychiatric/Behavioral:  Negative for confusion.    All other systems reviewed and are negative.    Scheduled Meds:   aspirin  81 mg Oral Daily    atorvastatin  40 mg Oral Daily    gabapentin  600 mg Oral TID    heparin (porcine)  5,000 Units Subcutaneous Q8H    methocarbamoL  750 mg Oral QID    metoclopramide HCl  10 mg Intravenous Once    mupirocin   Nasal BID    senna-docusate 8.6-50 mg  1 tablet Oral BID     Continuous Infusions:  PRN Meds:acetaminophen, hydrALAZINE, hydrOXYzine HCL, labetalol, magnesium oxide, magnesium oxide, metoclopramide HCl, ondansetron, oxyCODONE, potassium bicarbonate, potassium bicarbonate, potassium bicarbonate, potassium, sodium phosphates, potassium, sodium phosphates, potassium, sodium phosphates, promethazine (PHENERGAN) 25 mg in dextrose 5 % (D5W) 50 mL IVPB    Objective:     Vital Signs (Most  Recent):  Temp: 98.6 °F (37 °C) (09/25/23 1102)  Pulse: 81 (09/25/23 1202)  Resp: 16 (09/25/23 0722)  BP: 105/68 (09/25/23 1202)  SpO2: 98 % (09/25/23 1202)  BP Location: Right arm    Vital Signs Range (Last 24H):  Temp:  [98.6 °F (37 °C)-100.3 °F (37.9 °C)]   Pulse:  [75-91]   Resp:  [14-16]   BP: ()/(50-68)   SpO2:  [96 %-100 %]   BP Location: Right arm       Physical Exam  Vitals and nursing note reviewed.   Constitutional:       General: She is not in acute distress.  HENT:      Head: Normocephalic.      Nose: Nose normal.      Mouth/Throat:      Pharynx: No oropharyngeal exudate or posterior oropharyngeal erythema.   Eyes:      Extraocular Movements: Extraocular movements intact.      Conjunctiva/sclera: Conjunctivae normal.   Cardiovascular:      Rate and Rhythm: Normal rate.   Pulmonary:      Effort: Pulmonary effort is normal. No respiratory distress.   Abdominal:      General: There is no distension.   Musculoskeletal:         General: No deformity or signs of injury.      Cervical back: Normal range of motion. No rigidity.   Skin:     General: Skin is warm and dry.   Neurological:      General: No focal deficit present.      Mental Status: She is alert and oriented to person, place, and time.      Cranial Nerves: No dysarthria or facial asymmetry.      Sensory: No sensory deficit.      Motor: No weakness, tremor or seizure activity.      Coordination: Coordination is intact.   Psychiatric:         Attention and Perception: Attention normal.         Behavior: Behavior normal. Behavior is cooperative.              Neurological Exam:   LOC: alert  Attention Span: Good   Language: No aphasia  Articulation: No dysarthria  Orientation: Person, Place, Time   Visual Fields: Full  EOM (CN III, IV, VI): Full/intact  Facial Movement (CN VII): Symmetric facial expression    Motor: Arm left  Normal 5/5  Leg left  Normal 5/5  Arm right  Normal 5/5  Leg right Normal 5/5  Sensation: Intact to light  touch    Laboratory:  CMP:   Recent Labs   Lab 09/25/23  0304   CALCIUM 8.9   ALBUMIN 3.2*   PROT 5.8*      K 4.0   CO2 23   *   BUN 11   CREATININE 0.7   ALKPHOS 41*   ALT 12   AST 13   BILITOT 0.1       CBC:   Recent Labs   Lab 09/25/23  0304   WBC 7.36   RBC 3.52*   HGB 11.2*   HCT 34.7*      MCV 99*   MCH 31.8*   MCHC 32.3       Lipid Panel:   Recent Labs   Lab 09/23/23  0950   CHOL 277*   LDLCALC 200.0*   HDL 55   TRIG 110       Coagulation:   Recent Labs   Lab 09/23/23  0957   INR 1.1       Hgb A1C:   Recent Labs   Lab 09/24/23  0441   HGBA1C 4.7       TSH:   Recent Labs   Lab 09/23/23  0950   TSH 1.666         Diagnostic Results     Brain Imaging   CTH without contrast 9/24/2023  Impression:  No acute detrimental change relative to most recent head CT 09/23/2023, 16:18 hours.   Similar appearance of left-sided predominant subarachnoid hemorrhage and left hemisphere cerebral edema.   Evolving small posterior left frontal infarct.     CTH without contrast 9/23/2032  Impression:  Interval development of moderate to large volume extra-axial hyperdense material left sylvian fissure extending to the frontal and temporal sulci compatible with extravasated contrast and subarachnoid hemorrhage in light interval thrombectomy.   No significant mass effect or midline shift.   There is subtle hypoattenuation along the left posterior frontal lobe and insula concerning for edema and infarction.   Clinical correlation, neuro surgical evaluation and close follow-up recommended..     MRI brain without contrast 9/23/2023  Impression:  1. Findings in keeping with recent infarction involving the left frontal cortex and subcortical white matter as well as involving the left centrum semiovale and corona radiata.  No associated macroscopic hemorrhage or mass effect at the present time.   2. Blooming of susceptibility related signal loss corresponding to area of thrombus within the left MCA as seen on earlier same  day CTA.  Additionally, there is serpiginous FLAIR hyperintense signal within the sulci about the left cerebral hemisphere, in keeping with slow and/or disorganized flow related to proximal stenosis/occlusion.       Vessel Imaging   IR cerebral angiogram 9/23/2023  Preliminary Interpretation:   1. Left M1 direct aspiration thrombectomy with TICI 2C revascularization  2. Small ledge at the proximal ICA was noted concerning for possible web  3. Otherwise normal cerebral angiogram.  (Please see Imaging report for full details)    CTA stroke multiphase 9/23/2023  Impression:  1. Abrupt truncation of the proximal superior division M2.  There is intermittent thready reconstitution distally, with multifocal additional short segment high-grade stenoses/focal occlusions of M2 branches within the sylvian fissure.   2. No evidence of acute intracranial hemorrhage, mass effect, midline shift.  No definite early large territory infarct signs by CT.   3. Additional details as above.       Cardiac Imaging   TTE with bubble 9/24/2023    Left Ventricle: The left ventricle is normal in size. Ventricular mass is normal. Normal wall thickness. Normal wall motion. There is normal systolic function with a visually estimated ejection fraction of 60 - 65%.    Left Atrium: Left atrium is mildly dilated. Agitated saline study of the atrial septum is negative, suggesting absence of intracardiac shunt at the atrial level. No patent foramen ovale.    Right Ventricle: Normal right ventricular cavity size. Wall thickness is normal. Right ventricle wall motion  is normal. Systolic function is normal.    Tricuspid Valve: There is mild regurgitation.    Pulmonary Artery: No pulmonary hypertension. The estimated pulmonary artery systolic pressure is 31 mmHg.    IVC/SVC: Normal venous pressure at 3 mmHg.           JIMENA Rojas  [facility]  Department of Vascular Neurology   [facility] - Neuro Critical Care

## 2023-09-25 NOTE — CONSULTS
Inpatient consult to Physical Medicine Rehab  Consult performed by: Zully Richardson NP  Consult ordered by: Ita Mckenzie PA-C      Consult received.  Reviewed patient history and current admission.  PM&R following. Will follow up with pt once medically stable and able to participate with therapies.    Zully Richardson NP  Physical Medicine & Rehabilitation   09/25/2023

## 2023-09-25 NOTE — ASSESSMENT & PLAN NOTE
43 y.o. female presents with left MCA CVA s/p thrombectomy. No TNK. TICI 2c. Initial NIH 3. NIH on admission 1.     Stable for discharge per Vascular Neurology    - Initial CTA shows L M1 LVO  - MRI Brain W/O Contrast obtained in ED showing findings in keeping with recent infarction involving the left frontal cortex and subcortical white matter as well as involving the left centrum semiovale and corona radiata.     - Post-IR CTH with small amount of contrast extravasation  - q1 neuro checks, vitals, I/Os  - Utox, UA  - EKG, Echo, and CXR pending  - A1c, TSH, and lipid panel pending  - aPTT, PT/INR pending  - CBC, CMP, Mag, and phos daily  - SBP goal < 140  - PRN labetalol and hydralazine  - Statin   - ASA and SQH

## 2023-09-25 NOTE — ASSESSMENT & PLAN NOTE
43F with no significant medical history that presented with acute onset of aphasia and confusion upon waking up. LKN ~5.5hrs PTA. Some improvement in aphasia on presentation, however she remained confused. Initial NIHSS 1 for aphasia. While in ED patient developed R facial droop with difficulty swallowing, on re-eval NIHSS now 3. CTA concerning for L M2 occlusion, MRI brain obtained which confirmed acute L MCA territory infarct. Neuro IR consulted and patient taken emergently for DSA, now s/p L M1 thrombectomy with TICI 2c reperfusion. Post-IR CTH with contrast extravasation, stable on repeat CTH. Echo with bubble with normal EF, mild LAE, and negative for PFO. Suspect etiology of stroke to be ESUS at this time.    DAPT x 30 days + statin for stroke prevention. 30d cardiac event monitor ordered at discharge for further evaluation, also will need hypercoag workup which will be ordered at  follow up in 4-6 weeks.      Antithrombotics for secondary stroke prevention: Antiplatelets: Aspirin: 81 mg daily  Clopidogrel: 75 mg daily for DAPT x 30 days, followed by ASA monotherapy    Statins for secondary stroke prevention and hyperlipidemia, if present:   Statins: Atorvastatin- 40 mg daily    Aggressive risk factor modification: Diet, Exercise     Rehab efforts: PT/OT/SLP evals complete, recs for discharge home with no needs    Diagnostics ordered/pending: None     VTE prophylaxis: Heparin 5000 units SQ every 8 hours  Mechanical prophylaxis: Place SCDs    BP parameters: Infarct: SBP goal <180

## 2023-09-25 NOTE — PLAN OF CARE
09/25/23 1250   Post-Acute Status   Post-Acute Authorization Other   Other Status No Post-Acute Service Needs   Discharge Delays (!) Payor Issues  (pending Medicaid)   Discharge Plan   Discharge Plan A Home;Home with family   Discharge Plan B Home;Home with family     Dalila Patrick LCSW  Neurocritical Care   [facility]  [id number]

## 2023-09-25 NOTE — SUBJECTIVE & OBJECTIVE
Neurologic Chief Complaint: L MCA stroke    Subjective:     Interval History: Patient is seen for follow-up neurological assessment and treatment recommendations:   NAEO. Yesterday afternoon patient had onset of L eye blurry vision associated with HA, repeat imaging stable and ESR WNL. Currently symptoms completely resolved and exam nonfocal. Echo with mild LAE and negative for shunt. PT/OT dispo recs for home with no further therapy needs. Stable for discharge from stroke standpoint, 30d cardiac event monitor ordered at discharge.     HPI, Past Medical, Family, and Social History remains the same as documented in the initial encounter.     Review of Systems   Constitutional:  Negative for fatigue.   HENT:  Negative for drooling and trouble swallowing.    Eyes:  Visual disturbance: L eye blurry vision, resolved.   Respiratory:  Negative for choking.    Cardiovascular:  Negative for palpitations.   Gastrointestinal:  Negative for nausea and vomiting.   Musculoskeletal:  Negative for neck stiffness.   Skin:  Negative for color change.   Neurological:  Negative for speech difficulty and weakness. Numbness: resolved. Headaches: resolved.  Psychiatric/Behavioral:  Negative for confusion.    All other systems reviewed and are negative.    Scheduled Meds:   aspirin  81 mg Oral Daily    atorvastatin  40 mg Oral Daily    gabapentin  600 mg Oral TID    heparin (porcine)  5,000 Units Subcutaneous Q8H    methocarbamoL  750 mg Oral QID    metoclopramide HCl  10 mg Intravenous Once    mupirocin   Nasal BID    senna-docusate 8.6-50 mg  1 tablet Oral BID     Continuous Infusions:  PRN Meds:acetaminophen, hydrALAZINE, hydrOXYzine HCL, labetalol, magnesium oxide, magnesium oxide, metoclopramide HCl, ondansetron, oxyCODONE, potassium bicarbonate, potassium bicarbonate, potassium bicarbonate, potassium, sodium phosphates, potassium, sodium phosphates, potassium, sodium phosphates, promethazine (PHENERGAN) 25 mg in dextrose 5 % (D5W) 50  mL IVPB    Objective:     Vital Signs (Most Recent):  Temp: 98.6 °F (37 °C) (09/25/23 1102)  Pulse: 81 (09/25/23 1202)  Resp: 16 (09/25/23 0722)  BP: 105/68 (09/25/23 1202)  SpO2: 98 % (09/25/23 1202)  BP Location: Right arm    Vital Signs Range (Last 24H):  Temp:  [98.6 °F (37 °C)-100.3 °F (37.9 °C)]   Pulse:  [75-91]   Resp:  [14-16]   BP: ()/(50-68)   SpO2:  [96 %-100 %]   BP Location: Right arm       Physical Exam  Vitals and nursing note reviewed.   Constitutional:       General: She is not in acute distress.  HENT:      Head: Normocephalic.      Nose: Nose normal.      Mouth/Throat:      Pharynx: No oropharyngeal exudate or posterior oropharyngeal erythema.   Eyes:      Extraocular Movements: Extraocular movements intact.      Conjunctiva/sclera: Conjunctivae normal.   Cardiovascular:      Rate and Rhythm: Normal rate.   Pulmonary:      Effort: Pulmonary effort is normal. No respiratory distress.   Abdominal:      General: There is no distension.   Musculoskeletal:         General: No deformity or signs of injury.      Cervical back: Normal range of motion. No rigidity.   Skin:     General: Skin is warm and dry.   Neurological:      General: No focal deficit present.      Mental Status: She is alert and oriented to person, place, and time.      Cranial Nerves: No dysarthria or facial asymmetry.      Sensory: No sensory deficit.      Motor: No weakness, tremor or seizure activity.      Coordination: Coordination is intact.   Psychiatric:         Attention and Perception: Attention normal.         Behavior: Behavior normal. Behavior is cooperative.              Neurological Exam:   LOC: alert  Attention Span: Good   Language: No aphasia  Articulation: No dysarthria  Orientation: Person, Place, Time   Visual Fields: Full  EOM (CN III, IV, VI): Full/intact  Facial Movement (CN VII): Symmetric facial expression    Motor: Arm left  Normal 5/5  Leg left  Normal 5/5  Arm right  Normal 5/5  Leg right Normal  5/5  Sensation: Intact to light touch    Laboratory:  CMP:   Recent Labs   Lab 09/25/23  0304   CALCIUM 8.9   ALBUMIN 3.2*   PROT 5.8*      K 4.0   CO2 23   *   BUN 11   CREATININE 0.7   ALKPHOS 41*   ALT 12   AST 13   BILITOT 0.1       CBC:   Recent Labs   Lab 09/25/23  0304   WBC 7.36   RBC 3.52*   HGB 11.2*   HCT 34.7*      MCV 99*   MCH 31.8*   MCHC 32.3       Lipid Panel:   Recent Labs   Lab 09/23/23  0950   CHOL 277*   LDLCALC 200.0*   HDL 55   TRIG 110       Coagulation:   Recent Labs   Lab 09/23/23  0957   INR 1.1       Hgb A1C:   Recent Labs   Lab 09/24/23  0441   HGBA1C 4.7       TSH:   Recent Labs   Lab 09/23/23  0950   TSH 1.666         Diagnostic Results     Brain Imaging   CTH without contrast 9/24/2023  Impression:  No acute detrimental change relative to most recent head CT 09/23/2023, 16:18 hours.   Similar appearance of left-sided predominant subarachnoid hemorrhage and left hemisphere cerebral edema.   Evolving small posterior left frontal infarct.     CTH without contrast 9/23/2032  Impression:  Interval development of moderate to large volume extra-axial hyperdense material left sylvian fissure extending to the frontal and temporal sulci compatible with extravasated contrast and subarachnoid hemorrhage in light interval thrombectomy.   No significant mass effect or midline shift.   There is subtle hypoattenuation along the left posterior frontal lobe and insula concerning for edema and infarction.   Clinical correlation, neuro surgical evaluation and close follow-up recommended..     MRI brain without contrast 9/23/2023  Impression:  1. Findings in keeping with recent infarction involving the left frontal cortex and subcortical white matter as well as involving the left centrum semiovale and corona radiata.  No associated macroscopic hemorrhage or mass effect at the present time.   2. Blooming of susceptibility related signal loss corresponding to area of thrombus within the  left MCA as seen on earlier same day CTA.  Additionally, there is serpiginous FLAIR hyperintense signal within the sulci about the left cerebral hemisphere, in keeping with slow and/or disorganized flow related to proximal stenosis/occlusion.       Vessel Imaging   IR cerebral angiogram 9/23/2023  Preliminary Interpretation:   1. Left M1 direct aspiration thrombectomy with TICI 2C revascularization  2. Small ledge at the proximal ICA was noted concerning for possible web  3. Otherwise normal cerebral angiogram.  (Please see Imaging report for full details)    CTA stroke multiphase 9/23/2023  Impression:  1. Abrupt truncation of the proximal superior division M2.  There is intermittent thready reconstitution distally, with multifocal additional short segment high-grade stenoses/focal occlusions of M2 branches within the sylvian fissure.   2. No evidence of acute intracranial hemorrhage, mass effect, midline shift.  No definite early large territory infarct signs by CT.   3. Additional details as above.       Cardiac Imaging   TTE with bubble 9/24/2023    Left Ventricle: The left ventricle is normal in size. Ventricular mass is normal. Normal wall thickness. Normal wall motion. There is normal systolic function with a visually estimated ejection fraction of 60 - 65%.    Left Atrium: Left atrium is mildly dilated. Agitated saline study of the atrial septum is negative, suggesting absence of intracardiac shunt at the atrial level. No patent foramen ovale.    Right Ventricle: Normal right ventricular cavity size. Wall thickness is normal. Right ventricle wall motion  is normal. Systolic function is normal.    Tricuspid Valve: There is mild regurgitation.    Pulmonary Artery: No pulmonary hypertension. The estimated pulmonary artery systolic pressure is 31 mmHg.    IVC/SVC: Normal venous pressure at 3 mmHg.

## 2023-09-25 NOTE — PLAN OF CARE
"[facility] Care Plan    POC reviewed with Rhonda Ahuja and family at 0550. Pt verbalized understanding. Questions and concerns addressed. No acute events overnight. Pt progressing toward goals. Will continue to monitor. See below and flowsheets for full assessment and VS info.     -NAEON        Is this a stroke patient? yes- Stroke booklet reviewed with patient and family, risk factors identified for patient and stroke booklet remains at bedside for ongoing education.     Neuro:  Gwendolyn Coma Scale  Best Eye Response: 3-->(E3) to speech  Best Motor Response: 6-->(M6) obeys commands  Best Verbal Response: 5-->(V5) oriented  Gwendolyn Coma Scale Score: 14  Assessment Qualifiers: patient not sedated/intubated, no eye obstruction present  Pupil PERRLA: yes     24hr Temp:  [98 °F (36.7 °C)-98.8 °F (37.1 °C)]     CV:   Rhythm: normal sinus rhythm  BP goals:   SBP < 140  MAP > 65    Resp:           Plan: N/A    GI/:     Diet/Nutrition Received: regular  Last Bowel Movement: 09/23/23  Voiding Characteristics: voids spontaneously without difficulty    Intake/Output Summary (Last 24 hours) at 9/25/2023 0550  Last data filed at 9/24/2023 2238  Gross per 24 hour   Intake --   Output 800 ml   Net -800 ml     Unmeasured Output  Urine Occurrence: 1  Stool Occurrence: 0    Labs/Accuchecks:  Recent Labs   Lab 09/25/23  0304   WBC 7.36   RBC 3.52*   HGB 11.2*   HCT 34.7*         Recent Labs   Lab 09/25/23  0304      K 4.0   CO2 23   *   BUN 11   CREATININE 0.7   ALKPHOS 41*   ALT 12   AST 13   BILITOT 0.1      Recent Labs   Lab 09/23/23  0957   INR 1.1    No results for input(s): "CPK", "CPKMB", "TROPONINI", "MB" in the last 168 hours.    Electrolytes: N/A - electrolytes WDL  Accuchecks: none    Gtts:      LDA/Wounds:  Lines/Drains/Airways       Peripheral Intravenous Line  Duration                  Peripheral IV - Single Lumen 20 G Posterior;Right Hand -- days         Peripheral IV - Single Lumen 09/23/23 0952 20 " G Left Antecubital 1 day                  Wounds: No  Wound care consulted: No

## 2023-09-25 NOTE — PLAN OF CARE
[facility] - Neuro Critical Care  Discharge Final Note    Primary Care Provider: No, Primary Doctor    Expected Discharge Date: 9/25/2023    Patient discharged to home with no post acute needs per MD.     Final Discharge Note (most recent)       Final Note - 09/25/23 1627          Final Note    Assessment Type Final Discharge Note     Anticipated Discharge Disposition Home or Self Care     What phone number can be called within the next 1-3 days to see how you are doing after discharge? [phone]     Hospital Resources/Appts/Education Provided Appointments scheduled and added to AVS                     Important Message from Medicare             Contact Info       [facility]ULAR [facility]UROLOG[facility]   Specialty: Vascular Neurology    [address]  [facility] [address]   Phone: [phone]       Next Steps: Follow up in 1 month(s)    Instructions: Hospital follow up.    Rm Simpson MD   Specialty: Family Medicine    [address], [address] [address]   Phone: [phone]       Next Steps: Go on 10/2/2023    Instructions: Hospital follow up at 10 am          Genia Post RN, CCRN-K, [facility]  Neuro-Critical Care   X [phone]

## 2023-09-26 ENCOUNTER — PATIENT MESSAGE (OUTPATIENT)
Dept: FAMILY MEDICINE | Facility: HOSPITAL | Age: 43
End: 2023-09-26
Payer: MEDICAID

## 2023-09-27 ENCOUNTER — TELEPHONE (OUTPATIENT)
Dept: NEUROLOGY | Facility: HOSPITAL | Age: 43
End: 2023-09-27
Payer: MEDICAID

## 2023-09-27 ENCOUNTER — TELEPHONE (OUTPATIENT)
Dept: FAMILY MEDICINE | Facility: HOSPITAL | Age: 43
End: 2023-09-27
Payer: MEDICAID

## 2023-09-27 NOTE — TELEPHONE ENCOUNTER
Return phone call to this patient who had a stroke and was discharged on Monday.  She stopped taking her narcotics in the ICU at her own request, but is finding the pain unbearable.    She states the neurologist informed her she would have headaches possibly for the next 6 months.  Denies any other problems.   Recommended she return to the ED for evaluation and pain control.      ----- Message from Karol Stein sent at 9/27/2023  1:42 PM CDT -----  Patient called stated she sent a message in the portal patient call back [phone]    I am having pretty serious headaches, specifically a lot of pain on my left side. I was not prescribed anything for pain upon leaving the ICU. Would it be possible to receive a prescription for a pain medication now?     Also, am I scheduled to receive the cardiac event monitor at my 10/2 appt or am I supposed to pick this up sooner?

## 2023-10-02 ENCOUNTER — OFFICE VISIT (OUTPATIENT)
Dept: FAMILY MEDICINE | Facility: HOSPITAL | Age: 43
End: 2023-10-02
Payer: MEDICAID

## 2023-10-02 VITALS
HEIGHT: 64 IN | SYSTOLIC BLOOD PRESSURE: 124 MMHG | HEART RATE: 79 BPM | WEIGHT: 143.06 LBS | BODY MASS INDEX: 24.42 KG/M2 | DIASTOLIC BLOOD PRESSURE: 81 MMHG

## 2023-10-02 DIAGNOSIS — Z12.31 ENCOUNTER FOR SCREENING MAMMOGRAM FOR MALIGNANT NEOPLASM OF BREAST: ICD-10-CM

## 2023-10-02 DIAGNOSIS — E78.5 HYPERLIPIDEMIA, UNSPECIFIED HYPERLIPIDEMIA TYPE: ICD-10-CM

## 2023-10-02 DIAGNOSIS — Z11.4 SCREENING FOR HIV (HUMAN IMMUNODEFICIENCY VIRUS): ICD-10-CM

## 2023-10-02 DIAGNOSIS — G43.909 MIGRAINE WITHOUT STATUS MIGRAINOSUS, NOT INTRACTABLE, UNSPECIFIED MIGRAINE TYPE: ICD-10-CM

## 2023-10-02 DIAGNOSIS — I63.412 EMBOLIC STROKE INVOLVING LEFT MIDDLE CEREBRAL ARTERY: Primary | ICD-10-CM

## 2023-10-02 DIAGNOSIS — Z11.59 ENCOUNTER FOR HEPATITIS C SCREENING TEST FOR LOW RISK PATIENT: ICD-10-CM

## 2023-10-02 PROCEDURE — 99214 OFFICE O/P EST MOD 30 MIN: CPT | Performed by: STUDENT IN AN ORGANIZED HEALTH CARE EDUCATION/TRAINING PROGRAM

## 2023-10-02 NOTE — PROGRESS NOTES
History & Physical  [facility]      SUBJECTIVE:     Rhonda Ahuja is a 43 y.o. year old female with PMHx of Recent CVA who presents to to establish care:    CC: Hospital f/u    CVA of L MCA:  - Patient had a recent L MCA CVA.  - Initial symptoms including aphasia which has resolved.  - However, she reports getting severe headaches, she reports from the thrombectomy.  - CTA not indicating any type of vessel disease  - Echo did not reveal PFO  - Patient had been advised to take ASA, Plavix, and Atorvastatin.  - Patient is resistant to take these meds, especially statin (of note last ).  - However, after hearing risks, patient amenable to start taking statin.  - No obvious family history of clotting disorders or strokes  - Patient follows a high fat ketogenic diet, although she reports that she has significantly reduced her intake of saturated fats.  - Inquiring about the cardiac event monitor that had ordered at discharge.  - Scheduled to f/u with vascular surgery next month.    Woman's Health:  - Reports was trying to get pregnant (1 male partner), however is currently waiting because of her recent CVA.  - She is currently on on any Contraception.  - Patient follows OBGYN.  - Reports needs to call to re-schedule mammogram.        Patient Active Problem List    Diagnosis Date Noted    HLD (hyperlipidemia) 2023    Embolic stroke involving left middle cerebral artery 2023        (Not in a hospital admission)    Review of patient's allergies indicates:  No Known Allergies     Past Medical History:   Diagnosis Date    HLD (hyperlipidemia) 2023      Past Surgical History:   Procedure Laterality Date    BREAST BIOPSY Right     excisional benign    BUNIONECTOMY       SECTION      x 2    TONSILLECTOMY        Family History   Problem Relation Age of Onset    Hypertension Mother     Diabetes Mother     Coronary artery disease Father     No Known Problems Sister     No Known  "Problems Brother     Breast cancer Maternal Grandmother     Colon cancer Neg Hx     Ovarian cancer Neg Hx       Social History     Tobacco Use    Smoking status: Former    Smokeless tobacco: Never   Substance Use Topics    Alcohol use: Not Currently      Review of Systems   Constitutional:        As per HPI, otherwise negative        OBJECTIVE:     Vitals:    10/02/23 1005   BP: 124/81   Pulse: 79   Weight: 64.9 kg (143 lb 1.3 oz)   Height: 5' 4" (1.626 m)     Estimated body mass index is 24.56 kg/m² as calculated from the following:    Height as of this encounter: 5' 4" (1.626 m).    Weight as of this encounter: 64.9 kg (143 lb 1.3 oz).     Physical Exam  Constitutional:       Appearance: Normal appearance. She is normal weight.   HENT:      Head: Normocephalic and atraumatic.      Right Ear: External ear normal.      Left Ear: External ear normal.      Mouth/Throat:      Mouth: Mucous membranes are moist.      Pharynx: Oropharynx is clear.   Eyes:      Extraocular Movements: Extraocular movements intact.      Conjunctiva/sclera: Conjunctivae normal.      Pupils: Pupils are equal, round, and reactive to light.   Cardiovascular:      Rate and Rhythm: Normal rate and regular rhythm.      Pulses: Normal pulses.      Heart sounds: Normal heart sounds.   Pulmonary:      Effort: Pulmonary effort is normal.      Breath sounds: Normal breath sounds.   Abdominal:      General: Abdomen is flat. Bowel sounds are normal.      Tenderness: There is no abdominal tenderness.   Musculoskeletal:      Cervical back: Normal range of motion.      Right lower leg: No edema.      Left lower leg: No edema.   Skin:     Capillary Refill: Capillary refill takes less than 2 seconds.   Neurological:      General: No focal deficit present.      Mental Status: She is alert and oriented to person, place, and time. Mental status is at baseline.   Psychiatric:         Mood and Affect: Mood normal.         Behavior: Behavior normal.         Thought " Content: Thought content normal.         Labs:    Lab Results   Component Value Date    WBC 7.36 09/25/2023    HGB 11.2 (L) 09/25/2023    HCT 34.7 (L) 09/25/2023    MCV 99 (H) 09/25/2023     09/25/2023           CMP  Sodium   Date Value Ref Range Status   09/25/2023 140 136 - 145 mmol/L Final     Potassium   Date Value Ref Range Status   09/25/2023 4.0 3.5 - 5.1 mmol/L Final     Chloride   Date Value Ref Range Status   09/25/2023 111 (H) 95 - 110 mmol/L Final     CO2   Date Value Ref Range Status   09/25/2023 23 23 - 29 mmol/L Final     Glucose   Date Value Ref Range Status   09/25/2023 105 70 - 110 mg/dL Final     BUN   Date Value Ref Range Status   09/25/2023 11 6 - 20 mg/dL Final     Creatinine   Date Value Ref Range Status   09/25/2023 0.7 0.5 - 1.4 mg/dL Final     Calcium   Date Value Ref Range Status   09/25/2023 8.9 8.7 - 10.5 mg/dL Final     Total Protein   Date Value Ref Range Status   09/25/2023 5.8 (L) 6.0 - 8.4 g/dL Final     Albumin   Date Value Ref Range Status   09/25/2023 3.2 (L) 3.5 - 5.2 g/dL Final     Total Bilirubin   Date Value Ref Range Status   09/25/2023 0.1 0.1 - 1.0 mg/dL Final     Comment:     For infants and newborns, interpretation of results should be based  on gestational age, weight and in agreement with clinical  observations.    Premature Infant recommended reference ranges:  Up to 24 hours.............<8.0 mg/dL  Up to 48 hours............<12.0 mg/dL  3-5 days..................<15.0 mg/dL  6-29 days.................<15.0 mg/dL       Alkaline Phosphatase   Date Value Ref Range Status   09/25/2023 41 (L) 55 - 135 U/L Final     AST   Date Value Ref Range Status   09/25/2023 13 10 - 40 U/L Final     ALT   Date Value Ref Range Status   09/25/2023 12 10 - 44 U/L Final     Anion Gap   Date Value Ref Range Status   09/25/2023 6 (L) 8 - 16 mmol/L Final     eGFR   Date Value Ref Range Status   09/25/2023 >60.0 >60 mL/min/1.73 m^2 Final       Lab Results   Component Value Date    TSH  1.666 09/23/2023       Lab Results   Component Value Date    HGBA1C 4.7 09/24/2023       The ASCVD Risk score (Mariana OLSON, et al., 2019) failed to calculate for the following reasons:    The patient has a prior MI or stroke diagnosis    CrCl cannot be calculated (Patient's most recent lab result is older than the maximum 7 days allowed.).      ASSESSMENT/PLAN:     Embolic stroke involving left middle cerebral artery  Encounter orders: CBC Auto Differential; Protime-INR; APTT; Cardiolipin antibody, IgA; BETA-2 GLYCOPROTEIN ANTIBODIES; FACTOR 5 LEIDEN; Cardiac event monitor; Future  Assessment: The patient had a recent L MCA CVA with initial symptoms including aphasia which has resolved. She reports severe headaches post-thrombectomy. There is no clear vessel disease or PFO. The stroke at her young age might be related to high LDL or a familial hypercoagulable disorder.  Plan:  - Continue ASA, Plavix, and Atorvastatin after discussing risks and benefits.  - Investigate potential hypercoagulable disorder.  - Monitor cardiac events as ordered.  - Follow-up with vascular surgery.    Hyperlipidemia, unspecified hyperlipidemia type  Encounter order: LIPOPROTEIN A (LPA); Future  Assessment: The patient's high LDL (200) in the setting of a ketogenic diet is likely contributing to her hyperlipidemia, and potentially her recent CVA.  Plan:  - Start statin therapy after discussing risks and benefits.  - Encourage continued reduction in saturated fat intake.  - Regular lipid panel monitoring.    Migraine without status migrainosus, not intractable, unspecified migraine type  Assessment: The patient reports severe headaches following her thrombectomy, which could be post-procedure migraines or related to her recent CVA.  Plan:  - Monitor symptoms and adjust treatment as needed.  - Provide education on migraine triggers and management.    Encounter for screening mammogram for malignant neoplasm of breast  Assessment: The patient  needs to reschedule her mammogram, which is an important preventive measure for breast cancer.  Plan:  - Encourage patient to reschedule mammogram.  - Provide education on the importance of regular breast cancer screening.    Screening for HIV (human immunodeficiency virus)  -     HIV 1/2 Ag/Ab (4th Gen); Future; Expected date: 10/02/2023    Encounter for hepatitis C screening test for low risk patient  -     Hepatitis C Antibody; Future; Expected date: 10/02/2023       Patient instructed to receive or provide proof of all deficient vaccines in chart - patient verbalized understanding      In regards to A&P, patient verbalized understanding and agreeable to plan      Follow up: 2 weeks labs f/u      Case discussed with staff: Dr. Reilly      This note was partially created using [employer] Voice Recognition software. Typographical and content errors may occur with this process. While efforts are made to detect and correct such errors, in some cases errors will persist. For this reason, wording in this document should be considered in the proper context and not strictly verbatim.    The following information is provided to all patients.  This information is to help you find resources for any of the problems found today that may be affecting your health:                Living healthy guide: www.[facility].[place].gov       Understanding Diabetes: www.diabetes.org       Eating healthy: www.cdc.gov/healthyweight      CDC home safety checklist: www.cdc.gov/steadi/patient.html      Agency on Aging: www.goea.[place].gov       Alcoholics anonymous (AA): www.aa.org      Physical Activity: www.lubna.nih.gov/va5jxhu       Tobacco use: www.quitwithusla.org       Rm iSmpson MD  [facility] Family Medicine, PGY-3  10/02/2023

## 2023-10-05 ENCOUNTER — TELEPHONE (OUTPATIENT)
Dept: CARDIOLOGY | Facility: HOSPITAL | Age: 43
End: 2023-10-05
Payer: MEDICAID

## 2023-10-05 NOTE — TELEPHONE ENCOUNTER
Contacted Ms. Ahuja to offer next available appointment. Appointment accepted for tomorrow at 1 pm.

## 2023-10-06 ENCOUNTER — CLINICAL SUPPORT (OUTPATIENT)
Dept: CARDIOLOGY | Facility: HOSPITAL | Age: 43
End: 2023-10-06
Attending: STUDENT IN AN ORGANIZED HEALTH CARE EDUCATION/TRAINING PROGRAM
Payer: MEDICAID

## 2023-10-06 DIAGNOSIS — I63.412 EMBOLIC STROKE INVOLVING LEFT MIDDLE CEREBRAL ARTERY: ICD-10-CM

## 2023-10-06 PROCEDURE — 93272 ECG/REVIEW INTERPRET ONLY: CPT | Mod: ,,, | Performed by: STUDENT IN AN ORGANIZED HEALTH CARE EDUCATION/TRAINING PROGRAM

## 2023-10-06 PROCEDURE — 93272 CARDIAC EVENT MONITOR (CUPID ONLY): ICD-10-PCS | Mod: ,,, | Performed by: STUDENT IN AN ORGANIZED HEALTH CARE EDUCATION/TRAINING PROGRAM

## 2023-10-06 PROCEDURE — 93270 REMOTE 30 DAY ECG REV/REPORT: CPT

## 2023-10-06 PROCEDURE — 93271 ECG/MONITORING AND ANALYSIS: CPT

## 2023-10-09 NOTE — PROGRESS NOTES
I assume primary medical responsibility for this patient. I have reviewed the history, physical, and assessement & treatment plan with the resident and agree that the care is reasonable and necessary. This service has been performed by a resident with the presence of a teaching physician under the primary care exception. If necessary, an addendum of additional findings or evaluation beyond the resident documentation will be noted below.       Gem Reilly MD    [facility]

## 2023-10-16 ENCOUNTER — OFFICE VISIT (OUTPATIENT)
Dept: FAMILY MEDICINE | Facility: HOSPITAL | Age: 43
End: 2023-10-16
Payer: MEDICAID

## 2023-10-16 VITALS
HEIGHT: 64 IN | BODY MASS INDEX: 24.69 KG/M2 | WEIGHT: 144.63 LBS | HEART RATE: 67 BPM | SYSTOLIC BLOOD PRESSURE: 123 MMHG | DIASTOLIC BLOOD PRESSURE: 72 MMHG

## 2023-10-16 DIAGNOSIS — I63.412 EMBOLIC STROKE INVOLVING LEFT MIDDLE CEREBRAL ARTERY: Primary | ICD-10-CM

## 2023-10-16 DIAGNOSIS — E78.5 HYPERLIPIDEMIA, UNSPECIFIED HYPERLIPIDEMIA TYPE: ICD-10-CM

## 2023-10-16 DIAGNOSIS — R47.01 APHASIA: ICD-10-CM

## 2023-10-16 DIAGNOSIS — D68.9 BLOOD CLOTTING DISORDER: ICD-10-CM

## 2023-10-16 DIAGNOSIS — Z12.31 ENCOUNTER FOR SCREENING MAMMOGRAM FOR MALIGNANT NEOPLASM OF BREAST: ICD-10-CM

## 2023-10-16 PROCEDURE — 99214 OFFICE O/P EST MOD 30 MIN: CPT | Performed by: STUDENT IN AN ORGANIZED HEALTH CARE EDUCATION/TRAINING PROGRAM

## 2023-10-16 NOTE — PROGRESS NOTES
Progress Note  [facility]    Subjective:     Rhonda Ahuja is a 43 y.o. year old female with PMHx of Recent CVA who presents to clinic for:    CC: Hospital f/u    CVA of L MCA:  - Patient had a recent L MCA CVA.  - Symptoms have largely resolved, however she is reporting aphasia which has largely persisted.  - She reports that she sometimes has issues with word choice, but otherwise is stable.  - SLP had  - S/p Thrombectomy.  - CTA not indicating any type of vessel disease  - Echo did not reveal PFO  - Patient initially resistant to Statin and blood thinners, but after reviewing labs, patient amenable to prescribed medications.  - No obvious family history of clotting disorders or strokes, and w/u relatively unremarkable.  - However, B2 Glycoprotein IgM pos, indicating possible clotting disorder.  - Patient follows a high fat ketogenic diet, although she reports that she has significantly reduced her intake of saturated fats.  - Has been using Holter monitor.  - Scheduled to f/u with vascular surgery this week.    Woman's Health:  - Reports was trying to get pregnant (1 male partner), however is currently waiting because of her recent CVA.  - Patient follows OBGYN.  - Needs a new mammogram referral.      Patient Active Problem List    Diagnosis Date Noted    HLD (hyperlipidemia) 2023    Embolic stroke involving left middle cerebral artery 2023        (Not in a hospital admission)    Review of patient's allergies indicates:  No Known Allergies     Past Medical History:   Diagnosis Date    HLD (hyperlipidemia) 2023      Past Surgical History:   Procedure Laterality Date    BREAST BIOPSY Right     excisional benign    BUNIONECTOMY       SECTION      x 2    TONSILLECTOMY        Family History   Problem Relation Age of Onset    Hypertension Mother     Diabetes Mother     Coronary artery disease Father     No Known Problems Sister     No Known Problems Brother     Breast cancer  "Maternal Grandmother     Colon cancer Neg Hx     Ovarian cancer Neg Hx       Social History     Tobacco Use    Smoking status: Former    Smokeless tobacco: Never   Substance Use Topics    Alcohol use: Not Currently      Review of Systems   Constitutional:        As per HPI, otherwise negative        Objective:     Vitals:    10/16/23 1055   BP: 123/72   Pulse: 67   Weight: 65.6 kg (144 lb 10 oz)   Height: 5' 4" (1.626 m)     Estimated body mass index is 24.82 kg/m² as calculated from the following:    Height as of this encounter: 5' 4" (1.626 m).    Weight as of this encounter: 65.6 kg (144 lb 10 oz).     Physical Exam  Constitutional:       Appearance: Normal appearance. She is normal weight.   HENT:      Head: Normocephalic and atraumatic.      Right Ear: External ear normal.      Left Ear: External ear normal.      Mouth/Throat:      Mouth: Mucous membranes are moist.      Pharynx: Oropharynx is clear.   Eyes:      Extraocular Movements: Extraocular movements intact.      Conjunctiva/sclera: Conjunctivae normal.      Pupils: Pupils are equal, round, and reactive to light.   Cardiovascular:      Rate and Rhythm: Normal rate and regular rhythm.      Pulses: Normal pulses.      Heart sounds: Normal heart sounds.   Pulmonary:      Effort: Pulmonary effort is normal.      Breath sounds: Normal breath sounds.   Abdominal:      General: Abdomen is flat. Bowel sounds are normal.      Tenderness: There is no abdominal tenderness.   Musculoskeletal:      Cervical back: Normal range of motion.      Right lower leg: No edema.      Left lower leg: No edema.   Skin:     Capillary Refill: Capillary refill takes less than 2 seconds.   Neurological:      General: No focal deficit present.      Mental Status: She is alert and oriented to person, place, and time. Mental status is at baseline.   Psychiatric:         Mood and Affect: Mood normal.         Behavior: Behavior normal.         Thought Content: Thought content normal. "         Assessment/Plan:     Embolic stroke involving left middle cerebral artery (I63.412):  - Assessment: The patient had a recent embolic stroke involving the left middle cerebral artery. Post-thrombectomy, her symptoms have largely resolved, except for persistent aphasia. The patient's elevated Lipoprotein a and positive B2 Glycoprotein IgM suggest a possible underlying clotting disorder. The patient's agreement to continue with her prescribed medications shows understanding and compliance with her treatment plan.  - Plan:  - Continue with prescribed medications (Statin and blood thinners).  - Monitor the patient's aphasia and consider further interventions if no improvement.  - Follow up with vascular surgery as scheduled.  - Continue monitoring the patient's condition and adjust treatment as necessary.    Blood clotting disorder (D68.9):  - Encounter Orders: Ambulatory referral/consult to Hematology / Oncology; Future; Expected date: 10/23/2023  - Assessment: The patient has a positive B2 Glycoprotein IgM, suggesting a possible antiphospholipid syndrome or another clotting disorder. This may have contributed to the recent embolic stroke. Further evaluation by Hematology/Oncology is necessary to confirm the diagnosis and guide management.  - Plan:  - Proceed with the referral to Hematology / Oncology for further workup.  - Continue with the current management plan until further guidance from the specialist.  - Monitor for any new symptoms or complications related to the clotting disorder.    Hyperlipidemia, unspecified hyperlipidemia type (E78.5):  - Assessment: LDL >190. The patient's recent labs indicated an elevated Lipoprotein a, a form of low-density lipoprotein (LDL) cholesterol. This, in conjunction with her high fat diet, increases her risk for atherosclerosis and subsequent cardiovascular events, such as stroke. The patient's recent embolic stroke further underscores the importance of managing this  condition. The patient has agreed to continue with her prescribed Statin medication, which is crucial in managing her hyperlipidemia.  - Plan:  - Continue with prescribed Statin medication to manage hyperlipidemia.  - Encourage the patient to maintain a low-saturated fat diet.  - Regularly monitor lipid levels to assess the effectiveness of the medication and the need for any adjustments.  - Assess patient's adherence to medication and provide further education if needed.  - Regular follow-ups to monitor for any potential side effects of Statin therapy.    Encounter for screening mammogram for malignant neoplasm of breast (Z12.31):  - Encounter Orders: Mammo Digital Screening Bilat w/ Anshu; Future; Expected date: 10/16/2023  - Assessment: The patient requires a new referral for a mammogram screening, as part of routine women's health care. This is especially important given her age and potential risk factors.  - Plan:  - Schedule the mammogram as planned.  - Review the results once available and discuss with the patient.  - Continue with routine mammogram screenings as per guidelines.       Patient instructed to receive or provide proof of all deficient vaccines in chart - patient verbalized understanding      In regards to A&P, patient verbalized understanding and agreeable to plan      Follow-up: 3 months regular f/u      Case discussed with staff: Dr. Buckley      This note was partially created using [employer] Voice Recognition software. Typographical and content errors may occur with this process. While efforts are made to detect and correct such errors, in some cases errors will persist. For this reason, wording in this document should be considered in the proper context and not strictly verbatim.    The following information is provided to all patients.  This information is to help you find resources for any of the problems found today that may be affecting your health:                Living healthy guide:  www.[facility].[place].[facility]       Understanding Diabetes: www.diabetes.org       Eating healthy: www.cdc.gov/healthyweight      CDC home safety checklist: www.cdc.gov/steadi/patient.html      Agency on Aging: www.goea.[place].[facility]       Alcoholics anonymous (AA): www.aa.org      Physical Activity: www.lubna.nih.gov/cc2fysl       Tobacco use: www.quitwithusla.org        Rm Simpson MD  [facility], PGY-3  10/16/2023

## 2023-10-25 ENCOUNTER — OFFICE VISIT (OUTPATIENT)
Dept: NEUROLOGY | Facility: CLINIC | Age: 43
End: 2023-10-25
Payer: MEDICAID

## 2023-10-25 VITALS
WEIGHT: 144.63 LBS | HEART RATE: 71 BPM | SYSTOLIC BLOOD PRESSURE: 119 MMHG | BODY MASS INDEX: 24.69 KG/M2 | HEIGHT: 64 IN | DIASTOLIC BLOOD PRESSURE: 72 MMHG

## 2023-10-25 DIAGNOSIS — E78.5 HYPERLIPIDEMIA, UNSPECIFIED HYPERLIPIDEMIA TYPE: ICD-10-CM

## 2023-10-25 DIAGNOSIS — R47.89 WORD FINDING DIFFICULTY: ICD-10-CM

## 2023-10-25 DIAGNOSIS — Z72.0 TOBACCO USE: ICD-10-CM

## 2023-10-25 DIAGNOSIS — I63.412 EMBOLIC STROKE INVOLVING LEFT MIDDLE CEREBRAL ARTERY: Primary | ICD-10-CM

## 2023-10-25 PROCEDURE — 3074F SYST BP LT 130 MM HG: CPT | Mod: CPTII,,, | Performed by: PSYCHIATRY & NEUROLOGY

## 2023-10-25 PROCEDURE — 1111F PR DISCHARGE MEDS RECONCILED W/ CURRENT OUTPATIENT MED LIST: ICD-10-PCS | Mod: CPTII,,, | Performed by: PSYCHIATRY & NEUROLOGY

## 2023-10-25 PROCEDURE — 3078F PR MOST RECENT DIASTOLIC BLOOD PRESSURE < 80 MM HG: ICD-10-PCS | Mod: CPTII,,, | Performed by: PSYCHIATRY & NEUROLOGY

## 2023-10-25 PROCEDURE — 99215 OFFICE O/P EST HI 40 MIN: CPT | Mod: S$PBB,,, | Performed by: PSYCHIATRY & NEUROLOGY

## 2023-10-25 PROCEDURE — 3008F PR BODY MASS INDEX (BMI) DOCUMENTED: ICD-10-PCS | Mod: CPTII,,, | Performed by: PSYCHIATRY & NEUROLOGY

## 2023-10-25 PROCEDURE — 3074F PR MOST RECENT SYSTOLIC BLOOD PRESSURE < 130 MM HG: ICD-10-PCS | Mod: CPTII,,, | Performed by: PSYCHIATRY & NEUROLOGY

## 2023-10-25 PROCEDURE — 99215 PR OFFICE/OUTPT VISIT, EST, LEVL V, 40-54 MIN: ICD-10-PCS | Mod: S$PBB,,, | Performed by: PSYCHIATRY & NEUROLOGY

## 2023-10-25 PROCEDURE — 3044F PR MOST RECENT HEMOGLOBIN A1C LEVEL <7.0%: ICD-10-PCS | Mod: CPTII,,, | Performed by: PSYCHIATRY & NEUROLOGY

## 2023-10-25 PROCEDURE — 3078F DIAST BP <80 MM HG: CPT | Mod: CPTII,,, | Performed by: PSYCHIATRY & NEUROLOGY

## 2023-10-25 PROCEDURE — 99999 PR PBB SHADOW E&M-EST. PATIENT-LVL IV: CPT | Mod: PBBFAC,,, | Performed by: PSYCHIATRY & NEUROLOGY

## 2023-10-25 PROCEDURE — 99999 PR PBB SHADOW E&M-EST. PATIENT-LVL IV: ICD-10-PCS | Mod: PBBFAC,,, | Performed by: PSYCHIATRY & NEUROLOGY

## 2023-10-25 PROCEDURE — 99214 OFFICE O/P EST MOD 30 MIN: CPT | Mod: PBBFAC | Performed by: PSYCHIATRY & NEUROLOGY

## 2023-10-25 PROCEDURE — 1159F PR MEDICATION LIST DOCUMENTED IN MEDICAL RECORD: ICD-10-PCS | Mod: CPTII,,, | Performed by: PSYCHIATRY & NEUROLOGY

## 2023-10-25 PROCEDURE — 1159F MED LIST DOCD IN RCRD: CPT | Mod: CPTII,,, | Performed by: PSYCHIATRY & NEUROLOGY

## 2023-10-25 PROCEDURE — 3044F HG A1C LEVEL LT 7.0%: CPT | Mod: CPTII,,, | Performed by: PSYCHIATRY & NEUROLOGY

## 2023-10-25 PROCEDURE — 1111F DSCHRG MED/CURRENT MED MERGE: CPT | Mod: CPTII,,, | Performed by: PSYCHIATRY & NEUROLOGY

## 2023-10-25 PROCEDURE — 3008F BODY MASS INDEX DOCD: CPT | Mod: CPTII,,, | Performed by: PSYCHIATRY & NEUROLOGY

## 2023-10-25 NOTE — PATIENT INSTRUCTIONS
- Referral to Cardiology for Transesophageal Echo  - Continue Aspirin 81 mg daily for stroke prevention  - Stop Clopidogrel when you run out of pills.  - Continue atorvastatin 40 mg nightly for cholesterol.  Recheck FASTING cholesterol in 6-8 weeks.  May consider switching meds based on response.  - Drink plenty of water throughout the day.  - Followup with Hematology    Mediterranean Diet Recommendations    Eat primarily plant-based foods, such as fruits and vegetables, whole grains, legumes (beans) and nuts.  Limit refined carbohydrates (white pasta, bread, rice).  Replace butter with healthy fats such as olive oil.  Use herbs and spices instead of salt to flavor foods.  Limit red meat and processed meats to no more than a few times a month.  Avoid sugary sodas, bakery goods, and sweets.  Eat fish and poultry at least twice a week.  Get plenty of exercise (150 minutes per week).    Adopted from Elba swanson al, NEJ, 2018.

## 2023-10-25 NOTE — PROGRESS NOTES
Vascular Neurology  Clinic Note    Reason For Visit (Chief Complaint): L MCA stroke    HPI: 43 y.o. right handed woman with PMH HLD with recent admission for L-MCA stroke.  Here for followup after hospitalization.    Patient presented with aphasia, confusion upon awakening from sleep.  NIHSS 3 for aphasia, facial droop, dysphagia.  CTA showed LM2 occlusiona nd she was taken to IR for M1 thrombectomy.    Reports ongoing occasional word finding issues.  Not doing any SLP.  Headaches have subsided.  Not back at work.  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  Was taking Didex about a year ago.  Did go the ER about a year ago for a possibly panic attack vs heart attack.  No hormones birth control.  Had an early miscarriage when she around 27.  Had 2 subsequent healthy pregnancies.  Has some difficulty with multitasking.  Had an episode of unilateral leg swelling/pain roughly 2-4 weeks prior to the stroke.  No long road trips or plane flights.    Recently saw Hematology: elevated Beta2 IgM and LPA.    Brain Imaging:  CTA H/N 9/23/23:  1. Abrupt truncation of the proximal superior division M2.  There is intermittent thready reconstitution distally, with multifocal additional short segment high-grade stenoses/focal occlusions of M2 branches within the sylvian fissure.  2. No evidence of acute intracranial hemorrhage, mass effect, midline shift.  No definite early large territory infarct signs by CT.    MRI Brain 9/23/23:  1. Findings in keeping with recent infarction involving the left frontal cortex and subcortical white matter as well as involving the left centrum semiovale and corona radiata.  No associated macroscopic hemorrhage or mass effect at the present time.  2. Blooming of susceptibility related signal loss corresponding to area of thrombus within the left MCA as seen on earlier same day CTA.  Additionally, there is serpiginous FLAIR hyperintense signal within the sulci about the left cerebral hemisphere, in keeping  with slow and/or disorganized flow related to proximal stenosis/occlusion.      Cardiac Evaluation:  TTE 9/24/23:  Left Ventricle: The left ventricle is normal in size. Ventricular mass is normal. Normal wall thickness. Normal wall motion. There is normal systolic function with a visually estimated ejection fraction of 60 - 65%.  Left Atrium: Left atrium is mildly dilated. Agitated saline study of the atrial septum is negative, suggesting absence of intracardiac shunt at the atrial level. No patent foramen ovale.        Relevant Labwork:  Recent Labs   Lab 09/23/23  0950 09/24/23  0441   Hemoglobin A1C  --  4.7   LDL Cholesterol 200.0 H  --    HDL 55  --    Triglycerides 110  --    Cholesterol 277 H  --       Latest Reference Range & Units 10/02/23 11:05   Protime 9.0 - 12.5 sec 10.5   INR 0.8 - 1.2  1.0   aPTT 21.0 - 32.0 sec 25.1   ANTICARDIOLIPIN IGA <14.0 APL 1.6   Beta-2 Glyco 1 IgA <7.0 U/mL 1.2   Beta-2 Glyco 1 IgG <7.0 U/mL <0.8   Beta-2 Glyco 1 IgM <7.0 U/mL 48.0 (H)   Factor V Leiden  Negative   (H): Data is abnormally high    Lipoprotein (a)  Collected: 10/02/23 1105   Result status: Final   Resulting lab: [facility]   Reference range: 0 - 30 mg/dL   Value: 69 High        I independently viewed the above imaging studies in addition to reviewing the report.  I reviewed the above labwork.    Review of Systems  Msk: negative for muscle pain  Skin: negative for pruritis  Neuro: +word finding difficulty  All others negative    Past Medical History  Past Medical History:   Diagnosis Date    HLD (hyperlipidemia) 9/24/2023     Family History  Cancer-related family history includes Breast cancer in her maternal grandmother. There is no history of Ovarian cancer.     Social History  Works as a .  Was an off/on smoker until recently.      Medication List with Changes/Refills   Current Medications    ASPIRIN 81 MG CHEW    Chew and swallow 1 tablet (81 mg total) by mouth once daily.    ATORVASTATIN  "(LIPITOR) 40 MG TABLET    Take 1 tablet (40 mg total) by mouth once daily.    CLOPIDOGREL (PLAVIX) 75 MG TABLET    Take 1 tablet (75 mg total) by mouth once daily.    HYDROXYZINE HCL (ATARAX) 25 MG TABLET    Take 1 tablet (25 mg total) by mouth 3 (three) times daily as needed for Anxiety.       EXAM  Vital Signs  Pulse: 71  BP: 119/72  Pain Score: 0-No pain  Height and Weight  Height: 5' 4" (162.6 cm)  Weight: 65.6 kg (144 lb 10 oz)  BSA (Calculated - sq m): 1.72 sq meters  BMI (Calculated): 24.8  Weight in (lb) to have BMI = 25: 145.3]    General: well appearing without discomfort   Neck: no carotid bruits  CV: RRR, nL S1&S2  Resp: breathing comfortably, no wheezing  Ext: wwp, no pedal edema    Mental Status: Alert and oriented, normal attention, speech fluent and prosodic, naming and repetition intact, follows multistep embedded commands, no e/o neglect or extinction.  Verbal fluency: 11 F words in 1 min.  Cranial Nerves: PERRL, EOMI, VFF, sensation intact, face symmetric, TUP midline, SCM/trap 5/5.    Motor: Normal bulk and tone, no drift, finger taps symmetric  Strength 5/5 throughout  Sensory: intact light touch bilaterally, intact proprioception bilaterally  Coordination: no ataxia on finger-to-nose or heel-to-shin testing; no truncal ataxia  Gait & Stance: normal  DTR: 2+ symmetric    NIHSS - 0    ___________________  ASSESSMENT & PLAN  43 y.o. right handed woman with PMH HLD with recent admission for L-MCA stroke.  Here for followup after hospitalization.  Doing will with minor cognitive/language deficits.  Etiology ESUS: no PFO but slightly enlarged LA.  Patient interested in pursing ISAIAH which is reasonable.  Will refer to cardiology.    - Referral to Cardiology for Transesophageal Echo  - Continue Aspirin 81 mg daily for stroke prevention  - Stop Clopidogrel after 30d complete.  - Continue atorvastatin 40 mg nightly for cholesterol.  Recheck FASTING cholesterol in 6-8 weeks.  May require PCSK9 inhibitor " (familiar hyperlipidemia?)  - Abstain from all tobacco products.  - Stay well hydrated  - Lower extremity ultrasounds (given report of leg swelling prior to stroke)  - Followup with Hematology for hypercoag testing  - RTC 3 mos    Problem List Items Addressed This Visit          Unprioritized    Embolic stroke involving left middle cerebral artery - Primary    Relevant Orders    CV Ultrasound doppler venous legs bilat    Ambulatory referral/consult to Cardiology    HLD (hyperlipidemia)    Word finding difficulty    Tobacco use       Ita Ramos MD  Vascular Neurology

## 2023-10-28 ENCOUNTER — TELEPHONE (OUTPATIENT)
Dept: FAMILY MEDICINE | Facility: HOSPITAL | Age: 43
End: 2023-10-28
Payer: MEDICAID

## 2023-12-01 PROBLEM — Z72.0 TOBACCO USE: Status: ACTIVE | Noted: 2023-12-01

## 2023-12-01 PROBLEM — R47.89 WORD FINDING DIFFICULTY: Status: ACTIVE | Noted: 2023-12-01

## 2023-12-13 ENCOUNTER — OFFICE VISIT (OUTPATIENT)
Dept: CARDIOLOGY | Facility: CLINIC | Age: 43
End: 2023-12-13
Payer: MEDICAID

## 2023-12-13 VITALS
WEIGHT: 146.69 LBS | HEART RATE: 73 BPM | OXYGEN SATURATION: 99 % | BODY MASS INDEX: 25.18 KG/M2 | SYSTOLIC BLOOD PRESSURE: 117 MMHG | DIASTOLIC BLOOD PRESSURE: 67 MMHG

## 2023-12-13 DIAGNOSIS — I63.412 EMBOLIC STROKE INVOLVING LEFT MIDDLE CEREBRAL ARTERY: ICD-10-CM

## 2023-12-13 DIAGNOSIS — E78.01 FAMILIAL HYPERCHOLESTEROLEMIA: Primary | ICD-10-CM

## 2023-12-13 PROCEDURE — 99205 PR OFFICE/OUTPT VISIT, NEW, LEVL V, 60-74 MIN: ICD-10-PCS | Mod: S$PBB,,, | Performed by: INTERNAL MEDICINE

## 2023-12-13 PROCEDURE — 3044F HG A1C LEVEL LT 7.0%: CPT | Mod: CPTII,,, | Performed by: INTERNAL MEDICINE

## 2023-12-13 PROCEDURE — 1160F PR REVIEW ALL MEDS BY PRESCRIBER/CLIN PHARMACIST DOCUMENTED: ICD-10-PCS | Mod: CPTII,,, | Performed by: INTERNAL MEDICINE

## 2023-12-13 PROCEDURE — 3008F PR BODY MASS INDEX (BMI) DOCUMENTED: ICD-10-PCS | Mod: CPTII,,, | Performed by: INTERNAL MEDICINE

## 2023-12-13 PROCEDURE — 99213 OFFICE O/P EST LOW 20 MIN: CPT | Mod: PBBFAC,PN | Performed by: INTERNAL MEDICINE

## 2023-12-13 PROCEDURE — 1160F RVW MEDS BY RX/DR IN RCRD: CPT | Mod: CPTII,,, | Performed by: INTERNAL MEDICINE

## 2023-12-13 PROCEDURE — 3078F PR MOST RECENT DIASTOLIC BLOOD PRESSURE < 80 MM HG: ICD-10-PCS | Mod: CPTII,,, | Performed by: INTERNAL MEDICINE

## 2023-12-13 PROCEDURE — 3074F SYST BP LT 130 MM HG: CPT | Mod: CPTII,,, | Performed by: INTERNAL MEDICINE

## 2023-12-13 PROCEDURE — 3008F BODY MASS INDEX DOCD: CPT | Mod: CPTII,,, | Performed by: INTERNAL MEDICINE

## 2023-12-13 PROCEDURE — 99205 OFFICE O/P NEW HI 60 MIN: CPT | Mod: S$PBB,,, | Performed by: INTERNAL MEDICINE

## 2023-12-13 PROCEDURE — 99999 PR PBB SHADOW E&M-EST. PATIENT-LVL III: CPT | Mod: PBBFAC,,, | Performed by: INTERNAL MEDICINE

## 2023-12-13 PROCEDURE — 3044F PR MOST RECENT HEMOGLOBIN A1C LEVEL <7.0%: ICD-10-PCS | Mod: CPTII,,, | Performed by: INTERNAL MEDICINE

## 2023-12-13 PROCEDURE — 3074F PR MOST RECENT SYSTOLIC BLOOD PRESSURE < 130 MM HG: ICD-10-PCS | Mod: CPTII,,, | Performed by: INTERNAL MEDICINE

## 2023-12-13 PROCEDURE — 1159F MED LIST DOCD IN RCRD: CPT | Mod: CPTII,,, | Performed by: INTERNAL MEDICINE

## 2023-12-13 PROCEDURE — 1159F PR MEDICATION LIST DOCUMENTED IN MEDICAL RECORD: ICD-10-PCS | Mod: CPTII,,, | Performed by: INTERNAL MEDICINE

## 2023-12-13 PROCEDURE — 3078F DIAST BP <80 MM HG: CPT | Mod: CPTII,,, | Performed by: INTERNAL MEDICINE

## 2023-12-13 PROCEDURE — 99999 PR PBB SHADOW E&M-EST. PATIENT-LVL III: ICD-10-PCS | Mod: PBBFAC,,, | Performed by: INTERNAL MEDICINE

## 2023-12-13 NOTE — PROGRESS NOTES
Subjective:      Patient ID: Rhonda Ahuja is a 43 y.o. female.    Chief Complaint: embolic stroke    HPI:Pt was hospitalized with a stroke 23.  Pt had difficulty speaking.  Pt underwent cerebral artery thrombectomy.  Pt's neurologic deficit has largely resolved.    There is no hx of heart disease.    Pt exercises    Neurologist hs recommended ISAIAH    Review of Systems   Cardiovascular:  Negative for chest pain, claudication, dyspnea on exertion, irregular heartbeat, leg swelling, near-syncope, orthopnea, palpitations and syncope.      Pt went to the ER last 2022 with shortness of breath and dizziness and pressure in her chest which lasted for 20 minutes.  Pt was under a lot of stress at that time and was diagnosed with a panic attack.    No hx of dysphagia or esophagitis.    Pt works at a club as a .    Pt is reluctant to take a statin.  Pt would rather try diet and exercise.      Past Medical History:   Diagnosis Date    HLD (hyperlipidemia) 2023    Stroke         Past Surgical History:   Procedure Laterality Date    BREAST BIOPSY Right     excisional benign    BUNIONECTOMY       SECTION      x 2    TONSILLECTOMY         Family History   Problem Relation Age of Onset    Hypertension Mother     Diabetes Mother     ALS Mother     Heart attack Father     Coronary artery disease Father     No Known Problems Sister     No Known Problems Brother     Breast cancer Maternal Grandmother     Colon cancer Neg Hx     Ovarian cancer Neg Hx        Social History     Socioeconomic History    Marital status: Single   Tobacco Use    Smoking status: Former    Smokeless tobacco: Never   Substance and Sexual Activity    Alcohol use: Not Currently    Drug use: Never    Sexual activity: Yes     Partners: Male     Birth control/protection: Coitus interruptus       Current Outpatient Medications on File Prior to Visit   Medication Sig Dispense Refill    aspirin 81 MG Chew Chew and swallow 1 tablet  (81 mg total) by mouth once daily. 30 tablet 11    atorvastatin (LIPITOR) 40 MG tablet Take 1 tablet (40 mg total) by mouth once daily. (Patient not taking: Reported on 12/13/2023) 90 tablet 3    hydrOXYzine HCL (ATARAX) 25 MG tablet Take 1 tablet (25 mg total) by mouth 3 (three) times daily as needed for Anxiety. (Patient not taking: Reported on 10/2/2023) 15 tablet 0    [DISCONTINUED] clopidogreL (PLAVIX) 75 mg tablet Take 1 tablet (75 mg total) by mouth once daily. 30 tablet 0     No current facility-administered medications on file prior to visit.       Review of patient's allergies indicates:  No Known Allergies  Objective:     Vitals:    12/13/23 1533   BP: 117/67   BP Location: Left arm   Patient Position: Sitting   BP Method: Medium (Automatic)   Pulse: 73   SpO2: 99%   Weight: 66.6 kg (146 lb 11.5 oz)        Physical Exam  Constitutional:       Appearance: She is well-developed.   Eyes:      General: No scleral icterus.  Neck:      Vascular: No carotid bruit or JVD.   Cardiovascular:      Rate and Rhythm: Normal rate and regular rhythm.      Heart sounds: No murmur heard.     No gallop.   Pulmonary:      Breath sounds: Normal breath sounds.   Abdominal:      General: There is no abdominal bruit.      Palpations: Abdomen is soft. There is no shifting dullness, fluid wave, hepatomegaly, splenomegaly, mass or pulsatile mass.      Tenderness: There is no abdominal tenderness.   Musculoskeletal:      Right lower leg: No edema.      Left lower leg: No edema.   Skin:     General: Skin is warm and dry.   Neurological:      Mental Status: She is alert and oriented to person, place, and time.   Psychiatric:         Behavior: Behavior normal.         Thought Content: Thought content normal.         Judgment: Judgment normal.        Lab Visit on 10/02/2023   Component Date Value Ref Range Status    Lipoprotein (a) 10/02/2023 69 (H)  0 - 30 mg/dL Final   Lab Visit on 10/02/2023   Component Date Value Ref Range Status     WBC 10/02/2023 6.20  3.90 - 12.70 K/uL Final    RBC 10/02/2023 4.10  4.00 - 5.40 M/uL Final    Hemoglobin 10/02/2023 13.0  12.0 - 16.0 g/dL Final    Hematocrit 10/02/2023 38.9  37.0 - 48.5 % Final    MCV 10/02/2023 95  82 - 98 fL Final    MCH 10/02/2023 31.7 (H)  27.0 - 31.0 pg Final    MCHC 10/02/2023 33.4  32.0 - 36.0 g/dL Final    RDW 10/02/2023 12.0  11.5 - 14.5 % Final    Platelets 10/02/2023 337  150 - 450 K/uL Final    MPV 10/02/2023 9.8  9.2 - 12.9 fL Final    Immature Granulocytes 10/02/2023 0.3  0.0 - 0.5 % Final    Gran # (ANC) 10/02/2023 3.0  1.8 - 7.7 K/uL Final    Immature Grans (Abs) 10/02/2023 0.02  0.00 - 0.04 K/uL Final    Lymph # 10/02/2023 2.2  1.0 - 4.8 K/uL Final    Mono # 10/02/2023 0.7  0.3 - 1.0 K/uL Final    Eos # 10/02/2023 0.1  0.0 - 0.5 K/uL Final    Baso # 10/02/2023 0.10  0.00 - 0.20 K/uL Final    nRBC 10/02/2023 0  0 /100 WBC Final    Gran % 10/02/2023 47.9  38.0 - 73.0 % Final    Lymph % 10/02/2023 36.0  18.0 - 48.0 % Final    Mono % 10/02/2023 11.9  4.0 - 15.0 % Final    Eosinophil % 10/02/2023 2.3  0.0 - 8.0 % Final    Basophil % 10/02/2023 1.6  0.0 - 1.9 % Final    Differential Method 10/02/2023 Automated   Final    Prothrombin Time 10/02/2023 10.5  9.0 - 12.5 sec Final    INR 10/02/2023 1.0  0.8 - 1.2 Final    aPTT 10/02/2023 25.1  21.0 - 32.0 sec Final    Anticardiolipin IgA 10/02/2023 1.6  <14.0 APL Final    Beta-2 Glyco 1 IgG 10/02/2023 <0.8  <7.0 U/mL Final    Beta-2 Glyco 1 IgM 10/02/2023 48.0 (H)  <7.0 U/mL Final    Beta-2 Glyco 1 IgA 10/02/2023 1.2  <7.0 U/mL Final    Factor V Leiden 10/02/2023 Negative   Final    Hepatitis C Ab 10/02/2023 Non-reactive  Non-reactive Final    HIV 1/2 Ag/Ab 10/02/2023 Non-reactive  Non-reactive Final   No results displayed because visit has over 200 results.      (  Result Image Hyperlink     Show images for LDL - Lipid Panel  Results   LDL - Lipid Panel (Acc# [id number]) (Order [id number])   [email or web address] Results Release    [employer] Status:  Active  Results Release       Contains abnormal data LDL - Lipid Panel  Order: [id number]  Status: Final result     Visible to patient: Yes (seen)     Next appt: 12/18/2023 at 03:00 PM in Hematology and Oncology (Wing Watts MD)     0 Result Notes    1 HM Topic       Component Ref Range & Units 2 mo ago   Cholesterol 120 - 199 mg/dL 277 High     Comment: The National Cholesterol Education Program (NCEP) has set the   following guidelines (reference ranges) for Cholesterol:   Optimal.....................<200 mg/dL   Borderline High.............200-239 mg/dL   High........................> or = 240 mg/dL    Triglycerides 30 - 150 mg/dL 110    Comment: The National Cholesterol Education Program (NCEP) has set the   following guidelines (reference values) for triglycerides:   Normal......................<150 mg/dL   Borderline High.............150-199 mg/dL   High........................200-499 mg/dL    HDL 40 - 75 mg/dL 55    Comment: The National Cholesterol Education Program (NCEP) has set the   following guidelines (reference values) for HDL Cholesterol:   Low...............<40 mg/dL   Optimal...........>60 mg/dL    LDL Cholesterol 63.0 - 159.0 mg/dL 200.0 High     Comment: The National Cholesterol Education Program (NCEP) has set the   following guidelines (reference values) for LDL Cholesterol:   Optimal.......................<130 mg/dL   Borderline High...............130-159 mg/dL   High..........................160-189 mg/dL   Very High.....................>190 mg/dL    HDL/Cholesterol Ratio 20.0 - 50.0 % 19.9 Low     Total Cholesterol/HDL Ratio 2.0 - 5.0 5.0    Non-HDL Cholesterol mg/dL 222    Comment: Risk category and Non-HDL cholesterol goals:   Coronary heart disease (CHD)or equivalent (10-year risk of CHD >20%):   Non-HDL cholesterol goal     <130 mg/dL   Two or more CHD risk factors and 10-year risk of CHD <= 20%:   Non-HDL cholesterol goal     <160 mg/dL   0 to 1 CHD risk factor:   Non-HDL  cholesterol goal     <190 mg/dL    Resulting Agency  [facility]              Specimen Collected: 09/23/23 09:50 CDT Last Resulted: 09/23/23 10:42 CDT               XR CHEST PA AND LATERAL  Status: Final result     MyChart Results Release    [employer] Status: Active  Results Release     PACS Images for [email or web address] Viewer     Show images for XR CHEST PA AND LATERAL  All Reviewers List    Jazzmine Cardona PA-C on 11/25/2022 18:58     XR CHEST PA AND LATERAL  Order: [id number]  Status: Final result     Visible to patient: Yes (seen)     Next appt: 12/18/2023 at 03:00 PM in Hematology and Oncology (Wing Watts MD)     Dx: Chest pain, unspecified type     0 Result Notes  Details    Reading Physician Reading Date Result Priority   Larry Millard MD  [phone] 11/25/2022 STAT     Narrative & Impression  EXAMINATION:  XR CHEST PA AND LATERAL     CLINICAL HISTORY:  Chest pain, unspecified     TECHNIQUE:  PA and lateral views of the chest were performed.     COMPARISON:  None     FINDINGS:  The lungs are clear, with normal appearance of pulmonary vasculature and no pleural effusion or pneumothorax.     The cardiac silhouette is normal in size. The hilar and mediastinal contours are unremarkable.     Bones are intact.     Impression:     No acute abnormality.        Electronically signed by: Larry Millard  Date:                                            11/25/2022  Time:                                           17:00           Exam Ended: 11/25/22 16:48 CST Last Resulted: 11/25/22 17:00 CST       Order Details     View Encounter     Lab and Collection Details     Routing     Result History     View All Conversations on this Encounter           Result Care Coordination        CTA STROKE MULTI-PHASE  Status: Final result     MyChart Results Release    [employer] Status: Active  Results Release     PACS Images for [email or web address] Viewer     Show images for CTA STROKE MULTI-PHASE  CTA STROKE MULTI-PHASE  Order:  [id number]  Status: Final result     Visible to patient: Yes (seen)     Next appt: 12/18/2023 at 03:00 PM in Hematology and Oncology (Wing Watts MD)     0 Result Notes  Details    Reading Physician Reading Date Result Priority   Royal Pacheco MD  [phone] 9/23/2023 STAT     Narrative & Impression  EXAMINATION:  CTA STROKE MULTI-PHASE     CLINICAL HISTORY:  Neuro deficit, acute, stroke suspected;     TECHNIQUE:  Non contrast low dose axial images were obtained thought the head. CT angiogram was performed from the level of the valentina to the top of the head following the IV administration of 100mL of Omnipaque 350.   Sagittal and coronal reconstructions and maximum intensity projection reconstructions were performed. Arterial stenosis percentages are based on NASCET measurement criteria.  Two additional phases of immediate post-contrast CTA images were performed through the head alone.     COMPARISON:  None     FINDINGS:  CT HEAD:     Blood: No acute intracranial hemorrhage.     Parenchyma: No definite loss of gray-white differentiation to suggest acute or subacute transcortical infarct.     Ventricles/Extra-axial spaces: No abnormal extra-axial fluid collection. Basal cisterns are patent.     Vessels: Grossly unremarkable by unenhanced technique.     Orbits: Unremarkable.     Scalp: Unremarkable.     Skull: There are no depressed skull fractures or destructive bone lesions.     Sinuses and mastoids: Scattered relatively modest paranasal sinus mucosal thickening.     Other findings: None     CTA:     NECK:     Imaged aortic arch: Nonaneurysmal.     Right common and cervical internal carotid arteries: No flow-limiting stenosis or dissection.     Left common and cervical internal carotid arteries: No flow-limiting stenosis or dissection.     Right cervical vertebral artery: There is no hemodynamically significant stenosis or dissection     Left cervical vertebral artery: There is no hemodynamically  significant stenosis or dissection.     HEAD:     Right anterior circulation:Normal Right ophthalmic artery is patent.     Left anterior circulation: ICA appears patent.  There is abrupt truncation of the proximal superior division M2 (axial source series 5, image 475; sagittal reformat series 605, image 160).  There is intermittent thready reconstitution distally, with multifocal additional short segment high-grade stenoses/focal occlusions of M2 branches within the sylvian fissure (for example as seen on axial source series 5, image 488-489).Left ophthalmic artery is patent.No flow-limiting stenosis of JAMES branches.     Posterior circulation: There is no hemodynamically significant vertebrobasilar stenosis. There is no significant PCA stenosis. Posterior inferior cerebellar arteries patent at origin.     Anterior and posterior communicating arteries: Penetration of the anterior communicating artery.  Small posterior communicating arteries are suggested.     NON-ANGIOGRAPHIC FINDINGS:     Visualized intracranial contents: As above.     Soft tissues of the neck: Heterogeneous thyroid gland with several nodules.  Reference 16 mm hypodense nodule in the left lobe (series 5, image 213).     Visualized sinuses: As above.     Dentition: Unremarkable.     Spine: Unremarkable.     Visualized lungs: Clear.     Impression:     1. Abrupt truncation of the proximal superior division M2.  There is intermittent thready reconstitution distally, with multifocal additional short segment high-grade stenoses/focal occlusions of M2 branches within the sylvian fissure.  2. No evidence of acute intracranial hemorrhage, mass effect, midline shift.  No definite early large territory infarct signs by CT.  3. Additional details as above.  Preliminary results regarding Impression #1 reviewed via Secure Chat with Dr. Tharsher at approximately 10:00, 09/23/2023.        Electronically signed by: Royal Pacheco  Date:                                             09/23/2023  Time:                                           10:10           Exam Ended: 09/23/23 09:46 CDT Last Resulted: 09/23/23 10:10 CDT            MRI Brain Without Contrast  Status: Final result     MyChart Results Release    MyChart Status: Active  Results Release     PACS Images for [employer] Viewer     Show images for MRI Brain Without Contrast   Contains abnormal data MRI Brain Without Contrast  Order: [id number]  Status: Final result     Visible to patient: Yes (seen)     Next appt: 12/18/2023 at 03:00 PM in Hematology and Oncology (Wing Watts MD)     0 Result Notes  Details    Reading Physician Reading Date Result Priority   Royal Pacheco MD  [phone] 9/23/2023 STAT     Narrative & Impression  EXAMINATION:  MRI BRAIN WITHOUT CONTRAST     CLINICAL HISTORY:  Stroke, follow up;     TECHNIQUE:  Multiplanar multisequence MR imaging of the brain was performed without contrast.     COMPARISON:  CTA head and neck performed 09/23/2023.     FINDINGS:  Parenchyma: Restricted diffusion is noted involving the posterior left frontal lobe cortex and subcortical white matter, including the precentral gyrus, as well as the lateral left basal ganglia and left corona radiata and centrum semiovale in keeping with recent ischemia.     No definite additional areas of ischemia and other vascular territories.  No acute intracranial hemorrhage, mass effect, or midline shift at the present time.     Additional comments: There is no midline shift, abnormal extra-axial fluid collection, or acute intracranial hemorrhage. The basal cisterns are patent.     Ventricles: Normal.     Flow voids: There is blooming of susceptibility related signal loss corresponding to area of thrombus within the left MCA as seen on earlier same day CTA.  Additionally, there is serpiginous FLAIR hyperintense signal within the sulci about the left cerebral hemisphere, in keeping with slow and/or disorganized flow related  to proximal stenosis/occlusion.     Major intracranial vascular flow voids are otherwise maintained.     Sinuses and mastoid air cells: Scattered relatively modest paranasal sinus mucosal thickening.     Orbits: Normal     Midline structures: The pituitary and craniocervical junction are normal.     Marrow: Normal        Impression:     1. Findings in keeping with recent infarction involving the left frontal cortex and subcortical white matter as well as involving the left centrum semiovale and corona radiata.  No associated macroscopic hemorrhage or mass effect at the present time.  2. Blooming of susceptibility related signal loss corresponding to area of thrombus within the left MCA as seen on earlier same day CTA.  Additionally, there is serpiginous FLAIR hyperintense signal within the sulci about the left cerebral hemisphere, in keeping with slow and/or disorganized flow related to proximal stenosis/occlusion.  This report was flagged in Epic as abnormal.        Electronically signed by: Royal Pacheco  Date:                                            09/23/2023  Time:                                           10:59                 IR Angiogram Cerebral Intracranial ea Add vessel  Status: Final result     MyChart Results Release    [employer] Status: Active  Results Release     PACS Images for [employer] Viewer     Show images for IR Angiogram Cerebral Intracranial ea Add vessel  All Reviewers List    Godwin Soriano MD on 11/21/2023 08:34     IR Angiogram Cerebral Intracranial ea Add vessel  Order: [id number]  Status: Final result     Visible to patient: Yes (seen)     Next appt: 12/18/2023 at 03:00 PM in Hematology and Oncology (Wing Watts MD)     0 Result Notes  Details    Reading Physician Reading Date Result Priority   Dallas Mcclure MD  [phone] 10/13/2023 STAT     Narrative & Impression     EXAM:     Cerebral angiogram.  Aspiration thrombectomy.     CPT CODES:     Internal carotid  angiography: 81339     Intracranial mechanical thrombectomy: 44954     Closure device:      CLINICAL HISTORY AND INDICATION:     Patient is a 42 y/o Female, referred to attempt to reperfuse flow to ischemic territory with aspiration thrombectomy, in order to minimize damage and save brain tissue.     PROCEDURE COMMENT:     Informed consent was obtained from the patient family  prior to the examination. A timeout was performed.     Sedation: Provided by anesthesia.     The right groin was prepared using standard sterile technique and a right common femoral artery puncture was performed with a micropuncture needle. A 8F sheath was placed and connected to saline flush.  Through this sheath, using a glidewire a 5F Cholo catheter was advanced inside a Zoom 88 access catheter and used to perform selective angiography of the following vessels: Left common and internal caortid arteries .     FINDINGS:     The left common carotid artery was selected and an angiogram was performed. Angiogram demonstrates no stenosis.     The left internal carotid artery was selected and an angiogram was performed. Angiogram demonstrates good intracranial flow w M1 occlusion.     INTERVENTION:     The Cholo catheter and Glidewire were removed.  Through the Zoom 88, a Zoom 71 aspiration catheter was advanced over a Zoom 35 microcatheter and a Fathom micro wire to the proximal segment of the occlusion.  The microcatheter and micro wire were then removed and aspiration was connected to the aspiration catheter (ADAPT technique).  After approximately 30 sec, the aspiration catheter was removed.     Follow-up angiography demonstrates much improved flow w only minimal distal branch occlusion.     The catheter was removed and an angiogram was performed through the femoral sheath demonstrating an access site appropriate for closure device.     Hemostasis was obtained in the right groin using the Angioseal device.     The total contrast dose for  the procedure was:  50 cc's of visipaque.     The total radiation dose was approximately: Radiation DAP 94747 CGycm2. Reference air kerma was 540 mGy.  Fluoro time was 5 minutes.     Physicians in the procedure:  Dr. Dallas Mcclure.  Godwin Soriano MD.  images and report were permanently recorded.     Impression:        1.  Successful aspiration thrombectomy of the left middle cerebral artery with resulting TICI 2c flow.        Electronically signed by: Dallas Mcclure MD  Date:                                            10/13/2023  Time:                                           08:44           Exam Ended: 09/23/23 11:35 CDT Last Resulted: 10/13/23 08:44 CDT                 Assessment:     1. Familial hypercholesterolemia    2. Embolic stroke involving left middle cerebral artery      Plan:   Rhonda was seen today for embolic stroke.    Diagnoses and all orders for this visit:    Familial hypercholesterolemia  -     Lipid Panel; Future    Embolic stroke involving left middle cerebral artery  -     Ambulatory referral/consult to Cardiology  -     Lipid Panel; Future    Etiology of stroke is unclear     Long discussion regarding benefit of atorvastatin    Long discussion regarding the risks (including broken tooth and esophageal perforation) benefits and alternatives to ISAIAH    Pt understands and wishes to proceed    Discussed with Dr Yeung who also spoke with pt.    Consider loop recorder    Continue ASA    Continue Medterranean diet    RTC 6 weeks with lipid profile    Follow up in about 6 weeks (around 1/24/2024).

## 2023-12-16 NOTE — PROGRESS NOTES
PATIENT: Rhonda Ahuja  MRN: [id number]  DATE: 2023    Diagnosis:   1. History of stroke    2. Vision changes    3. Abnormal laboratory test    4. Blood clotting disorder    5. Advance care planning      Chief Complaint: hypercoagulable state    Oncologic History:      Oncologic History     Oncologic Treatment     Pathology       Subjective:    History of Present Illness: Ms. Ahuja is a 43 y.o. female who presents for evaluation and management of potential clotting disorder. She is referred by Dr. Simpson.    - in 2023, patient had a large middle cerebral artery stroke (presenting symptom was worsening expressive aphasia). She underwent mechanical thrombectomy on 23.  - labs during the hospitalization revealed an elevated beta-2 glycoprotein IgM antibody.    - today, she endorses fatigue, migraine, blurry vision, nausea. She denies shortness of breath, chest pain, vomiting, diarrhea, constipation.  - she reports a miscarriage at about 9 weeks gestational age about 15 years ago.      Past medical, surgical, family, and social histories have been reviewed and updated below.    Past Medical History:   Past Medical History:   Diagnosis Date    HLD (hyperlipidemia) 2023    Stroke        Past Surgical History:   Past Surgical History:   Procedure Laterality Date    BREAST BIOPSY Right     excisional benign    BUNIONECTOMY       SECTION      x 2    TONSILLECTOMY         Family History:   Family History   Problem Relation Age of Onset    Hypertension Mother     Diabetes Mother     ALS Mother     Heart attack Father     Coronary artery disease Father     No Known Problems Sister     No Known Problems Brother     Breast cancer Maternal Grandmother     Colon cancer Neg Hx     Ovarian cancer Neg Hx        Social History:  reports that she has quit smoking. She has never used smokeless tobacco. She reports that she does not currently use alcohol. She reports that she does not use  drugs.    Allergies:  Review of patient's allergies indicates:  No Known Allergies    Medications:  Current Outpatient Medications   Medication Sig Dispense Refill    aspirin 81 MG Chew Chew and swallow 1 tablet (81 mg total) by mouth once daily. 30 tablet 11    atorvastatin (LIPITOR) 40 MG tablet Take 1 tablet (40 mg total) by mouth once daily. (Patient not taking: Reported on 12/13/2023) 90 tablet 3     No current facility-administered medications for this visit.       Review of Systems   Constitutional:  Positive for fatigue. Negative for appetite change and unexpected weight change.   HENT:  Negative for mouth sores and sore throat.    Eyes:  Positive for visual disturbance.   Respiratory:  Negative for cough and shortness of breath.    Cardiovascular:  Negative for chest pain.   Gastrointestinal:  Positive for nausea. Negative for abdominal pain, constipation, diarrhea and vomiting.   Genitourinary:  Negative for dysuria and frequency.   Musculoskeletal:  Negative for back pain.   Skin:  Negative for rash.   Neurological:  Positive for headaches.   Hematological:  Negative for adenopathy.   Psychiatric/Behavioral:  The patient is not nervous/anxious.        ECOG Performance Status:   ECOG SCORE    1 - Restricted in strenuous activity-ambulatory and able to carry out work of a light nature         Objective:      Vitals:   Vitals:    12/18/23 1453   BP: 121/79   BP Location: Left arm   Patient Position: Sitting   BP Method: Medium (Automatic)   Pulse: 77   Resp: 16   SpO2: 100%   Weight: 65.8 kg (145 lb 1 oz)     BMI: Body mass index is 24.9 kg/m².    Physical Exam  Vitals and nursing note reviewed.   Constitutional:       Appearance: She is well-developed.   HENT:      Head: Normocephalic and atraumatic.   Eyes:      Pupils: Pupils are equal, round, and reactive to light.   Cardiovascular:      Rate and Rhythm: Normal rate and regular rhythm.   Pulmonary:      Effort: Pulmonary effort is normal.      Breath  sounds: Normal breath sounds.   Abdominal:      General: Bowel sounds are normal.      Palpations: Abdomen is soft.   Musculoskeletal:         General: Normal range of motion.      Cervical back: Normal range of motion and neck supple.   Skin:     General: Skin is warm and dry.   Neurological:      Mental Status: She is alert and oriented to person, place, and time.   Psychiatric:         Behavior: Behavior normal.         Thought Content: Thought content normal.         Judgment: Judgment normal.         Laboratory Data:  Labs have been reviewed.    Lab Results   Component Value Date    WBC 6.20 10/02/2023    HGB 13.0 10/02/2023    HCT 38.9 10/02/2023    MCV 95 10/02/2023     10/02/2023       10/2/23:    Anticardiolipin IgA negative    Factor 5 leiden: normal          Imaging:    Assessment:       1. History of stroke    2. Vision changes    3. Abnormal laboratory test    4. Blood clotting disorder    5. Advance care planning           Plan:     Abnormal lab test / history of stroke  - I have reviewed her chart  - in September 2023, patient had a large middle cerebral artery stroke (presenting symptom was worsening expressive aphasia). She underwent mechanical thrombectomy on 9/23/23.  - labs during the hospitalization revealed an elevated beta-2 glycoprotein IgM antibody.  - she is currently taking aspirin.  - I will order further workup, including repeating her beta-2 glycoprotein, along with other labs.  - she reports a miscarriage at about 9 weeks gestational age about 15 years ago.  - refer to rheumatology for evaluation for possible antiphospholipid syndrome  - I will contact her with results of testing. If needed, I will schedule a follow-up appointment.    2. Vision changes  - new onset  - refer to emergency room for further evaluation.    3. Advance Care Planning     Date: 12/18/2023    Power of   I initiated the process of voluntary advance care planning today and explained the importance  of this process to the patient.  I introduced the concept of advance directives to the patient, as well. Then the patient received detailed information about the importance of designating a Health Care Power of  (HCPOA). She was also instructed to communicate with this person about their wishes for future healthcare, should she become sick and lose decision-making capacity. The patient has not previously appointed a HCPOA. After our discussion, the patient has decided to complete a HCPOA and has appointed her  boyfriend Conrad Jennings ([phone]) and sister Meera Licea ([phone]) . I spent a total time of 16 minutes discussing this issue with the patient.        - I will contact her with results of testing. If needed, I will schedule a follow-up appointment.      Wing Watts M.D.  Hematology/Oncology  [facility] [address], [address]  [place] [address]  Phone: [phone]  Fax: [phone]   no fever and no chills.

## 2023-12-18 ENCOUNTER — OFFICE VISIT (OUTPATIENT)
Dept: HEMATOLOGY/ONCOLOGY | Facility: CLINIC | Age: 43
End: 2023-12-18
Payer: MEDICAID

## 2023-12-18 VITALS
RESPIRATION RATE: 16 BRPM | DIASTOLIC BLOOD PRESSURE: 79 MMHG | HEART RATE: 77 BPM | BODY MASS INDEX: 24.9 KG/M2 | SYSTOLIC BLOOD PRESSURE: 121 MMHG | WEIGHT: 145.06 LBS | OXYGEN SATURATION: 100 %

## 2023-12-18 DIAGNOSIS — Z86.73 HISTORY OF STROKE: Primary | ICD-10-CM

## 2023-12-18 DIAGNOSIS — H53.9 VISION CHANGES: ICD-10-CM

## 2023-12-18 DIAGNOSIS — D68.9 BLOOD CLOTTING DISORDER: ICD-10-CM

## 2023-12-18 DIAGNOSIS — R89.9 ABNORMAL LABORATORY TEST: ICD-10-CM

## 2023-12-18 DIAGNOSIS — Z71.89 ADVANCE CARE PLANNING: ICD-10-CM

## 2023-12-18 PROCEDURE — 99497 ADVNCD CARE PLAN 30 MIN: CPT | Mod: PBBFAC,PO | Performed by: INTERNAL MEDICINE

## 2023-12-18 PROCEDURE — 99204 OFFICE O/P NEW MOD 45 MIN: CPT | Mod: S$PBB,,, | Performed by: INTERNAL MEDICINE

## 2023-12-18 PROCEDURE — 99497 PR ADVNCD CARE PLAN 30 MIN: ICD-10-PCS | Mod: S$PBB,,, | Performed by: INTERNAL MEDICINE

## 2023-12-18 PROCEDURE — 99999 PR PBB SHADOW E&M-EST. PATIENT-LVL IV: ICD-10-PCS | Mod: PBBFAC,,, | Performed by: INTERNAL MEDICINE

## 2023-12-18 PROCEDURE — 3044F HG A1C LEVEL LT 7.0%: CPT | Mod: CPTII,,, | Performed by: INTERNAL MEDICINE

## 2023-12-18 PROCEDURE — 1160F PR REVIEW ALL MEDS BY PRESCRIBER/CLIN PHARMACIST DOCUMENTED: ICD-10-PCS | Mod: CPTII,,, | Performed by: INTERNAL MEDICINE

## 2023-12-18 PROCEDURE — 99999 PR PBB SHADOW E&M-EST. PATIENT-LVL IV: CPT | Mod: PBBFAC,,, | Performed by: INTERNAL MEDICINE

## 2023-12-18 PROCEDURE — 1160F RVW MEDS BY RX/DR IN RCRD: CPT | Mod: CPTII,,, | Performed by: INTERNAL MEDICINE

## 2023-12-18 PROCEDURE — 3078F DIAST BP <80 MM HG: CPT | Mod: CPTII,,, | Performed by: INTERNAL MEDICINE

## 2023-12-18 PROCEDURE — 3008F BODY MASS INDEX DOCD: CPT | Mod: CPTII,,, | Performed by: INTERNAL MEDICINE

## 2023-12-18 PROCEDURE — 3078F PR MOST RECENT DIASTOLIC BLOOD PRESSURE < 80 MM HG: ICD-10-PCS | Mod: CPTII,,, | Performed by: INTERNAL MEDICINE

## 2023-12-18 PROCEDURE — 99214 OFFICE O/P EST MOD 30 MIN: CPT | Mod: PBBFAC,PO | Performed by: INTERNAL MEDICINE

## 2023-12-18 PROCEDURE — 99204 PR OFFICE/OUTPT VISIT, NEW, LEVL IV, 45-59 MIN: ICD-10-PCS | Mod: S$PBB,,, | Performed by: INTERNAL MEDICINE

## 2023-12-18 PROCEDURE — 1159F PR MEDICATION LIST DOCUMENTED IN MEDICAL RECORD: ICD-10-PCS | Mod: CPTII,,, | Performed by: INTERNAL MEDICINE

## 2023-12-18 PROCEDURE — 3044F PR MOST RECENT HEMOGLOBIN A1C LEVEL <7.0%: ICD-10-PCS | Mod: CPTII,,, | Performed by: INTERNAL MEDICINE

## 2023-12-18 PROCEDURE — 3008F PR BODY MASS INDEX (BMI) DOCUMENTED: ICD-10-PCS | Mod: CPTII,,, | Performed by: INTERNAL MEDICINE

## 2023-12-18 PROCEDURE — 1159F MED LIST DOCD IN RCRD: CPT | Mod: CPTII,,, | Performed by: INTERNAL MEDICINE

## 2023-12-18 PROCEDURE — 3074F SYST BP LT 130 MM HG: CPT | Mod: CPTII,,, | Performed by: INTERNAL MEDICINE

## 2023-12-18 PROCEDURE — 99497 ADVNCD CARE PLAN 30 MIN: CPT | Mod: S$PBB,,, | Performed by: INTERNAL MEDICINE

## 2023-12-18 PROCEDURE — 3074F PR MOST RECENT SYSTOLIC BLOOD PRESSURE < 130 MM HG: ICD-10-PCS | Mod: CPTII,,, | Performed by: INTERNAL MEDICINE

## 2023-12-18 RX ORDER — NAPROXEN SODIUM 220 MG/1
81 TABLET, FILM COATED ORAL DAILY
Qty: 90 TABLET | Refills: 3 | Status: ON HOLD | OUTPATIENT
Start: 2023-12-18 | End: 2024-01-17 | Stop reason: HOSPADM

## 2023-12-19 ENCOUNTER — TELEPHONE (OUTPATIENT)
Dept: FAMILY MEDICINE | Facility: HOSPITAL | Age: 43
End: 2023-12-19
Payer: MEDICAID

## 2023-12-19 ENCOUNTER — TELEPHONE (OUTPATIENT)
Dept: PULMONOLOGY | Facility: CLINIC | Age: 43
End: 2023-12-19
Payer: MEDICAID

## 2023-12-19 ENCOUNTER — TELEPHONE (OUTPATIENT)
Dept: ALLERGY | Facility: CLINIC | Age: 43
End: 2023-12-19
Payer: MEDICAID

## 2023-12-19 NOTE — TELEPHONE ENCOUNTER
----- Message from Yamilet Haley sent at 12/19/2023  1:40 PM CST -----  Regarding: Appt  Contact: [phone]  Per MD sasha Hilliard advised pt to leave a message so the provider can squeeze her in to be seen. Dr Ashton advised pt to call to schedule. Pt would like to keep her appt with Dr Gil.

## 2023-12-19 NOTE — TELEPHONE ENCOUNTER
----- Message from Yamilet Haley sent at 12/19/2023  1:03 PM CST -----  Regarding: Regarding appt on 12/26  Contact: [phone]  Appt has been scheduled on 12/26 per pt would like to the know the details of this visit. I was not able  to provide that info. Pt is wondering if an allergy test will be done at the visit please call to discuss.

## 2023-12-19 NOTE — TELEPHONE ENCOUNTER
Msg left for the patient to contact our office when discharged so a hospital follow up can be scheduled.   ----- Message from Zunilda Bai sent at 12/19/2023 12:53 PM CST -----  Type:  hospital f/u    Caller is requesting a sooner appointment.  Caller declined first available appointment listed below.  Caller will not accept being placed on the waitlist and is requesting a message be sent to doctor.  Name of Caller:wana w/ [facility]  When is the first available appointment?01/02  Symptoms:hospital f/u  Would the patient rather a call back or a response via [facility]? call  Best Call Back Number:[phone]  Additional Information:

## 2023-12-25 PROBLEM — I63.412 EMBOLIC STROKE INVOLVING LEFT MIDDLE CEREBRAL ARTERY: Status: RESOLVED | Noted: 2023-09-23 | Resolved: 2023-12-25

## 2023-12-26 PROBLEM — I63.9 EMBOLIC STROKE: Status: ACTIVE | Noted: 2023-12-26

## 2024-01-05 ENCOUNTER — DOCUMENTATION ONLY (OUTPATIENT)
Dept: NEUROLOGY | Facility: HOSPITAL | Age: 44
End: 2024-01-05
Payer: MEDICAID

## 2024-01-09 ENCOUNTER — TELEPHONE (OUTPATIENT)
Dept: CARDIOLOGY | Facility: CLINIC | Age: 44
End: 2024-01-09
Payer: MEDICAID

## 2024-01-09 ENCOUNTER — PATIENT MESSAGE (OUTPATIENT)
Dept: CARDIOLOGY | Facility: CLINIC | Age: 44
End: 2024-01-09
Payer: MEDICAID

## 2024-01-09 NOTE — TELEPHONE ENCOUNTER
Attempted to reach patient, appointment with Dr Mancilla on 2/14/24 needs to be rescheduled due to provider being out of office at that time, voicemail full.  Sent message through My Chart asking that she contact office to reschedule.

## 2024-01-10 ENCOUNTER — HOSPITAL ENCOUNTER (INPATIENT)
Facility: HOSPITAL | Age: 44
LOS: 7 days | Discharge: HOME | DRG: 815 | End: 2024-01-17
Attending: EMERGENCY MEDICINE | Admitting: STUDENT IN AN ORGANIZED HEALTH CARE EDUCATION/TRAINING PROGRAM
Payer: MEDICAID

## 2024-01-10 DIAGNOSIS — Z86.73 HISTORY OF CEREBROVASCULAR ACCIDENT (CVA) DUE TO EMBOLISM: ICD-10-CM

## 2024-01-10 DIAGNOSIS — R94.31 ECG ABNORMAL: ICD-10-CM

## 2024-01-10 DIAGNOSIS — E78.5 HYPERLIPIDEMIA, UNSPECIFIED HYPERLIPIDEMIA TYPE: ICD-10-CM

## 2024-01-10 DIAGNOSIS — D73.5 SPLENIC INFARCT: Primary | ICD-10-CM

## 2024-01-10 DIAGNOSIS — R10.9 LEFT SIDED ABDOMINAL PAIN: ICD-10-CM

## 2024-01-10 DIAGNOSIS — D68.61 ANTIPHOSPHOLIPID SYNDROME: ICD-10-CM

## 2024-01-10 DIAGNOSIS — R94.31 PROLONGED Q-T INTERVAL ON ECG: ICD-10-CM

## 2024-01-10 DIAGNOSIS — D73.5 SPLENIC INFARCTION: ICD-10-CM

## 2024-01-10 LAB
ALBUMIN SERPL BCP-MCNC: 3.9 G/DL (ref 3.5–5.2)
ALP SERPL-CCNC: 58 U/L (ref 55–135)
ALT SERPL W/O P-5'-P-CCNC: 15 U/L (ref 10–44)
ANION GAP SERPL CALC-SCNC: 11 MMOL/L (ref 8–16)
APTT PPP: 26.9 SEC (ref 21–32)
AST SERPL-CCNC: 27 U/L (ref 10–40)
B-HCG UR QL: NEGATIVE
BASOPHILS # BLD AUTO: 0.07 K/UL (ref 0–0.2)
BASOPHILS # BLD AUTO: 0.11 K/UL (ref 0–0.2)
BASOPHILS NFR BLD: 0.9 % (ref 0–1.9)
BASOPHILS NFR BLD: 1.2 % (ref 0–1.9)
BILIRUB SERPL-MCNC: 0.4 MG/DL (ref 0.1–1)
BILIRUB UR QL STRIP: NEGATIVE
BUN SERPL-MCNC: 7 MG/DL (ref 6–20)
CALCIUM SERPL-MCNC: 10 MG/DL (ref 8.7–10.5)
CHLORIDE SERPL-SCNC: 101 MMOL/L (ref 95–110)
CLARITY UR: CLEAR
CO2 SERPL-SCNC: 18 MMOL/L (ref 23–29)
COLOR UR: YELLOW
CREAT SERPL-MCNC: 0.7 MG/DL (ref 0.5–1.4)
CRP SERPL-MCNC: 24.5 MG/L (ref 0–8.2)
CTP QC/QA: YES
DIFFERENTIAL METHOD BLD: ABNORMAL
DIFFERENTIAL METHOD BLD: ABNORMAL
EOSINOPHIL # BLD AUTO: 0.1 K/UL (ref 0–0.5)
EOSINOPHIL # BLD AUTO: 0.1 K/UL (ref 0–0.5)
EOSINOPHIL NFR BLD: 0.8 % (ref 0–8)
EOSINOPHIL NFR BLD: 1.2 % (ref 0–8)
ERYTHROCYTE [DISTWIDTH] IN BLOOD BY AUTOMATED COUNT: 11.9 % (ref 11.5–14.5)
ERYTHROCYTE [DISTWIDTH] IN BLOOD BY AUTOMATED COUNT: 12.3 % (ref 11.5–14.5)
ERYTHROCYTE [SEDIMENTATION RATE] IN BLOOD BY PHOTOMETRIC METHOD: 30 MM/HR (ref 0–36)
EST. GFR  (NO RACE VARIABLE): >60 ML/MIN/1.73 M^2
GLUCOSE SERPL-MCNC: 94 MG/DL (ref 70–110)
GLUCOSE UR QL STRIP: NEGATIVE
HCT VFR BLD AUTO: 35.4 % (ref 37–48.5)
HCT VFR BLD AUTO: 40 % (ref 37–48.5)
HGB BLD-MCNC: 12.1 G/DL (ref 12–16)
HGB BLD-MCNC: 13.1 G/DL (ref 12–16)
HGB UR QL STRIP: ABNORMAL
IMM GRANULOCYTES # BLD AUTO: 0.02 K/UL (ref 0–0.04)
IMM GRANULOCYTES # BLD AUTO: 0.03 K/UL (ref 0–0.04)
IMM GRANULOCYTES NFR BLD AUTO: 0.3 % (ref 0–0.5)
IMM GRANULOCYTES NFR BLD AUTO: 0.3 % (ref 0–0.5)
INR PPP: 1 (ref 0.8–1.2)
KETONES UR QL STRIP: NEGATIVE
LACTATE SERPL-SCNC: 1.2 MMOL/L (ref 0.5–2.2)
LDH SERPL L TO P-CCNC: 206 U/L (ref 110–260)
LEUKOCYTE ESTERASE UR QL STRIP: NEGATIVE
LIPASE SERPL-CCNC: 12 U/L (ref 4–60)
LYMPHOCYTES # BLD AUTO: 2.3 K/UL (ref 1–4.8)
LYMPHOCYTES # BLD AUTO: 2.5 K/UL (ref 1–4.8)
LYMPHOCYTES NFR BLD: 25.5 % (ref 18–48)
LYMPHOCYTES NFR BLD: 33.5 % (ref 18–48)
MAGNESIUM SERPL-MCNC: 2.2 MG/DL (ref 1.6–2.6)
MCH RBC QN AUTO: 31.4 PG (ref 27–31)
MCH RBC QN AUTO: 31.7 PG (ref 27–31)
MCHC RBC AUTO-ENTMCNC: 32.8 G/DL (ref 32–36)
MCHC RBC AUTO-ENTMCNC: 34.2 G/DL (ref 32–36)
MCV RBC AUTO: 92 FL (ref 82–98)
MCV RBC AUTO: 97 FL (ref 82–98)
MONOCYTES # BLD AUTO: 0.7 K/UL (ref 0.3–1)
MONOCYTES # BLD AUTO: 0.7 K/UL (ref 0.3–1)
MONOCYTES NFR BLD: 7.9 % (ref 4–15)
MONOCYTES NFR BLD: 9.9 % (ref 4–15)
NEUTROPHILS # BLD AUTO: 4.1 K/UL (ref 1.8–7.7)
NEUTROPHILS # BLD AUTO: 5.7 K/UL (ref 1.8–7.7)
NEUTROPHILS NFR BLD: 54.2 % (ref 38–73)
NEUTROPHILS NFR BLD: 64.3 % (ref 38–73)
NITRITE UR QL STRIP: NEGATIVE
NRBC BLD-RTO: 0 /100 WBC
NRBC BLD-RTO: 0 /100 WBC
PATH REV BLD -IMP: NORMAL
PH UR STRIP: 6 [PH] (ref 5–8)
PHOSPHATE SERPL-MCNC: 2.4 MG/DL (ref 2.7–4.5)
PLATELET # BLD AUTO: 296 K/UL (ref 150–450)
PLATELET # BLD AUTO: 333 K/UL (ref 150–450)
PMV BLD AUTO: 10.5 FL (ref 9.2–12.9)
PMV BLD AUTO: 9.5 FL (ref 9.2–12.9)
POTASSIUM SERPL-SCNC: 5.1 MMOL/L (ref 3.5–5.1)
PROT SERPL-MCNC: 8.3 G/DL (ref 6–8.4)
PROT UR QL STRIP: NEGATIVE
PROTHROMBIN TIME: 10.5 SEC (ref 9–12.5)
RBC # BLD AUTO: 3.85 M/UL (ref 4–5.4)
RBC # BLD AUTO: 4.13 M/UL (ref 4–5.4)
SODIUM SERPL-SCNC: 130 MMOL/L (ref 136–145)
SP GR UR STRIP: 1.01 (ref 1–1.03)
URN SPEC COLLECT METH UR: ABNORMAL
UROBILINOGEN UR STRIP-ACNC: NEGATIVE EU/DL
WBC # BLD AUTO: 7.49 K/UL (ref 3.9–12.7)
WBC # BLD AUTO: 8.89 K/UL (ref 3.9–12.7)

## 2024-01-10 PROCEDURE — 85060 BLOOD SMEAR INTERPRETATION: CPT | Mod: ,,, | Performed by: PATHOLOGY

## 2024-01-10 PROCEDURE — 93010 ELECTROCARDIOGRAM REPORT: CPT | Mod: ,,, | Performed by: INTERNAL MEDICINE

## 2024-01-10 PROCEDURE — 80053 COMPREHEN METABOLIC PANEL: CPT

## 2024-01-10 PROCEDURE — 83735 ASSAY OF MAGNESIUM: CPT

## 2024-01-10 PROCEDURE — 96361 HYDRATE IV INFUSION ADD-ON: CPT

## 2024-01-10 PROCEDURE — 25000003 PHARM REV CODE 250

## 2024-01-10 PROCEDURE — 84100 ASSAY OF PHOSPHORUS: CPT

## 2024-01-10 PROCEDURE — 85610 PROTHROMBIN TIME: CPT | Performed by: EMERGENCY MEDICINE

## 2024-01-10 PROCEDURE — G0378 HOSPITAL OBSERVATION PER HR: HCPCS

## 2024-01-10 PROCEDURE — 25000003 PHARM REV CODE 250: Performed by: EMERGENCY MEDICINE

## 2024-01-10 PROCEDURE — 12000002 HC ACUTE/MED SURGE SEMI-PRIVATE ROOM

## 2024-01-10 PROCEDURE — 63600175 PHARM REV CODE 636 W HCPCS

## 2024-01-10 PROCEDURE — 83690 ASSAY OF LIPASE: CPT

## 2024-01-10 PROCEDURE — 99285 EMERGENCY DEPT VISIT HI MDM: CPT | Mod: 25

## 2024-01-10 PROCEDURE — 96375 TX/PRO/DX INJ NEW DRUG ADDON: CPT

## 2024-01-10 PROCEDURE — 86146 BETA-2 GLYCOPROTEIN ANTIBODY: CPT | Mod: 59

## 2024-01-10 PROCEDURE — 83615 LACTATE (LD) (LDH) ENZYME: CPT | Performed by: EMERGENCY MEDICINE

## 2024-01-10 PROCEDURE — 81025 URINE PREGNANCY TEST: CPT

## 2024-01-10 PROCEDURE — 85025 COMPLETE CBC W/AUTO DIFF WBC: CPT

## 2024-01-10 PROCEDURE — 85613 RUSSELL VIPER VENOM DILUTED: CPT

## 2024-01-10 PROCEDURE — 85025 COMPLETE CBC W/AUTO DIFF WBC: CPT | Mod: 91

## 2024-01-10 PROCEDURE — 86140 C-REACTIVE PROTEIN: CPT

## 2024-01-10 PROCEDURE — 86147 CARDIOLIPIN ANTIBODY EA IG: CPT

## 2024-01-10 PROCEDURE — 25500020 PHARM REV CODE 255: Performed by: EMERGENCY MEDICINE

## 2024-01-10 PROCEDURE — 96372 THER/PROPH/DIAG INJ SC/IM: CPT

## 2024-01-10 PROCEDURE — 93005 ELECTROCARDIOGRAM TRACING: CPT

## 2024-01-10 PROCEDURE — 63600175 PHARM REV CODE 636 W HCPCS: Performed by: EMERGENCY MEDICINE

## 2024-01-10 PROCEDURE — 93010 ELECTROCARDIOGRAM REPORT: CPT | Mod: 59,,, | Performed by: INTERNAL MEDICINE

## 2024-01-10 PROCEDURE — 81003 URINALYSIS AUTO W/O SCOPE: CPT

## 2024-01-10 PROCEDURE — 96374 THER/PROPH/DIAG INJ IV PUSH: CPT

## 2024-01-10 PROCEDURE — 85652 RBC SED RATE AUTOMATED: CPT

## 2024-01-10 PROCEDURE — 83605 ASSAY OF LACTIC ACID: CPT | Performed by: EMERGENCY MEDICINE

## 2024-01-10 PROCEDURE — 85730 THROMBOPLASTIN TIME PARTIAL: CPT | Performed by: EMERGENCY MEDICINE

## 2024-01-10 RX ORDER — SODIUM CHLORIDE 9 MG/ML
1000 INJECTION, SOLUTION INTRAVENOUS
Status: COMPLETED | OUTPATIENT
Start: 2024-01-10 | End: 2024-01-10

## 2024-01-10 RX ORDER — IBUPROFEN 200 MG
16 TABLET ORAL
Status: DISCONTINUED | OUTPATIENT
Start: 2024-01-10 | End: 2024-01-17 | Stop reason: HOSPADM

## 2024-01-10 RX ORDER — IBUPROFEN 200 MG
24 TABLET ORAL
Status: DISCONTINUED | OUTPATIENT
Start: 2024-01-10 | End: 2024-01-17 | Stop reason: HOSPADM

## 2024-01-10 RX ORDER — TALC
6 POWDER (GRAM) TOPICAL NIGHTLY PRN
Status: DISCONTINUED | OUTPATIENT
Start: 2024-01-10 | End: 2024-01-17 | Stop reason: HOSPADM

## 2024-01-10 RX ORDER — ONDANSETRON HYDROCHLORIDE 2 MG/ML
4 INJECTION, SOLUTION INTRAVENOUS EVERY 8 HOURS PRN
Status: DISCONTINUED | OUTPATIENT
Start: 2024-01-10 | End: 2024-01-17 | Stop reason: HOSPADM

## 2024-01-10 RX ORDER — GLUCAGON 1 MG
1 KIT INJECTION
Status: DISCONTINUED | OUTPATIENT
Start: 2024-01-10 | End: 2024-01-17 | Stop reason: HOSPADM

## 2024-01-10 RX ORDER — MORPHINE SULFATE 4 MG/ML
4 INJECTION, SOLUTION INTRAMUSCULAR; INTRAVENOUS
Status: COMPLETED | OUTPATIENT
Start: 2024-01-10 | End: 2024-01-10

## 2024-01-10 RX ORDER — ATORVASTATIN CALCIUM 40 MG/1
40 TABLET, FILM COATED ORAL DAILY
Status: DISCONTINUED | OUTPATIENT
Start: 2024-01-11 | End: 2024-01-17 | Stop reason: HOSPADM

## 2024-01-10 RX ORDER — ACETAMINOPHEN 325 MG/1
650 TABLET ORAL EVERY 4 HOURS PRN
Status: DISCONTINUED | OUTPATIENT
Start: 2024-01-10 | End: 2024-01-15

## 2024-01-10 RX ORDER — SODIUM CHLORIDE 0.9 % (FLUSH) 0.9 %
5 SYRINGE (ML) INJECTION
Status: DISCONTINUED | OUTPATIENT
Start: 2024-01-10 | End: 2024-01-17 | Stop reason: HOSPADM

## 2024-01-10 RX ORDER — NALOXONE HCL 0.4 MG/ML
0.02 VIAL (ML) INJECTION
Status: DISCONTINUED | OUTPATIENT
Start: 2024-01-10 | End: 2024-01-17 | Stop reason: HOSPADM

## 2024-01-10 RX ORDER — ENOXAPARIN SODIUM 100 MG/ML
1 INJECTION SUBCUTANEOUS EVERY 12 HOURS
Status: DISCONTINUED | OUTPATIENT
Start: 2024-01-10 | End: 2024-01-11

## 2024-01-10 RX ORDER — OXYCODONE AND ACETAMINOPHEN 5; 325 MG/1; MG/1
1 TABLET ORAL EVERY 6 HOURS PRN
Status: DISCONTINUED | OUTPATIENT
Start: 2024-01-10 | End: 2024-01-12

## 2024-01-10 RX ORDER — NAPROXEN SODIUM 220 MG/1
81 TABLET, FILM COATED ORAL DAILY
Status: DISCONTINUED | OUTPATIENT
Start: 2024-01-11 | End: 2024-01-15

## 2024-01-10 RX ORDER — AMOXICILLIN 250 MG
1 CAPSULE ORAL 2 TIMES DAILY PRN
Status: DISCONTINUED | OUTPATIENT
Start: 2024-01-10 | End: 2024-01-17 | Stop reason: HOSPADM

## 2024-01-10 RX ORDER — ONDANSETRON HYDROCHLORIDE 2 MG/ML
4 INJECTION, SOLUTION INTRAVENOUS
Status: COMPLETED | OUTPATIENT
Start: 2024-01-10 | End: 2024-01-10

## 2024-01-10 RX ADMIN — ENOXAPARIN SODIUM 70 MG: 80 INJECTION SUBCUTANEOUS at 06:01

## 2024-01-10 RX ADMIN — OXYCODONE HYDROCHLORIDE AND ACETAMINOPHEN 1 TABLET: 5; 325 TABLET ORAL at 06:01

## 2024-01-10 RX ADMIN — IOHEXOL 100 ML: 350 INJECTION, SOLUTION INTRAVENOUS at 12:01

## 2024-01-10 RX ADMIN — SODIUM CHLORIDE 999 ML: 9 INJECTION, SOLUTION INTRAVENOUS at 11:01

## 2024-01-10 RX ADMIN — ONDANSETRON 4 MG: 2 INJECTION INTRAMUSCULAR; INTRAVENOUS at 11:01

## 2024-01-10 RX ADMIN — MORPHINE SULFATE 4 MG: 4 INJECTION INTRAVENOUS at 11:01

## 2024-01-10 NOTE — ASSESSMENT & PLAN NOTE
Patient with history of CVA in September, no current headache or neurological symptoms. Being worked up for APLS    Plan:  ASA, statin

## 2024-01-10 NOTE — ASSESSMENT & PLAN NOTE
Lab Results   Component Value Date    CHOL 222 (H) 12/18/2023    CHOL 277 (H) 09/23/2023     Lab Results   Component Value Date    HDL 54 12/18/2023    HDL 55 09/23/2023     Lab Results   Component Value Date    LDLCALC 155.6 12/18/2023    LDLCALC 200.0 (H) 09/23/2023     Lab Results   Component Value Date    TRIG 62 12/18/2023    TRIG 110 09/23/2023       Lab Results   Component Value Date    CHOLHDL 24.3 12/18/2023    CHOLHDL 19.9 (L) 09/23/2023     Patient with history of hyperlipemia and history of stroke    Plan:  Atorvastatin 40

## 2024-01-10 NOTE — ED TRIAGE NOTES
Pt presents to ED today c/o LLQ abdominal pain that radiates to left flank   Reports she was seen yesterday at UC and tx for constipation with no relief   Denies associated urinary sx

## 2024-01-10 NOTE — ED PROVIDER NOTES
Encounter Date: 1/10/2024       History     Chief Complaint   Patient presents with    Abdominal Pain     Pt presents to ED with c/o abdominal since Monday with flack pain. Pt went to urgent care yesterday and urine result WNL.      Pt is a 43 yo F with a pmhx of embolic stroke, HLD who presents to the ED with the complaint of abdominal pain.  Patient reports left lower quadrant abdominal pain intermittent over the past 2 days.  She states the pain to randomly strike causing pain to the LLQ. She states that it is sometimes worse with movement. She reports some relief of the pain with change in body position. She denies any trauma. She denies any fever, chills, nausea, vomiting, hematuria,  complaints. She reports a hx of  but denies any other abdominal surgery. She was seen at a local urgent care yesterday and diagnosed with constipation (she states that she typically has sporadic bowel movements, last one being approximately 3 days ago). She takes ASA daily but no other medications. She denies any chest pain, shortness of breath, chills, rigors, bloody stools. She has not taken anything for her pain. She states she is just now completing her menstrual cycle.       Review of patient's allergies indicates:  No Known Allergies  Past Medical History:   Diagnosis Date    HLD (hyperlipidemia) 2023    Stroke      Past Surgical History:   Procedure Laterality Date    BREAST BIOPSY Right     excisional benign    BUNIONECTOMY       SECTION      x 2    TONSILLECTOMY      TRANSESOPHAGEAL ECHOCARDIOGRAPHY N/A 2023    TRANSESOPHAGEAL ECHOCARDIOGRAPHY N/A 2023    Procedure: ECHOCARDIOGRAM, TRANSESOPHAGEAL;  Surgeon: Wing Yeung MD;  Location: [facility] CATH LAB;  Service: Cardiology;  Laterality: N/A;     Family History   Problem Relation Age of Onset    Hypertension Mother     Diabetes Mother     ALS Mother     Heart attack Father     Coronary artery disease Father     No Known Problems Sister      No Known Problems Brother     Breast cancer Maternal Grandmother     Colon cancer Neg Hx     Ovarian cancer Neg Hx      Social History     Tobacco Use    Smoking status: Never    Smokeless tobacco: Never   Substance Use Topics    Alcohol use: Not Currently    Drug use: Never     Review of Systems   Constitutional:  Negative for chills and fever.   Respiratory:  Negative for cough and chest tightness.    Cardiovascular:  Negative for chest pain, palpitations and leg swelling.   Gastrointestinal:  Positive for abdominal pain and constipation (baseline for her). Negative for nausea and vomiting.   Genitourinary:  Negative for dysuria and flank pain.   Musculoskeletal:  Negative for back pain and joint swelling.   Psychiatric/Behavioral:  Negative for agitation and confusion.        Physical Exam     Initial Vitals [01/10/24 1018]   BP Pulse Resp Temp SpO2   112/70 (!) 18 17 97.9 °F (36.6 °C) 98 %      MAP       --         Physical Exam    Nursing note and vitals reviewed.  Constitutional: She appears well-developed and well-nourished.   Uncomfortable appearing  Non toxic  Not septic  No acute distress    HENT:   Head: Normocephalic and atraumatic.   Right Ear: External ear normal.   Left Ear: External ear normal.   Eyes: EOM are normal. Pupils are equal, round, and reactive to light.   Neck: Neck supple.   Normal range of motion.  Cardiovascular:  Normal rate, regular rhythm, normal heart sounds and intact distal pulses.           Pulmonary/Chest: Breath sounds normal. No respiratory distress. She has no wheezes. She has no rales.   Abdominal: Abdomen is soft. Bowel sounds are normal. She exhibits no distension and no abdominal bruit. No signs of injury. There is abdominal tenderness in the left lower quadrant. No hernia.   TTP LLQ  Abd soft  +CVA tenderness to the L   No skin changes (no flank or periumbilical ecchymosis)  RLQ and RUQ non tender to palpation      There is no rebound and no guarding. negative  obturator sign and negative psoas sign  Musculoskeletal:         General: Normal range of motion.      Cervical back: Normal range of motion and neck supple.     Neurological: She is alert and oriented to person, place, and time. She has normal strength. GCS score is 15. GCS eye subscore is 4. GCS verbal subscore is 5. GCS motor subscore is 6.         ED Course   Critical Care    Date/Time: 1/10/2024 5:12 PM    Performed by: Arron Ewing MD  Authorized by: Arron Ewing MD  Direct patient critical care time: 15 minutes  Additional history critical care time: 5 minutes  Ordering / reviewing critical care time: 10 minutes  Documentation critical care time: 10 minutes  Consulting other physicians critical care time: 10 minutes  Total critical care time (exclusive of procedural time) : 50 minutes        Labs Reviewed   CBC W/ AUTO DIFFERENTIAL - Abnormal; Notable for the following components:       Result Value    MCH 31.7 (*)     All other components within normal limits   COMPREHENSIVE METABOLIC PANEL - Abnormal; Notable for the following components:    Sodium 130 (*)     CO2 18 (*)     All other components within normal limits   URINALYSIS, REFLEX TO URINE CULTURE - Abnormal; Notable for the following components:    Occult Blood UA Trace (*)     All other components within normal limits    Narrative:     Specimen Source->Urine   C-REACTIVE PROTEIN - Abnormal; Notable for the following components:    CRP 24.5 (*)     All other components within normal limits   CBC W/ AUTO DIFFERENTIAL - Abnormal; Notable for the following components:    RBC 3.85 (*)     Hematocrit 35.4 (*)     MCH 31.4 (*)     All other components within normal limits   LIPASE   LACTIC ACID, PLASMA   LACTATE DEHYDROGENASE    Narrative:     Collection has been rescheduled by SUHAS at 01/10/2024 14:14 Reason:   Patient unavailable/dr nguyen pt   PROTIME-INR    Narrative:     Collection has been rescheduled by SUHAS at 01/10/2024 14:14 Reason:    Patient unavailable/dr nguyen pt   APTT    Narrative:     Collection has been rescheduled by SUHAS at 01/10/2024 14:14 Reason:   Patient unavailable/dr nguyen pt   PATHOLOGIST INTERPRETATION DIFFERENTIAL   ANTICARDIOLIPIN IGA   SEDIMENTATION RATE   BETA-2 GLYCOPROTEIN 1 AB IGA, IGG, IGM   BETA-2 GLYCOPROTEIN ANTIBODIES   LUPUS ANTICOAGULANT (DRVVT)   MAGNESIUM   PHOSPHORUS   POCT URINE PREGNANCY     EKG Readings: (Independently Interpreted)   Rhythm: Normal Sinus Rhythm. Heart Rate: 100. Ectopy: No Ectopy. Conduction: Normal. Clinical Impression: AV Block - 1st Degree   NSR: 1st degree AV block; no STEMI; no a-fib      ECG Results              EKG 12-lead (Final result)  Result time 01/10/24 16:37:11      Final result by Interface, Lab In [facility] (01/10/24 16:37:11)                   Narrative:    Test Reason : D73.5,    Vent. Rate : 100 BPM     Atrial Rate : 098 BPM     P-R Int : 000 ms          QRS Dur : 082 ms      QT Int : 420 ms       P-R-T Axes : 000 090 036 degrees     QTc Int : 541 ms    Normal sinus rhythm with 1st degree A-V block  Rightward axis  Septal infarct ,age undetermined  Nonspecific T wave abnormality  Nonspecific ST abnormality  Abnormal ECG  When compared with ECG of 18-DEC-2023 15:53,  rate is faster and there are nonspecific STT changes  Confirmed by Leroy Mancilla MD ([id number]) on 1/10/2024 4:37:01 PM    Referred By: [id number]   SELF           Confirmed By:Leroy Mancilla MD                                  Imaging Results              CTA Abdomen and Pelvis (Final result)  Result time 01/10/24 13:33:15      Final result by Eileen Wolfe MD (01/10/24 13:33:15)                   Impression:      Abnormal enhancement of the spleen concerning for areas of splenic infarction.  There is a trace of fluid in the subcapsular region of the inferior pole of the spleen, short-term follow-up advised, likely due to recent splenic areas of infarction.    Normal appearance of the aorta and its branches.  No  significant atherosclerotic plaque.      Electronically signed by: Eileen Wolfe MD  Date:    01/10/2024  Time:    13:33               Narrative:    EXAMINATION:  CTA ABDOMEN AND PELVIS    CLINICAL HISTORY:  Mesenteric ischemia, acute;Renal artery stenosis;    TECHNIQUE:  1.25 mm axial images of the abdominal aorta were obtained after the administration of 100 cc of Omni 350 IV contrast, coronal and sagittal images reformatted.    COMPARISON:  None    FINDINGS:  The lung bases are clear.  No pleural effusion.  No pericardial effusion.    The abdominal aorta tapers normally.  There is no significant atherosclerotic plaque.  The celiac trunk, SMA, main and accessory right renal artery, left renal artery are patent without stenosis.  There is no atherosclerotic plaque.  The bilateral renal arteries demonstrate no significant beaded appearance.  The HAYDEE is patent.  The iliac and femoral vessels are patent.    No liver lesions.  The gallbladder is present, no radiopaque stones seen within.  The biliary ducts are not dilated.    The distal esophagus and stomach appear normal.    The pancreas appears normal.  Irregular enhancement of the peripheral aspect of the spleen especially at the inferior pole concerning for areas of infarction.  There is a trace of subcapsular fluid contained adjacent to the inferior pole of the spleen.  The splenic vein is patent.  The splenic artery appears patent to the hilum region.    The bilateral adrenal glands and kidneys appear normal.  No hydronephrosis.  There is no contrast excretion by the kidneys likely due to the time of imaging.  There is no kidney mass.  The bilateral renal veins are patent.    Mild stool retention, no inflammatory changes of the bowel seen, no bowel dilation.  The appendix is normal.  No bowel wall thickening.    No free air.  Trace of free fluid in the pelvis.    The abdominal wall is intact.  The inguinal regions appear normal.    The bladder is  nondistended.  The uterus and ovaries demonstrate nothing unusual.    The osseous structures demonstrate no osseous lesions.                                       Medications   aspirin chewable tablet 81 mg (has no administration in time range)   sodium chloride 0.9% flush 5 mL (has no administration in time range)   melatonin tablet 6 mg (has no administration in time range)   ondansetron injection 4 mg (has no administration in time range)   senna-docusate 8.6-50 mg per tablet 1 tablet (has no administration in time range)   acetaminophen tablet 650 mg (has no administration in time range)   naloxone 0.4 mg/mL injection 0.02 mg (has no administration in time range)   glucose chewable tablet 16 g (has no administration in time range)   glucose chewable tablet 24 g (has no administration in time range)   glucagon (human recombinant) injection 1 mg (has no administration in time range)   dextrose 10% bolus 125 mL 125 mL (has no administration in time range)   dextrose 10% bolus 250 mL 250 mL (has no administration in time range)   oxyCODONE-acetaminophen 5-325 mg per tablet 1 tablet (has no administration in time range)   enoxaparin injection 70 mg (has no administration in time range)   atorvastatin tablet 40 mg (has no administration in time range)   0.9%  NaCl infusion (0 mLs Intravenous Stopped 1/10/24 1402)   morphine injection 4 mg (4 mg Intravenous Given 1/10/24 1158)   ondansetron injection 4 mg (4 mg Intravenous Given 1/10/24 1157)   iohexoL (OMNIPAQUE 350) injection 100 mL (100 mLs Intravenous Given 1/10/24 1226)     Medical Decision Making  Amount and/or Complexity of Data Reviewed  Labs: ordered.  Radiology: ordered.    Risk  Prescription drug management.               ED Course as of 01/10/24 1713   Wed Addi 10, 2024   1404 Per chart check, patient with blood clotting disorder; she was evaluated by Dr. Wing Watts in late 2023.  [LC]   1405 B2 Glycoprotein IgM pos in past - antiphospholipid disorder [LC]    1436 Case discussed with the on-call hematologist oncologist, Dr. Tate, who recommends admission for pain control, likely anticoagulation; will place formal consult to Hematology Oncology. [LC]   1437 Case discussed with the [facility] resident who will admit patient to his service. [LC]   1437 Discussed case with the on-call general surgeon, Dr. Villatoro, I feel the patient merited acute surgical intervention this time. [LC]      ED Course User Index  [LC] Arron Ewing MD               Medical Decision Making:   Initial Assessment:   See HPI  Will obtain basic labs, imaging, pain control   Clinical Tests:   Lab Tests: Reviewed and Ordered  Radiological Study: Ordered and Reviewed  ED Management:  - laboratory analysis largely unremarkable; patient very tender to palpation to the left upper and left lower quadrant of the abdomen; in light of her embolic history I did decide to obtain a CTA of the abdomen pelvis which demonstrates findings concerning for splenic infarcts; I discussed case with the on-call general surgeon, Dr. Villatoro, indicate any need for emergent surgical intervention. I discussed the case with the on call hematologist/oncologist, Dr. Tate, via phone,  who recommends admission for likely anticoagulation.  I have placed a inpatient consult to Heme-Onc per his request. 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

## 2024-01-10 NOTE — SUBJECTIVE & OBJECTIVE
Past Medical History:   Diagnosis Date    HLD (hyperlipidemia) 2023    Stroke        Past Surgical History:   Procedure Laterality Date    BREAST BIOPSY Right     excisional benign    BUNIONECTOMY       SECTION      x 2    TONSILLECTOMY      TRANSESOPHAGEAL ECHOCARDIOGRAPHY N/A 2023    TRANSESOPHAGEAL ECHOCARDIOGRAPHY N/A 2023    Procedure: ECHOCARDIOGRAM, TRANSESOPHAGEAL;  Surgeon: Wing Yeung MD;  Location: [facility] CATH LAB;  Service: Cardiology;  Laterality: N/A;       Review of patient's allergies indicates:  No Known Allergies    Current Facility-Administered Medications on File Prior to Encounter   Medication    0.9%  NaCl infusion     Current Outpatient Medications on File Prior to Encounter   Medication Sig    aspirin 81 MG Chew Chew and swallow 1 tablet (81 mg total) by mouth once daily.     Family History       Problem Relation (Age of Onset)    ALS Mother    Breast cancer Maternal Grandmother    Coronary artery disease Father    Diabetes Mother    Heart attack Father    Hypertension Mother    No Known Problems Sister, Brother          Tobacco Use    Smoking status: Never    Smokeless tobacco: Never   Substance and Sexual Activity    Alcohol use: Not Currently    Drug use: Never    Sexual activity: Yes     Partners: Male     Birth control/protection: Coitus interruptus     Review of Systems   Constitutional:  Negative for chills, fatigue and fever.   Respiratory:  Negative for shortness of breath.    Cardiovascular:  Negative for chest pain, palpitations and leg swelling.   Gastrointestinal:  Positive for abdominal pain (LUQ, LLQ). Negative for diarrhea, nausea and vomiting.   Genitourinary:  Negative for dysuria, hematuria and pelvic pain.   Musculoskeletal:  Positive for back pain (left flank).   Skin:  Negative for pallor and rash.   Neurological:  Negative for dizziness and headaches.   All other systems reviewed and are negative.    Objective:     Vital Signs (Most  Recent):  Temp: 97.9 °F (36.6 °C) (01/10/24 1018)  Pulse: 91 (01/10/24 1527)  Resp: 18 (01/10/24 1527)  BP: 117/74 (01/10/24 1527)  SpO2: 98 % (01/10/24 1527) Vital Signs (24h Range):  Temp:  [97.9 °F (36.6 °C)] 97.9 °F (36.6 °C)  Pulse:  [18-91] 91  Resp:  [16-20] 18  SpO2:  [98 %-99 %] 98 %  BP: (111-117)/(61-74) 117/74     Weight: 65.8 kg (145 lb)  Body mass index is 24.89 kg/m².     Physical Exam  Vitals reviewed.   Constitutional:       General: She is not in acute distress.     Appearance: Normal appearance. She is not ill-appearing, toxic-appearing or diaphoretic.   HENT:      Head: Normocephalic and atraumatic.      Nose: No congestion or rhinorrhea.      Mouth/Throat:      Pharynx: No oropharyngeal exudate or posterior oropharyngeal erythema.   Eyes:      General: No scleral icterus.     Extraocular Movements: Extraocular movements intact.      Pupils: Pupils are equal, round, and reactive to light.   Cardiovascular:      Rate and Rhythm: Normal rate and regular rhythm.      Heart sounds: Normal heart sounds. No murmur heard.     No friction rub. No gallop.   Pulmonary:      Effort: No respiratory distress.      Breath sounds: No stridor. No wheezing, rhonchi or rales.   Abdominal:      General: Abdomen is flat. Bowel sounds are normal. There is no distension.      Palpations: There is no mass.      Tenderness: There is abdominal tenderness (LUQ, LLQ). There is left CVA tenderness. There is no guarding.      Hernia: No hernia is present.   Musculoskeletal:      Cervical back: Normal range of motion.      Right lower leg: No edema.      Left lower leg: No edema.   Skin:     Coloration: Skin is not jaundiced.      Findings: No bruising or rash.   Neurological:      General: No focal deficit present.      Mental Status: She is alert and oriented to person, place, and time.              CRANIAL NERVES     CN III, IV, VI   Pupils are equal, round, and reactive to light.       Significant Labs: All pertinent labs  within the past 24 hours have been reviewed.  CBC:   Recent Labs   Lab 01/10/24  1052 01/10/24  1631   WBC 8.89 7.49   HGB 13.1 12.1   HCT 40.0 35.4*    296     CMP:   Recent Labs   Lab 01/10/24  1052   *   K 5.1      CO2 18*   GLU 94   BUN 7   CREATININE 0.7   CALCIUM 10.0   PROT 8.3   ALBUMIN 3.9   BILITOT 0.4   ALKPHOS 58   AST 27   ALT 15   ANIONGAP 11       Significant Imaging: I have reviewed all pertinent imaging results/findings within the past 24 hours.  CT: I have reviewed all pertinent results/findings within the past 24 hours and my personal findings are:  CTA abdomen pelvis with evidence of splenic infarct

## 2024-01-10 NOTE — HPI
Patient is a 44 y.o.-year-old female w/ PMHx CVA in September 23 of this year, currently seeing heme/onc (Dr. Watts) for workup for APLS who presents to the ED with reports of abdominal pain since Sarbjit night. The pain started with a left upper quadrant abdominal pain which started radiating to left lower quadrant and now left flank pain. She denies any pain associated with meals, she denies any change in diet. She denies fevers, chills, headache, chest pain, shortness of breath. She denies constipation, diarrhea, hematuria, bloody stools. She has a history of 2 C-sections and denies any abnormal bleeding or clotting that she's noticed. She denies any abnormalities in her menstrual cycle. She denies any family history of blood disorders or clotting disorders.      In the ED, initial vitals Temp 97.9F, /70, HR 86, RR 18, SpO2 98% on room air. CBC was WNL, CMP significant for hyponatremia of 130, CO2 of 18 Estimated Creatinine Clearance: 95.7 mL/min (based on SCr of 0.7 mg/dL).  CTA abdomen and pelvis showed abnormal enhancement of splenic region, concerning for splenic infarct. Trace fluid in subcapsular region of spleen and patent splenic artery and vein. EKG showed accelerated junctional rhythm, a prolonged QTc of 541.   In the ED patient was treated with morphine, zofran, 1L NS bolus. [facility] admitted patient for splenic infarct/APLS workup

## 2024-01-10 NOTE — ASSESSMENT & PLAN NOTE
Results for orders placed or performed during the hospital encounter of 01/10/24   EKG 12-lead    Narrative    Test Reason : D73.5,    Vent. Rate : 100 BPM     Atrial Rate : 098 BPM     P-R Int : 000 ms          QRS Dur : 082 ms      QT Int : 420 ms       P-R-T Axes : 000 090 036 degrees     QTc Int : 541 ms    Normal sinus rhythm with 1st degree A-V block  Rightward axis  Septal infarct ,age undetermined  Nonspecific T wave abnormality  Nonspecific ST abnormality  Abnormal ECG  When compared with ECG of 18-DEC-2023 15:53,  rate is faster and there are nonspecific STT changes  Confirmed by Leroy Mancilla MD ([id number]) on 1/10/2024 4:37:01 PM    Referred By: [email or web address]   SELF           Confirmed By:Leroy Mancilla MD     On admit patient with no chest pain, very prolonged QTc of 541    Plan:  Repeat EKG  Mag, Phos, K, replete PRN  Monitor on tele

## 2024-01-10 NOTE — H&P
Family Medicine  History & Physical    Patient Name: Rhonda Ahuja  MRN: [id number]  Patient Class: OP- Observation  Admission Date: 1/10/2024  Attending Physician: Gem Reilly MD   Primary Care Provider: Rm Simpson MD         Patient information was obtained from patient, spouse/SO, and ER records.     Subjective:     Principal Problem:Splenic infarct    Chief Complaint:   Chief Complaint   Patient presents with    Abdominal Pain     Pt presents to ED with c/o abdominal since Monday with flack pain. Pt went to urgent care yesterday and urine result WNL.         HPI: Patient is a 44 y.o.-year-old female w/ PMHx CVA in September 23 of this year, currently seeing heme/onc (Dr. Watts) for workup for APLS who presents to the ED with reports of abdominal pain since Sunday night. The pain started with a left upper quadrant abdominal pain which started radiating to left lower quadrant and now left flank pain. She denies any pain associated with meals, she denies any change in diet. She denies fevers, chills, headache, chest pain, shortness of breath. She denies constipation, diarrhea, hematuria, bloody stools. She has a history of 2 C-sections and denies any abnormal bleeding or clotting that she's noticed. She denies any abnormalities in her menstrual cycle. She denies any family history of blood disorders or clotting disorders.      In the ED, initial vitals Temp 97.9F, /70, HR 86, RR 18, SpO2 98% on room air. CBC was WNL, CMP significant for hyponatremia of 130, CO2 of 18 Estimated Creatinine Clearance: 95.7 mL/min (based on SCr of 0.7 mg/dL).  CTA abdomen and pelvis showed abnormal enhancement of splenic region, concerning for splenic infarct. Trace fluid in subcapsular region of spleen and patent splenic artery and vein. EKG showed accelerated junctional rhythm, a prolonged QTc of 541.   In the ED patient was treated with morphine, zofran, 1L NS bolus. [facility] admitted patient for  splenic infarct/APLS workup    Past Medical History:   Diagnosis Date    HLD (hyperlipidemia) 2023    Stroke        Past Surgical History:   Procedure Laterality Date    BREAST BIOPSY Right     excisional benign    BUNIONECTOMY       SECTION      x 2    TONSILLECTOMY      TRANSESOPHAGEAL ECHOCARDIOGRAPHY N/A 2023    TRANSESOPHAGEAL ECHOCARDIOGRAPHY N/A 2023    Procedure: ECHOCARDIOGRAM, TRANSESOPHAGEAL;  Surgeon: Wing Yeung MD;  Location: [facility] CATH LAB;  Service: Cardiology;  Laterality: N/A;       Review of patient's allergies indicates:  No Known Allergies    Current Facility-Administered Medications on File Prior to Encounter   Medication    0.9%  NaCl infusion     Current Outpatient Medications on File Prior to Encounter   Medication Sig    aspirin 81 MG Chew Chew and swallow 1 tablet (81 mg total) by mouth once daily.     Family History       Problem Relation (Age of Onset)    ALS Mother    Breast cancer Maternal Grandmother    Coronary artery disease Father    Diabetes Mother    Heart attack Father    Hypertension Mother    No Known Problems Sister, Brother          Tobacco Use    Smoking status: Never    Smokeless tobacco: Never   Substance and Sexual Activity    Alcohol use: Not Currently    Drug use: Never    Sexual activity: Yes     Partners: Male     Birth control/protection: Coitus interruptus     Review of Systems   Constitutional:  Negative for chills, fatigue and fever.   Respiratory:  Negative for shortness of breath.    Cardiovascular:  Negative for chest pain, palpitations and leg swelling.   Gastrointestinal:  Positive for abdominal pain (LUQ, LLQ). Negative for diarrhea, nausea and vomiting.   Genitourinary:  Negative for dysuria, hematuria and pelvic pain.   Musculoskeletal:  Positive for back pain (left flank).   Skin:  Negative for pallor and rash.   Neurological:  Negative for dizziness and headaches.   All other systems reviewed and are negative.    Objective:      Vital Signs (Most Recent):  Temp: 97.9 °F (36.6 °C) (01/10/24 1018)  Pulse: 91 (01/10/24 1527)  Resp: 18 (01/10/24 1527)  BP: 117/74 (01/10/24 1527)  SpO2: 98 % (01/10/24 1527) Vital Signs (24h Range):  Temp:  [97.9 °F (36.6 °C)] 97.9 °F (36.6 °C)  Pulse:  [18-91] 91  Resp:  [16-20] 18  SpO2:  [98 %-99 %] 98 %  BP: (111-117)/(61-74) 117/74     Weight: 65.8 kg (145 lb)  Body mass index is 24.89 kg/m².     Physical Exam  Vitals reviewed.   Constitutional:       General: She is not in acute distress.     Appearance: Normal appearance. She is not ill-appearing, toxic-appearing or diaphoretic.   HENT:      Head: Normocephalic and atraumatic.      Nose: No congestion or rhinorrhea.      Mouth/Throat:      Pharynx: No oropharyngeal exudate or posterior oropharyngeal erythema.   Eyes:      General: No scleral icterus.     Extraocular Movements: Extraocular movements intact.      Pupils: Pupils are equal, round, and reactive to light.   Cardiovascular:      Rate and Rhythm: Normal rate and regular rhythm.      Heart sounds: Normal heart sounds. No murmur heard.     No friction rub. No gallop.   Pulmonary:      Effort: No respiratory distress.      Breath sounds: No stridor. No wheezing, rhonchi or rales.   Abdominal:      General: Abdomen is flat. Bowel sounds are normal. There is no distension.      Palpations: There is no mass.      Tenderness: There is abdominal tenderness (LUQ, LLQ). There is left CVA tenderness. There is no guarding.      Hernia: No hernia is present.   Musculoskeletal:      Cervical back: Normal range of motion.      Right lower leg: No edema.      Left lower leg: No edema.   Skin:     Coloration: Skin is not jaundiced.      Findings: No bruising or rash.   Neurological:      General: No focal deficit present.      Mental Status: She is alert and oriented to person, place, and time.              CRANIAL NERVES     CN III, IV, VI   Pupils are equal, round, and reactive to light.       Significant  Labs: All pertinent labs within the past 24 hours have been reviewed.  CBC:   Recent Labs   Lab 01/10/24  1052 01/10/24  1631   WBC 8.89 7.49   HGB 13.1 12.1   HCT 40.0 35.4*    296     CMP:   Recent Labs   Lab 01/10/24  1052   *   K 5.1      CO2 18*   GLU 94   BUN 7   CREATININE 0.7   CALCIUM 10.0   PROT 8.3   ALBUMIN 3.9   BILITOT 0.4   ALKPHOS 58   AST 27   ALT 15   ANIONGAP 11       Significant Imaging: I have reviewed all pertinent imaging results/findings within the past 24 hours.  CT: I have reviewed all pertinent results/findings within the past 24 hours and my personal findings are:  CTA abdomen pelvis with evidence of splenic infarct  Assessment/Plan:     * Splenic infarct  Patient with history of LUQ and LLQ pain radiating to left flank. Currently being worked up by heme/onc for APLS. CTA abdomen pelvis with evidence of splenic infarct    Plan:  ASA  Therapeutic lovenox  Consult heme/onc  Tylenol for mild pain, norco 5 for moderate pain  Zofran PRN for nausea  F/u PTT/INR  F/u cardiolipin, DRVVT, beta 2 glycoprotein  F/u ESR/CRP      Prolonged Q-T interval on ECG  Results for orders placed or performed during the hospital encounter of 01/10/24   EKG 12-lead    Narrative    Test Reason : D73.5,    Vent. Rate : 100 BPM     Atrial Rate : 098 BPM     P-R Int : 000 ms          QRS Dur : 082 ms      QT Int : 420 ms       P-R-T Axes : 000 090 036 degrees     QTc Int : 541 ms    Normal sinus rhythm with 1st degree A-V block  Rightward axis  Septal infarct ,age undetermined  Nonspecific T wave abnormality  Nonspecific ST abnormality  Abnormal ECG  When compared with ECG of 18-DEC-2023 15:53,  rate is faster and there are nonspecific STT changes  Confirmed by Leroy Mancilla MD ([id number]) on 1/10/2024 4:37:01 PM    Referred By: [email or web address]   SELF           Confirmed By:Leroy Mancilla MD     On admit patient with no chest pain, very prolonged QTc of 541    Plan:  Repeat EKG  Mag, Phos, K, replete  PRN  Monitor on tele    History of cerebrovascular accident (CVA) due to embolism  Patient with history of CVA in September, no current headache or neurological symptoms. Being worked up for APLS    Plan:  ASA, statin      HLD (hyperlipidemia)  Lab Results   Component Value Date    CHOL 222 (H) 12/18/2023    CHOL 277 (H) 09/23/2023     Lab Results   Component Value Date    HDL 54 12/18/2023    HDL 55 09/23/2023     Lab Results   Component Value Date    LDLCALC 155.6 12/18/2023    LDLCALC 200.0 (H) 09/23/2023     Lab Results   Component Value Date    TRIG 62 12/18/2023    TRIG 110 09/23/2023       Lab Results   Component Value Date    CHOLHDL 24.3 12/18/2023    CHOLHDL 19.9 (L) 09/23/2023     Patient with history of hyperlipemia and history of stroke    Plan:  Atorvastatin 40        VTE Risk Mitigation (From admission, onward)           Ordered     enoxaparin injection 70 mg  Every 12 hours         01/10/24 1622     Reason for No Pharmacological VTE Prophylaxis  Once        Question:  Reasons:  Answer:  Physician Provided (leave comment)  Comment:  will do anticoagulation    01/10/24 1559     IP VTE HIGH RISK PATIENT  Once         01/10/24 1559     Place sequential compression device  Until discontinued         01/10/24 1559                           On 01/10/2024, patient should be placed in hospital observation services under my care in collaboration with Dr. Reilly.      Hugh Arguello MD  [facility], PGY-1  01/10/2024

## 2024-01-10 NOTE — ASSESSMENT & PLAN NOTE
Patient with history of LUQ and LLQ pain radiating to left flank. Currently being worked up by heme/onc for APLS. CTA abdomen pelvis with evidence of splenic infarct    Plan:  ASA  Therapeutic lovenox  Consult heme/onc  Tylenol for mild pain, norco 5 for moderate pain  Zofran PRN for nausea  F/u PTT/INR  F/u cardiolipin, DRVVT, beta 2 glycoprotein  F/u ESR/CRP

## 2024-01-10 NOTE — PHARMACY MED REC
"[facility]           Pharmacy  Admission Medication History     The home medication history was taken by Dionne Jang.      Medication history obtained from Medications listed below were obtained from: Patient/family    Based on information gathered for medication list, you may go to "Admission" then "Reconcile Home Medications" tabs to review and/or act upon those items.     The home medication list has been updated by the Pharmacy department.   Please read ALL comments highlighted in yellow.   Please address this information as you see fit.    Feel free to contact us if you have any questions or require assistance.        Current Facility-Administered Medications on File Prior to Encounter   Medication Dose Route Frequency Provider Last Rate Last Admin    0.9%  NaCl infusion   Intravenous Continuous Wing Yeung MD         Current Outpatient Medications on File Prior to Encounter   Medication Sig Dispense Refill    aspirin 81 MG Chew Chew and swallow 1 tablet (81 mg total) by mouth once daily. 90 tablet 3       Please address this information as you see fit.  Feel free to contact us if you have any questions or require assistance.    Dionne Jang  [phone]                  .          "

## 2024-01-11 PROBLEM — D68.61 ANTIPHOSPHOLIPID SYNDROME: Status: ACTIVE | Noted: 2024-01-11

## 2024-01-11 LAB
ALBUMIN SERPL BCP-MCNC: 3.2 G/DL (ref 3.5–5.2)
ALP SERPL-CCNC: 51 U/L (ref 55–135)
ALT SERPL W/O P-5'-P-CCNC: 9 U/L (ref 10–44)
ANION GAP SERPL CALC-SCNC: 5 MMOL/L (ref 8–16)
AST SERPL-CCNC: 13 U/L (ref 10–40)
BASOPHILS # BLD AUTO: 0.07 K/UL (ref 0–0.2)
BASOPHILS # BLD AUTO: 0.08 K/UL (ref 0–0.2)
BASOPHILS NFR BLD: 0.9 % (ref 0–1.9)
BASOPHILS NFR BLD: 1.1 % (ref 0–1.9)
BILIRUB SERPL-MCNC: 0.2 MG/DL (ref 0.1–1)
BUN SERPL-MCNC: 12 MG/DL (ref 6–20)
CALCIUM SERPL-MCNC: 9.2 MG/DL (ref 8.7–10.5)
CHLORIDE SERPL-SCNC: 109 MMOL/L (ref 95–110)
CO2 SERPL-SCNC: 24 MMOL/L (ref 23–29)
CREAT SERPL-MCNC: 0.7 MG/DL (ref 0.5–1.4)
DIFFERENTIAL METHOD BLD: ABNORMAL
DIFFERENTIAL METHOD BLD: ABNORMAL
EOSINOPHIL # BLD AUTO: 0.1 K/UL (ref 0–0.5)
EOSINOPHIL # BLD AUTO: 0.1 K/UL (ref 0–0.5)
EOSINOPHIL NFR BLD: 0.6 % (ref 0–8)
EOSINOPHIL NFR BLD: 2.1 % (ref 0–8)
ERYTHROCYTE [DISTWIDTH] IN BLOOD BY AUTOMATED COUNT: 11.9 % (ref 11.5–14.5)
ERYTHROCYTE [DISTWIDTH] IN BLOOD BY AUTOMATED COUNT: 12 % (ref 11.5–14.5)
EST. GFR  (NO RACE VARIABLE): >60 ML/MIN/1.73 M^2
GLUCOSE SERPL-MCNC: 102 MG/DL (ref 70–110)
HCT VFR BLD AUTO: 33.3 % (ref 37–48.5)
HCT VFR BLD AUTO: 33.8 % (ref 37–48.5)
HGB BLD-MCNC: 11.2 G/DL (ref 12–16)
HGB BLD-MCNC: 11.4 G/DL (ref 12–16)
IMM GRANULOCYTES # BLD AUTO: 0.01 K/UL (ref 0–0.04)
IMM GRANULOCYTES # BLD AUTO: 0.03 K/UL (ref 0–0.04)
IMM GRANULOCYTES NFR BLD AUTO: 0.2 % (ref 0–0.5)
IMM GRANULOCYTES NFR BLD AUTO: 0.4 % (ref 0–0.5)
INR PPP: 1 (ref 0.8–1.2)
LYMPHOCYTES # BLD AUTO: 1.3 K/UL (ref 1–4.8)
LYMPHOCYTES # BLD AUTO: 1.7 K/UL (ref 1–4.8)
LYMPHOCYTES NFR BLD: 15.7 % (ref 18–48)
LYMPHOCYTES NFR BLD: 27.5 % (ref 18–48)
MAGNESIUM SERPL-MCNC: 2.1 MG/DL (ref 1.6–2.6)
MCH RBC QN AUTO: 31 PG (ref 27–31)
MCH RBC QN AUTO: 31.1 PG (ref 27–31)
MCHC RBC AUTO-ENTMCNC: 33.6 G/DL (ref 32–36)
MCHC RBC AUTO-ENTMCNC: 33.7 G/DL (ref 32–36)
MCV RBC AUTO: 92 FL (ref 82–98)
MCV RBC AUTO: 92 FL (ref 82–98)
MONOCYTES # BLD AUTO: 0.6 K/UL (ref 0.3–1)
MONOCYTES # BLD AUTO: 0.8 K/UL (ref 0.3–1)
MONOCYTES NFR BLD: 12.7 % (ref 4–15)
MONOCYTES NFR BLD: 7 % (ref 4–15)
NEUTROPHILS # BLD AUTO: 3.5 K/UL (ref 1.8–7.7)
NEUTROPHILS # BLD AUTO: 6.5 K/UL (ref 1.8–7.7)
NEUTROPHILS NFR BLD: 56.4 % (ref 38–73)
NEUTROPHILS NFR BLD: 75.4 % (ref 38–73)
NRBC BLD-RTO: 0 /100 WBC
NRBC BLD-RTO: 0 /100 WBC
PATH REV BLD -IMP: NORMAL
PHOSPHATE SERPL-MCNC: 2.9 MG/DL (ref 2.7–4.5)
PLATELET # BLD AUTO: 269 K/UL (ref 150–450)
PLATELET # BLD AUTO: 272 K/UL (ref 150–450)
PMV BLD AUTO: 9.5 FL (ref 9.2–12.9)
PMV BLD AUTO: 9.6 FL (ref 9.2–12.9)
POTASSIUM SERPL-SCNC: 4.6 MMOL/L (ref 3.5–5.1)
PROT SERPL-MCNC: 6.5 G/DL (ref 6–8.4)
PROTHROMBIN TIME: 10.5 SEC (ref 9–12.5)
RBC # BLD AUTO: 3.61 M/UL (ref 4–5.4)
RBC # BLD AUTO: 3.66 M/UL (ref 4–5.4)
SODIUM SERPL-SCNC: 138 MMOL/L (ref 136–145)
WBC # BLD AUTO: 6.14 K/UL (ref 3.9–12.7)
WBC # BLD AUTO: 8.55 K/UL (ref 3.9–12.7)

## 2024-01-11 PROCEDURE — 96375 TX/PRO/DX INJ NEW DRUG ADDON: CPT

## 2024-01-11 PROCEDURE — G0378 HOSPITAL OBSERVATION PER HR: HCPCS

## 2024-01-11 PROCEDURE — 11000001 HC ACUTE MED/SURG PRIVATE ROOM

## 2024-01-11 PROCEDURE — 83735 ASSAY OF MAGNESIUM: CPT

## 2024-01-11 PROCEDURE — 63600175 PHARM REV CODE 636 W HCPCS: Performed by: STUDENT IN AN ORGANIZED HEALTH CARE EDUCATION/TRAINING PROGRAM

## 2024-01-11 PROCEDURE — 96372 THER/PROPH/DIAG INJ SC/IM: CPT | Performed by: STUDENT IN AN ORGANIZED HEALTH CARE EDUCATION/TRAINING PROGRAM

## 2024-01-11 PROCEDURE — 25000003 PHARM REV CODE 250: Performed by: STUDENT IN AN ORGANIZED HEALTH CARE EDUCATION/TRAINING PROGRAM

## 2024-01-11 PROCEDURE — 96361 HYDRATE IV INFUSION ADD-ON: CPT

## 2024-01-11 PROCEDURE — 84100 ASSAY OF PHOSPHORUS: CPT

## 2024-01-11 PROCEDURE — 63600175 PHARM REV CODE 636 W HCPCS

## 2024-01-11 PROCEDURE — 99215 OFFICE O/P EST HI 40 MIN: CPT | Mod: ,,, | Performed by: INTERNAL MEDICINE

## 2024-01-11 PROCEDURE — 96376 TX/PRO/DX INJ SAME DRUG ADON: CPT

## 2024-01-11 PROCEDURE — 25000003 PHARM REV CODE 250

## 2024-01-11 PROCEDURE — A4216 STERILE WATER/SALINE, 10 ML: HCPCS

## 2024-01-11 PROCEDURE — 85025 COMPLETE CBC W/AUTO DIFF WBC: CPT

## 2024-01-11 PROCEDURE — 85610 PROTHROMBIN TIME: CPT

## 2024-01-11 PROCEDURE — 80053 COMPREHEN METABOLIC PANEL: CPT

## 2024-01-11 PROCEDURE — 85025 COMPLETE CBC W/AUTO DIFF WBC: CPT | Mod: 91 | Performed by: STUDENT IN AN ORGANIZED HEALTH CARE EDUCATION/TRAINING PROGRAM

## 2024-01-11 RX ORDER — KETOROLAC TROMETHAMINE 30 MG/ML
15 INJECTION, SOLUTION INTRAMUSCULAR; INTRAVENOUS ONCE
Status: COMPLETED | OUTPATIENT
Start: 2024-01-11 | End: 2024-01-11

## 2024-01-11 RX ORDER — ENOXAPARIN SODIUM 100 MG/ML
1 INJECTION SUBCUTANEOUS EVERY 12 HOURS
Status: DISCONTINUED | OUTPATIENT
Start: 2024-01-11 | End: 2024-01-17 | Stop reason: HOSPADM

## 2024-01-11 RX ORDER — HYDROMORPHONE HYDROCHLORIDE 1 MG/ML
1 INJECTION, SOLUTION INTRAMUSCULAR; INTRAVENOUS; SUBCUTANEOUS ONCE
Status: COMPLETED | OUTPATIENT
Start: 2024-01-11 | End: 2024-01-11

## 2024-01-11 RX ADMIN — OXYCODONE HYDROCHLORIDE AND ACETAMINOPHEN 1 TABLET: 5; 325 TABLET ORAL at 02:01

## 2024-01-11 RX ADMIN — KETOROLAC TROMETHAMINE 15 MG: 30 INJECTION, SOLUTION INTRAMUSCULAR at 04:01

## 2024-01-11 RX ADMIN — ENOXAPARIN SODIUM 70 MG: 100 INJECTION SUBCUTANEOUS at 04:01

## 2024-01-11 RX ADMIN — SODIUM CHLORIDE 500 ML: 9 INJECTION, SOLUTION INTRAVENOUS at 04:01

## 2024-01-11 RX ADMIN — HYDROMORPHONE HYDROCHLORIDE 1 MG: 1 INJECTION, SOLUTION INTRAMUSCULAR; INTRAVENOUS; SUBCUTANEOUS at 09:01

## 2024-01-11 RX ADMIN — WARFARIN SODIUM 7.5 MG: 5 TABLET ORAL at 04:01

## 2024-01-11 RX ADMIN — OXYCODONE HYDROCHLORIDE AND ACETAMINOPHEN 1 TABLET: 5; 325 TABLET ORAL at 08:01

## 2024-01-11 RX ADMIN — OXYCODONE HYDROCHLORIDE AND ACETAMINOPHEN 1 TABLET: 5; 325 TABLET ORAL at 04:01

## 2024-01-11 RX ADMIN — ONDANSETRON 4 MG: 2 INJECTION INTRAMUSCULAR; INTRAVENOUS at 09:01

## 2024-01-11 RX ADMIN — ACETAMINOPHEN 650 MG: 325 TABLET ORAL at 04:01

## 2024-01-11 RX ADMIN — Medication 5 ML: at 04:01

## 2024-01-11 RX ADMIN — ASPIRIN 81 MG CHEWABLE TABLET 81 MG: 81 TABLET CHEWABLE at 09:01

## 2024-01-11 NOTE — NURSING
Pt. To unit. VSS. Pt. Oriented to call light and room. Dr. Arguello notified of Pt. Arrival to unit. Instructed to call for assistance. Bed alarm on and call light in reach. Pt. Instructed to call for assistance. Plan of care continued.

## 2024-01-11 NOTE — ASSESSMENT & PLAN NOTE
- CTA abdomen (1/10/24) revealed a splenic infarct  - given her history of miscarriage, stroke, and positive beta-2 glycoprotein antibody (don't have confirmatory lab testing yet though), I feel comfortable stating she has a hypercoagulable state, most likely antiphospholipid syndrome.  - she is receiving enoxaparin right now in the hospital. Recommend transition to warfarin. Recommend referral (outpatient) to coumadin monitoring clinic at time of discharge.  - I will arrange for a follow-up appointment.

## 2024-01-11 NOTE — HPI
44 year-old female with history of stroke was admitted on 1/10/24 for splenic infarct. Consult is for possible antiphospholipid syndrome.

## 2024-01-11 NOTE — SUBJECTIVE & OBJECTIVE
Interval History: patient still endorsing left sided abdominal and flank pain. No fevers, chills, nausea, vomiting. She stated that she had two transient headaches yesterday night.    Review of Systems   Constitutional:  Negative for chills, fatigue and fever.   Respiratory:  Negative for shortness of breath.    Cardiovascular:  Negative for chest pain, palpitations and leg swelling.   Gastrointestinal:  Positive for abdominal pain (LUQ, LLQ). Negative for diarrhea, nausea and vomiting.   Genitourinary:  Negative for dysuria, hematuria and pelvic pain.   Musculoskeletal:  Positive for back pain (left flank).   Skin:  Negative for pallor and rash.   Neurological:  Negative for dizziness and headaches.   All other systems reviewed and are negative.    Objective:     Vital Signs (Most Recent):  Temp: 98.7 °F (37.1 °C) (01/11/24 1000)  Pulse: 80 (01/11/24 1002)  Resp: 15 (01/11/24 1002)  BP: (!) 108/55 (01/11/24 1002)  SpO2: 97 % (01/11/24 1002) Vital Signs (24h Range):  Temp:  [97.9 °F (36.6 °C)-98.7 °F (37.1 °C)] 98.7 °F (37.1 °C)  Pulse:  [69-95] 80  Resp:  [13-34] 15  SpO2:  [96 %-99 %] 97 %  BP: ()/(49-74) 108/55     Weight: 65.8 kg (145 lb)  Body mass index is 24.89 kg/m².    Intake/Output Summary (Last 24 hours) at 1/11/2024 1159  Last data filed at 1/11/2024 0710  Gross per 24 hour   Intake 2023.4 ml   Output 1 ml   Net 2022.4 ml         Physical Exam  Vitals reviewed.   Constitutional:       General: She is not in acute distress.     Appearance: Normal appearance. She is not ill-appearing, toxic-appearing or diaphoretic.   HENT:      Head: Normocephalic and atraumatic.      Nose: No congestion or rhinorrhea.      Mouth/Throat:      Pharynx: No oropharyngeal exudate or posterior oropharyngeal erythema.   Eyes:      General: No scleral icterus.     Extraocular Movements: Extraocular movements intact.      Pupils: Pupils are equal, round, and reactive to light.   Cardiovascular:      Rate and Rhythm: Normal  rate and regular rhythm.      Heart sounds: Normal heart sounds. No murmur heard.     No friction rub. No gallop.   Pulmonary:      Effort: No respiratory distress.      Breath sounds: No stridor. No wheezing, rhonchi or rales.   Abdominal:      General: Abdomen is flat. Bowel sounds are normal. There is no distension.      Palpations: There is no mass.      Tenderness: There is abdominal tenderness (LUQ, LLQ). There is left CVA tenderness. There is no guarding.      Hernia: No hernia is present.   Musculoskeletal:      Cervical back: Normal range of motion.      Right lower leg: No edema.      Left lower leg: No edema.   Skin:     Coloration: Skin is not jaundiced.      Findings: No bruising or rash.   Neurological:      General: No focal deficit present.      Mental Status: She is alert and oriented to person, place, and time.             Significant Labs: All pertinent labs within the past 24 hours have been reviewed.  CBC:   Recent Labs   Lab 01/10/24  1052 01/10/24  1631 01/11/24  0510   WBC 8.89 7.49 6.14   HGB 13.1 12.1 11.2*   HCT 40.0 35.4* 33.3*    296 272     CMP:   Recent Labs   Lab 01/10/24  1052 01/11/24  0510   * 138   K 5.1 4.6    109   CO2 18* 24   GLU 94 102   BUN 7 12   CREATININE 0.7 0.7   CALCIUM 10.0 9.2   PROT 8.3 6.5   ALBUMIN 3.9 3.2*   BILITOT 0.4 0.2   ALKPHOS 58 51*   AST 27 13   ALT 15 9*   ANIONGAP 11 5*       Significant Imaging: I have reviewed all pertinent imaging results/findings within the past 24 hours.

## 2024-01-11 NOTE — PROGRESS NOTES
Family Medicine  Progress Note    Patient Name: Rhonda Ahuja  MRN: [id number]  Patient Class: OP- Observation   Admission Date: 1/10/2024  Length of Stay: 0 days  Attending Physician: Dr. Buckley  Primary Care Provider: Rm Simpson MD        Subjective:     Principal Problem:Splenic infarct    HPI:  Patient is a 44 y.o.-year-old female w/ PMHx CVA in September 23 of this year, currently seeing heme/onc (Dr. Watts) for workup for APLS who presents to the ED with reports of abdominal pain since Sarbjit night. The pain started with a left upper quadrant abdominal pain which started radiating to left lower quadrant and now left flank pain. She denies any pain associated with meals, she denies any change in diet. She denies fevers, chills, headache, chest pain, shortness of breath. She denies constipation, diarrhea, hematuria, bloody stools. She has a history of 2 C-sections and denies any abnormal bleeding or clotting that she's noticed. She denies any abnormalities in her menstrual cycle. She denies any family history of blood disorders or clotting disorders.      In the ED, initial vitals Temp 97.9F, /70, HR 86, RR 18, SpO2 98% on room air. CBC was WNL, CMP significant for hyponatremia of 130, CO2 of 18 Estimated Creatinine Clearance: 95.7 mL/min (based on SCr of 0.7 mg/dL).  CTA abdomen and pelvis showed abnormal enhancement of splenic region, concerning for splenic infarct. Trace fluid in subcapsular region of spleen and patent splenic artery and vein. EKG showed accelerated junctional rhythm, a prolonged QTc of 541.   In the ED patient was treated with morphine, zofran, 1L NS bolus. [facility] admitted patient for splenic infarct/APLS workup    Overview/Hospital Course:  No notes on file    Interval History: patient still endorsing left sided abdominal and flank pain. No fevers, chills, nausea, vomiting. She stated that she had two transient headaches yesterday night.    Review of Systems    Constitutional:  Negative for chills, fatigue and fever.   Respiratory:  Negative for shortness of breath.    Cardiovascular:  Negative for chest pain, palpitations and leg swelling.   Gastrointestinal:  Positive for abdominal pain (LUQ, LLQ). Negative for diarrhea, nausea and vomiting.   Genitourinary:  Negative for dysuria, hematuria and pelvic pain.   Musculoskeletal:  Positive for back pain (left flank).   Skin:  Negative for pallor and rash.   Neurological:  Negative for dizziness and headaches.   All other systems reviewed and are negative.    Objective:     Vital Signs (Most Recent):  Temp: 98.7 °F (37.1 °C) (01/11/24 1000)  Pulse: 80 (01/11/24 1002)  Resp: 15 (01/11/24 1002)  BP: (!) 108/55 (01/11/24 1002)  SpO2: 97 % (01/11/24 1002) Vital Signs (24h Range):  Temp:  [97.9 °F (36.6 °C)-98.7 °F (37.1 °C)] 98.7 °F (37.1 °C)  Pulse:  [69-95] 80  Resp:  [13-34] 15  SpO2:  [96 %-99 %] 97 %  BP: ()/(49-74) 108/55     Weight: 65.8 kg (145 lb)  Body mass index is 24.89 kg/m².    Intake/Output Summary (Last 24 hours) at 1/11/2024 1159  Last data filed at 1/11/2024 0710  Gross per 24 hour   Intake 2023.4 ml   Output 1 ml   Net 2022.4 ml         Physical Exam  Vitals reviewed.   Constitutional:       General: She is not in acute distress.     Appearance: Normal appearance. She is not ill-appearing, toxic-appearing or diaphoretic.   HENT:      Head: Normocephalic and atraumatic.      Nose: No congestion or rhinorrhea.      Mouth/Throat:      Pharynx: No oropharyngeal exudate or posterior oropharyngeal erythema.   Eyes:      General: No scleral icterus.     Extraocular Movements: Extraocular movements intact.      Pupils: Pupils are equal, round, and reactive to light.   Cardiovascular:      Rate and Rhythm: Normal rate and regular rhythm.      Heart sounds: Normal heart sounds. No murmur heard.     No friction rub. No gallop.   Pulmonary:      Effort: No respiratory distress.      Breath sounds: No stridor. No  wheezing, rhonchi or rales.   Abdominal:      General: Abdomen is flat. Bowel sounds are normal. There is no distension.      Palpations: There is no mass.      Tenderness: There is abdominal tenderness (LUQ, LLQ). There is left CVA tenderness. There is no guarding.      Hernia: No hernia is present.   Musculoskeletal:      Cervical back: Normal range of motion.      Right lower leg: No edema.      Left lower leg: No edema.   Skin:     Coloration: Skin is not jaundiced.      Findings: No bruising or rash.   Neurological:      General: No focal deficit present.      Mental Status: She is alert and oriented to person, place, and time.             Significant Labs: All pertinent labs within the past 24 hours have been reviewed.  CBC:   Recent Labs   Lab 01/10/24  1052 01/10/24  1631 01/11/24  0510   WBC 8.89 7.49 6.14   HGB 13.1 12.1 11.2*   HCT 40.0 35.4* 33.3*    296 272     CMP:   Recent Labs   Lab 01/10/24  1052 01/11/24  0510   * 138   K 5.1 4.6    109   CO2 18* 24   GLU 94 102   BUN 7 12   CREATININE 0.7 0.7   CALCIUM 10.0 9.2   PROT 8.3 6.5   ALBUMIN 3.9 3.2*   BILITOT 0.4 0.2   ALKPHOS 58 51*   AST 27 13   ALT 15 9*   ANIONGAP 11 5*       Significant Imaging: I have reviewed all pertinent imaging results/findings within the past 24 hours.    Assessment/Plan:      * Splenic infarct  Patient with history of LUQ and LLQ pain radiating to left flank. Currently being worked up by heme/onc for APLS. CTA abdomen pelvis with evidence of splenic infarct    Plan:  ASA  Therapeutic lovenox  Consult heme/onc - believes it is likely APLS due to labs and clinical history  Heme/onc believes she should transition to warfarn and coumadin clinic for monitoring  Tylenol for mild pain, norco 5 for moderate pain  Zofran PRN for nausea  F/u cardiolipin, DRVVT, beta 2 glycoprotein  F/u CMV/EBV lab due to splenic infarct        Antiphospholipid syndrome        Prolonged Q-T interval on ECG  Results for orders placed  or performed during the hospital encounter of 01/10/24   EKG 12-lead    Narrative    Test Reason : D73.5,    Vent. Rate : 100 BPM     Atrial Rate : 098 BPM     P-R Int : 000 ms          QRS Dur : 082 ms      QT Int : 420 ms       P-R-T Axes : 000 090 036 degrees     QTc Int : 541 ms    Normal sinus rhythm with 1st degree A-V block  Rightward axis  Septal infarct ,age undetermined  Nonspecific T wave abnormality  Nonspecific ST abnormality  Abnormal ECG  When compared with ECG of 18-DEC-2023 15:53,  rate is faster and there are nonspecific STT changes  Confirmed by Leroy Mancilla MD ([id number]) on 1/10/2024 4:37:01 PM    Referred By: [email or web address]   SELF           Confirmed By:Leroy Mancilla MD     On admit patient with no chest pain, very prolonged QTc of 541    Plan:  F/u  EKG  Monitor on tele    History of cerebrovascular accident (CVA) due to embolism  Patient with history of CVA in September, no current headache or neurological symptoms. Being worked up for APLS    Plan:  ASA, statin      HLD (hyperlipidemia)  Lab Results   Component Value Date    CHOL 222 (H) 12/18/2023    CHOL 277 (H) 09/23/2023     Lab Results   Component Value Date    HDL 54 12/18/2023    HDL 55 09/23/2023     Lab Results   Component Value Date    LDLCALC 155.6 12/18/2023    LDLCALC 200.0 (H) 09/23/2023     Lab Results   Component Value Date    TRIG 62 12/18/2023    TRIG 110 09/23/2023       Lab Results   Component Value Date    CHOLHDL 24.3 12/18/2023    CHOLHDL 19.9 (L) 09/23/2023     Patient with history of hyperlipemia and history of stroke    Plan:  Atorvastatin 40        VTE Risk Mitigation (From admission, onward)           Ordered     enoxaparin injection 70 mg  Every 12 hours         01/11/24 0356     Reason for No Pharmacological VTE Prophylaxis  Once        Question:  Reasons:  Answer:  Physician Provided (leave comment)  Comment:  will do anticoagulation    01/10/24 6355     IP VTE HIGH RISK PATIENT  Once         01/10/24 0097      Place sequential compression device  Until discontinued         01/10/24 1559                    Discharge Planning   THA:      Code Status: Full Code   Is the patient medically ready for discharge?:     Reason for patient still in hospital (select all that apply): Patient trending condition         Hugh Arguello MD  [facility], PGY-1  01/11/2024

## 2024-01-11 NOTE — CONSULTS
[facility] - Emergency Dept  Hematology/Oncology  Consult Note    Patient Name: Rhonda Ahuja  MRN: [id number]  Admission Date: 1/10/2024  Hospital Length of Stay: 0 days  Code Status: Full Code   Attending Provider: Gem Reilly MD  Consulting Provider: Wing Watts MD  Primary Care Physician: Rm Simpson MD  Principal Problem:Splenic infarct    Inpatient consult to Hematology/Oncology  Consult performed by: Wing Watts MD  Consult ordered by: Hugh Arguello MD  Reason for consult: splenic infarct.        Subjective:     HPI:  44 year-old female with history of stroke was admitted on 1/10/24 for splenic infarct. Consult is for possible antiphospholipid syndrome.    - she endorses left-sided abdominal and back pain, fatigue. She denies dyspnea upon exertion      Oncology Treatment Plan:   [No matching plan found]    Medications:  Continuous Infusions:  Scheduled Meds:   aspirin  81 mg Oral Daily    atorvastatin  40 mg Oral Daily    enoxparin  1 mg/kg Subcutaneous Q12H (treatment, non-standard time)     PRN Meds:acetaminophen, dextrose 10%, dextrose 10%, glucagon (human recombinant), glucose, glucose, melatonin, naloxone, ondansetron, oxyCODONE-acetaminophen, senna-docusate 8.6-50 mg, sodium chloride 0.9%     Review of patient's allergies indicates:  No Known Allergies     Past Medical History:   Diagnosis Date    HLD (hyperlipidemia) 2023    Stroke      Past Surgical History:   Procedure Laterality Date    BREAST BIOPSY Right     excisional benign    BUNIONECTOMY       SECTION      x 2    TONSILLECTOMY      TRANSESOPHAGEAL ECHOCARDIOGRAPHY N/A 2023    TRANSESOPHAGEAL ECHOCARDIOGRAPHY N/A 2023    Procedure: ECHOCARDIOGRAM, TRANSESOPHAGEAL;  Surgeon: Wing Yeung MD;  Location: [facility] CATH LAB;  Service: Cardiology;  Laterality: N/A;     Family History       Problem Relation (Age of Onset)    ALS Mother    Breast cancer Maternal Grandmother    Coronary artery disease Father     Diabetes Mother    Heart attack Father    Hypertension Mother    No Known Problems Sister, Brother          Tobacco Use    Smoking status: Never    Smokeless tobacco: Never   Substance and Sexual Activity    Alcohol use: Not Currently    Drug use: Never    Sexual activity: Yes     Partners: Male     Birth control/protection: Coitus interruptus       Review of Systems   Constitutional:  Negative for fatigue.   HENT:  Negative for sore throat.    Eyes:  Negative for visual disturbance.   Respiratory:  Negative for cough and shortness of breath.    Cardiovascular:  Negative for chest pain.   Gastrointestinal:  Positive for abdominal pain. Negative for constipation, diarrhea, nausea and vomiting.   Genitourinary:  Negative for dysuria.   Musculoskeletal:  Positive for back pain.   Skin:  Negative for rash.   Neurological:  Negative for headaches.   Hematological:  Negative for adenopathy.   Psychiatric/Behavioral:  The patient is not nervous/anxious.      Objective:     Vital Signs (Most Recent):  Temp: 98.4 °F (36.9 °C) (01/11/24 0300)  Pulse: 84 (01/11/24 0600)  Resp: 13 (01/11/24 0600)  BP: (!) 97/55 (01/11/24 0600)  SpO2: 97 % (01/11/24 0600) Vital Signs (24h Range):  Temp:  [97.9 °F (36.6 °C)-98.4 °F (36.9 °C)] 98.4 °F (36.9 °C)  Pulse:  [18-95] 84  Resp:  [13-28] 13  SpO2:  [96 %-99 %] 97 %  BP: ()/(49-74) 97/55     Weight: 65.8 kg (145 lb)  Body mass index is 24.89 kg/m².  Body surface area is 1.72 meters squared.      Intake/Output Summary (Last 24 hours) at 1/11/2024 0802  Last data filed at 1/11/2024 0710  Gross per 24 hour   Intake 2023.4 ml   Output 1 ml   Net 2022.4 ml        Physical Exam  Vitals and nursing note reviewed.   Constitutional:       Appearance: She is well-developed.   HENT:      Head: Normocephalic and atraumatic.   Eyes:      Pupils: Pupils are equal, round, and reactive to light.   Cardiovascular:      Rate and Rhythm: Normal rate and regular rhythm.   Pulmonary:      Effort:  Pulmonary effort is normal.      Breath sounds: Normal breath sounds.   Abdominal:      General: Bowel sounds are normal.      Palpations: Abdomen is soft.   Musculoskeletal:         General: Normal range of motion.      Cervical back: Normal range of motion and neck supple.   Skin:     General: Skin is warm and dry.   Neurological:      Mental Status: She is alert and oriented to person, place, and time.   Psychiatric:         Behavior: Behavior normal.         Thought Content: Thought content normal.         Judgment: Judgment normal.          Significant Labs:   CBC:   Recent Labs   Lab 01/10/24  1052 01/10/24  1631 01/11/24  0510   WBC 8.89 7.49 6.14   HGB 13.1 12.1 11.2*   HCT 40.0 35.4* 33.3*    296 272    and CMP:   Recent Labs   Lab 01/10/24  1052 01/11/24  0510   * 138   K 5.1 4.6    109   CO2 18* 24   GLU 94 102   BUN 7 12   CREATININE 0.7 0.7   CALCIUM 10.0 9.2   PROT 8.3 6.5   ALBUMIN 3.9 3.2*   BILITOT 0.4 0.2   ALKPHOS 58 51*   AST 27 13   ALT 15 9*   ANIONGAP 11 5*             Diagnostic Results:  CTA abdomen/pelvis (1/10/24): I have personally reviewed the images    Abnormal enhancement of the spleen concerning for areas of splenic infarction.  There is a trace of fluid in the subcapsular region of the inferior pole of the spleen, short-term follow-up advised, likely due to recent splenic areas of infarction.     Normal appearance of the aorta and its branches.  No significant atherosclerotic plaque.      Assessment/Plan:     * Splenic infarct  - CTA abdomen (1/10/24) revealed a splenic infarct  - given her history of miscarriage, stroke, and positive beta-2 glycoprotein antibody (don't have confirmatory lab testing yet though), I feel comfortable stating she has a hypercoagulable state, most likely antiphospholipid syndrome.  - she is receiving enoxaparin right now in the hospital. Recommend transition to warfarin. Recommend referral (outpatient) to coumadin monitoring clinic at  time of discharge.  - I will arrange for a follow-up appointment.    Antiphospholipid syndrome  - see above.    History of cerebrovascular accident (CVA) due to embolism  - see above        Thank you for your consult.     Wing Watts MD  Hematology/Oncology  [facility] - Emergency Dept

## 2024-01-11 NOTE — ASSESSMENT & PLAN NOTE
Patient with history of LUQ and LLQ pain radiating to left flank. Currently being worked up by heme/onc for APLS. CTA abdomen pelvis with evidence of splenic infarct    Plan:  ASA  Therapeutic lovenox  Consult heme/onc - believes it is likely APLS due to labs and clinical history  Heme/onc believes she should transition to warfarn and coumadin clinic for monitoring  Tylenol for mild pain, norco 5 for moderate pain  Zofran PRN for nausea  F/u cardiolipin, DRVVT, beta 2 glycoprotein  F/u CMV/EBV lab due to splenic infarct

## 2024-01-11 NOTE — SUBJECTIVE & OBJECTIVE
- she endorses left-sided abdominal and back pain, fatigue. She denies dyspnea upon exertion      Oncology Treatment Plan:   [No matching plan found]    Medications:  Continuous Infusions:  Scheduled Meds:   aspirin  81 mg Oral Daily    atorvastatin  40 mg Oral Daily    enoxparin  1 mg/kg Subcutaneous Q12H (treatment, non-standard time)     PRN Meds:acetaminophen, dextrose 10%, dextrose 10%, glucagon (human recombinant), glucose, glucose, melatonin, naloxone, ondansetron, oxyCODONE-acetaminophen, senna-docusate 8.6-50 mg, sodium chloride 0.9%     Review of patient's allergies indicates:  No Known Allergies     Past Medical History:   Diagnosis Date    HLD (hyperlipidemia) 2023    Stroke      Past Surgical History:   Procedure Laterality Date    BREAST BIOPSY Right     excisional benign    BUNIONECTOMY       SECTION      x 2    TONSILLECTOMY      TRANSESOPHAGEAL ECHOCARDIOGRAPHY N/A 2023    TRANSESOPHAGEAL ECHOCARDIOGRAPHY N/A 2023    Procedure: ECHOCARDIOGRAM, TRANSESOPHAGEAL;  Surgeon: Wing Yeung MD;  Location: [facility] CATH LAB;  Service: Cardiology;  Laterality: N/A;     Family History       Problem Relation (Age of Onset)    ALS Mother    Breast cancer Maternal Grandmother    Coronary artery disease Father    Diabetes Mother    Heart attack Father    Hypertension Mother    No Known Problems Sister, Brother          Tobacco Use    Smoking status: Never    Smokeless tobacco: Never   Substance and Sexual Activity    Alcohol use: Not Currently    Drug use: Never    Sexual activity: Yes     Partners: Male     Birth control/protection: Coitus interruptus       Review of Systems   Constitutional:  Negative for fatigue.   HENT:  Negative for sore throat.    Eyes:  Negative for visual disturbance.   Respiratory:  Negative for cough and shortness of breath.    Cardiovascular:  Negative for chest pain.   Gastrointestinal:  Positive for abdominal pain. Negative for constipation, diarrhea, nausea  and vomiting.   Genitourinary:  Negative for dysuria.   Musculoskeletal:  Positive for back pain.   Skin:  Negative for rash.   Neurological:  Negative for headaches.   Hematological:  Negative for adenopathy.   Psychiatric/Behavioral:  The patient is not nervous/anxious.      Objective:     Vital Signs (Most Recent):  Temp: 98.4 °F (36.9 °C) (01/11/24 0300)  Pulse: 84 (01/11/24 0600)  Resp: 13 (01/11/24 0600)  BP: (!) 97/55 (01/11/24 0600)  SpO2: 97 % (01/11/24 0600) Vital Signs (24h Range):  Temp:  [97.9 °F (36.6 °C)-98.4 °F (36.9 °C)] 98.4 °F (36.9 °C)  Pulse:  [18-95] 84  Resp:  [13-28] 13  SpO2:  [96 %-99 %] 97 %  BP: ()/(49-74) 97/55     Weight: 65.8 kg (145 lb)  Body mass index is 24.89 kg/m².  Body surface area is 1.72 meters squared.      Intake/Output Summary (Last 24 hours) at 1/11/2024 0802  Last data filed at 1/11/2024 0710  Gross per 24 hour   Intake 2023.4 ml   Output 1 ml   Net 2022.4 ml        Physical Exam  Vitals and nursing note reviewed.   Constitutional:       Appearance: She is well-developed.   HENT:      Head: Normocephalic and atraumatic.   Eyes:      Pupils: Pupils are equal, round, and reactive to light.   Cardiovascular:      Rate and Rhythm: Normal rate and regular rhythm.   Pulmonary:      Effort: Pulmonary effort is normal.      Breath sounds: Normal breath sounds.   Abdominal:      General: Bowel sounds are normal.      Palpations: Abdomen is soft.   Musculoskeletal:         General: Normal range of motion.      Cervical back: Normal range of motion and neck supple.   Skin:     General: Skin is warm and dry.   Neurological:      Mental Status: She is alert and oriented to person, place, and time.   Psychiatric:         Behavior: Behavior normal.         Thought Content: Thought content normal.         Judgment: Judgment normal.          Significant Labs:   CBC:   Recent Labs   Lab 01/10/24  1052 01/10/24  1631 01/11/24  0510   WBC 8.89 7.49 6.14   HGB 13.1 12.1 11.2*   HCT 40.0  35.4* 33.3*    296 272    and CMP:   Recent Labs   Lab 01/10/24  1052 01/11/24  0510   * 138   K 5.1 4.6    109   CO2 18* 24   GLU 94 102   BUN 7 12   CREATININE 0.7 0.7   CALCIUM 10.0 9.2   PROT 8.3 6.5   ALBUMIN 3.9 3.2*   BILITOT 0.4 0.2   ALKPHOS 58 51*   AST 27 13   ALT 15 9*   ANIONGAP 11 5*             Diagnostic Results:  CTA abdomen/pelvis (1/10/24): I have personally reviewed the images    Abnormal enhancement of the spleen concerning for areas of splenic infarction.  There is a trace of fluid in the subcapsular region of the inferior pole of the spleen, short-term follow-up advised, likely due to recent splenic areas of infarction.     Normal appearance of the aorta and its branches.  No significant atherosclerotic plaque.

## 2024-01-11 NOTE — PLAN OF CARE
CM met with pt and SO Conrad Jennings  [phone]   Pt is AAOx3, confirmed demographics   Independent prior to admit - pt drives, no dme, no hh   dx:  splenic infarct   Mr. Jennings will transport pt to home at d/c.         01/11/24 3544   Discharge Planning   Assessment Type Discharge Planning Brief Assessment   Resource/Environmental Concerns none   Support Systems Spouse/significant other  (SO Conrad Jennings   [phone])   Equipment Currently Used at Home none   Current Living Arrangements home   Patient/Family Anticipates Transition to home;home with family   Patient/Family Anticipated Services at Transition none   DME Needed Upon Discharge  none   Discharge Plan A Home;Home with family

## 2024-01-11 NOTE — ED NOTES
Patient c/o 9/10 left sided abdominal pain. [facility] medicine Dr Zamudio at bedside.  Stated that he would order IV pain meds.

## 2024-01-11 NOTE — ASSESSMENT & PLAN NOTE
Results for orders placed or performed during the hospital encounter of 01/10/24   EKG 12-lead    Narrative    Test Reason : D73.5,    Vent. Rate : 100 BPM     Atrial Rate : 098 BPM     P-R Int : 000 ms          QRS Dur : 082 ms      QT Int : 420 ms       P-R-T Axes : 000 090 036 degrees     QTc Int : 541 ms    Normal sinus rhythm with 1st degree A-V block  Rightward axis  Septal infarct ,age undetermined  Nonspecific T wave abnormality  Nonspecific ST abnormality  Abnormal ECG  When compared with ECG of 18-DEC-2023 15:53,  rate is faster and there are nonspecific STT changes  Confirmed by Leroy Mancilla MD ([id number]) on 1/10/2024 4:37:01 PM    Referred By: [email or web address]   SELF           Confirmed By:Leroy Mancilla MD     On admit patient with no chest pain, very prolonged QTc of 541    Plan:  F/u  EKG  Monitor on tele

## 2024-01-12 PROBLEM — R10.9 LEFT SIDED ABDOMINAL PAIN: Status: ACTIVE | Noted: 2024-01-12

## 2024-01-12 LAB
ALBUMIN SERPL BCP-MCNC: 3.1 G/DL (ref 3.5–5.2)
ALP SERPL-CCNC: 45 U/L (ref 55–135)
ALT SERPL W/O P-5'-P-CCNC: 9 U/L (ref 10–44)
ANION GAP SERPL CALC-SCNC: 5 MMOL/L (ref 8–16)
AST SERPL-CCNC: 11 U/L (ref 10–40)
BILIRUB SERPL-MCNC: 0.1 MG/DL (ref 0.1–1)
BUN SERPL-MCNC: 10 MG/DL (ref 6–20)
CALCIUM SERPL-MCNC: 9.4 MG/DL (ref 8.7–10.5)
CHLORIDE SERPL-SCNC: 108 MMOL/L (ref 95–110)
CO2 SERPL-SCNC: 27 MMOL/L (ref 23–29)
CREAT SERPL-MCNC: 0.7 MG/DL (ref 0.5–1.4)
EST. GFR  (NO RACE VARIABLE): >60 ML/MIN/1.73 M^2
GLUCOSE SERPL-MCNC: 103 MG/DL (ref 70–110)
INR PPP: 1 (ref 0.8–1.2)
MAGNESIUM SERPL-MCNC: 1.9 MG/DL (ref 1.6–2.6)
PHOSPHATE SERPL-MCNC: 3.3 MG/DL (ref 2.7–4.5)
POTASSIUM SERPL-SCNC: 4.3 MMOL/L (ref 3.5–5.1)
PROT SERPL-MCNC: 6.1 G/DL (ref 6–8.4)
PROTHROMBIN TIME: 11.3 SEC (ref 9–12.5)
SODIUM SERPL-SCNC: 140 MMOL/L (ref 136–145)

## 2024-01-12 PROCEDURE — 25000003 PHARM REV CODE 250

## 2024-01-12 PROCEDURE — 63600175 PHARM REV CODE 636 W HCPCS: Performed by: STUDENT IN AN ORGANIZED HEALTH CARE EDUCATION/TRAINING PROGRAM

## 2024-01-12 PROCEDURE — 83735 ASSAY OF MAGNESIUM: CPT

## 2024-01-12 PROCEDURE — 96372 THER/PROPH/DIAG INJ SC/IM: CPT | Performed by: STUDENT IN AN ORGANIZED HEALTH CARE EDUCATION/TRAINING PROGRAM

## 2024-01-12 PROCEDURE — 84100 ASSAY OF PHOSPHORUS: CPT

## 2024-01-12 PROCEDURE — 11000001 HC ACUTE MED/SURG PRIVATE ROOM

## 2024-01-12 PROCEDURE — 63600175 PHARM REV CODE 636 W HCPCS

## 2024-01-12 PROCEDURE — 85610 PROTHROMBIN TIME: CPT

## 2024-01-12 PROCEDURE — 80053 COMPREHEN METABOLIC PANEL: CPT

## 2024-01-12 RX ORDER — MAGNESIUM SULFATE HEPTAHYDRATE 40 MG/ML
2 INJECTION, SOLUTION INTRAVENOUS ONCE
Status: COMPLETED | OUTPATIENT
Start: 2024-01-12 | End: 2024-01-12

## 2024-01-12 RX ORDER — HYDROCODONE BITARTRATE AND ACETAMINOPHEN 10; 325 MG/1; MG/1
1 TABLET ORAL EVERY 4 HOURS PRN
Status: DISCONTINUED | OUTPATIENT
Start: 2024-01-12 | End: 2024-01-15

## 2024-01-12 RX ORDER — HYDROMORPHONE HYDROCHLORIDE 1 MG/ML
1 INJECTION, SOLUTION INTRAMUSCULAR; INTRAVENOUS; SUBCUTANEOUS EVERY 6 HOURS PRN
Status: DISCONTINUED | OUTPATIENT
Start: 2024-01-12 | End: 2024-01-15

## 2024-01-12 RX ADMIN — ENOXAPARIN SODIUM 70 MG: 100 INJECTION SUBCUTANEOUS at 05:01

## 2024-01-12 RX ADMIN — MAGNESIUM SULFATE HEPTAHYDRATE 2 G: 40 INJECTION, SOLUTION INTRAVENOUS at 10:01

## 2024-01-12 RX ADMIN — WARFARIN SODIUM 7.5 MG: 5 TABLET ORAL at 05:01

## 2024-01-12 RX ADMIN — HYDROMORPHONE HYDROCHLORIDE 1 MG: 1 INJECTION, SOLUTION INTRAMUSCULAR; INTRAVENOUS; SUBCUTANEOUS at 09:01

## 2024-01-12 RX ADMIN — ONDANSETRON 4 MG: 2 INJECTION INTRAMUSCULAR; INTRAVENOUS at 09:01

## 2024-01-12 RX ADMIN — HYDROCODONE BITARTRATE AND ACETAMINOPHEN 1 TABLET: 10; 325 TABLET ORAL at 02:01

## 2024-01-12 RX ADMIN — ASPIRIN 81 MG CHEWABLE TABLET 81 MG: 81 TABLET CHEWABLE at 09:01

## 2024-01-12 RX ADMIN — ENOXAPARIN SODIUM 70 MG: 100 INJECTION SUBCUTANEOUS at 04:01

## 2024-01-12 RX ADMIN — OXYCODONE HYDROCHLORIDE AND ACETAMINOPHEN 1 TABLET: 5; 325 TABLET ORAL at 03:01

## 2024-01-12 NOTE — PROGRESS NOTES
[facility]                   Pharmacy  Pharmacy Warfarin Education Note       Rhonda Ahuja was offered Warfarin Education on 01/12/2024.  The patient and/or family/caregiver was present and accepted education, using teachback method. .    Patient specific concerns or situations: n/a    Warfarin education materials provided and information discussed during the education process included:  1. What warfarin is  2. Indication, current dose,how to take warfarin, what time of day to take warfarin,      Follow-up appointment for monitoring  3. What to do if you miss a dose  4. Drug interactions associated with the use of anticoagulants (I.e. Warfarin), such as        the use of over the counter medications (e.g. Aspirin, acetaminophen, ibuprofen),        prescription drugs, and herbal supplements. Notify prescribing doctor of any        changes in medications.  5.  Side effects of taking warfarin, increased bleeding risk, when to call the prescribing       physician, when to seek emergency help  6.  Monitoring blood levels (PT/INR)  7.  Eating habits and being consistent, Vitamin K rich foods, effects of diet on blood       Levels  8.  Recommendation of purchasing a medical bracelet or carrying a ID card to alert medical staff if you become ill  9.  Notifying physicians of procedures, medical or dental      Anticoagulants       Ordered     Route Frequency Start Stop    01/11/24 1234  warfarin (COUMADIN) tablet         Oral Daily 01/11/24 1700 --    01/11/24 0353  enoxaparin         SubQ Every 12 hours 01/11/24 0530 --          Lab Results   Component Value Date/Time    INR 1.0 01/12/2024 05:07 AM    INR 1.0 01/11/2024 12:40 PM    INR 1.0 01/10/2024 02:21 PM    INR 1.1 09/23/2023 09:57 AM   ;  Lab Results   Component Value Date/Time    HGB 11.4 (L) 01/11/2024 12:54 PM    HGB 11.2 (L) 01/11/2024 05:10 AM    HGB 12.1 01/10/2024 04:31 PM           Re Aguilar, PharmD, BCPS, BCCCP  Clinical  Pharmacist  [phone]

## 2024-01-12 NOTE — ASSESSMENT & PLAN NOTE
Patient with history of LUQ and LLQ pain radiating to left flank. Currently being worked up by heme/onc for APLS. CTA abdomen pelvis with evidence of splenic infarct.    Plan:  ASA  Therapeutic lovenox until can bridge to warfarin   Consult heme/onc - believes it is likely APLS due to previous beta 2 glycoprotein and clinical history  Started warfarin 7.5, INR on 1/12 was 1.0, continue to monitor  Daily INR until therapeutic  Tylenol for mild pain, norco 5 for moderate pain, dilaudid 1 for intense pain  Zofran PRN for nausea  F/u cardiolipin, DRVVT, beta 2 glycoprotein  F/u CMV/EBV lab due to splenic infarct

## 2024-01-12 NOTE — NURSING
Pt. Placed on regular diet-coumadin restriction. Bedside nurse printed out patient education pertaining to the importance of monitoring your vitamin K intake while taking coumadin. Bedside nurse reviewed with Pt. At bedside. Pt. Voiced understanding. No other questions at this time. Plan of care continued.

## 2024-01-12 NOTE — PLAN OF CARE
Pt. AAOx4. C/O pain, moderate relief with PRN. Family at bedside. Pt. Tolerating diet well, diet modified to coumadin diet and Pt. Received education pertaining to coumadin diet. Call light in reach and Pt. Instructed to call for assistance. Plan of care continued.   Problem: Adult Inpatient Plan of Care  Goal: Plan of Care Review  Outcome: Ongoing, Progressing     Problem: Adult Inpatient Plan of Care  Goal: Patient-Specific Goal (Individualized)  Outcome: Ongoing, Progressing     Problem: Adult Inpatient Plan of Care  Goal: Absence of Hospital-Acquired Illness or Injury  Outcome: Ongoing, Progressing     Problem: Adult Inpatient Plan of Care  Goal: Optimal Comfort and Wellbeing  Outcome: Ongoing, Progressing

## 2024-01-12 NOTE — PLAN OF CARE
VIRTUAL NURSE:  Cued into patient's room.  Permission received per patient to turn camera to view patient.  Introduced as VN that will be working with floor nurse and nursing assistant.  Educated patient on VN's role in patient care and  VIP model.  Plan of care reviewed with patient.  Education per flowsheet.   Informed patient that staff will round on them every 2 hours but to use call light for any other needs they may have; informed of fall risk and fall precautions.  Patient verbalized understanding.  Call light within reach; bed siderails up x3.  Opportunity given for questions and questions answered.  Admission assessment questions answered.  Patient denies complaints or any needs at this time. Instructed to call for assistance.  Will cont to monitor and intervene as needed.    Labs, notes, orders, and careplan reviewed.     01/11/24 1950   Admission   Initial VN Admission Questions Complete   Shift   Virtual Nurse - Rounding Complete   Virtual Nurse - Patient Verbalized Approval Of Camera Use;VN Rounding   Type of Frequent Check   Type Patient Rounds   Safety/Activity   Patient Rounds bed in low position;bed wheels locked;call light in patient/parent reach;clutter free environment maintained;ID band on;visualized patient;toileting offered;placement of personal items at bedside   Safety Promotion/Fall Prevention assistive device/personal item within reach   Safety Precautions emergency equipment at bedside   Activity Assistance Provided assistance, 1 person   Positioning   Body Position position changed independently

## 2024-01-12 NOTE — PROGRESS NOTES
Family Medicine  Progress Note    Patient Name: Rhonda Ahuja  MRN: [id number]  Patient Class: IP- Inpatient   Admission Date: 1/10/2024  Length of Stay: 0 days  Attending Physician: Gem Reilly MD  Primary Care Provider: Rm Simpson MD        Subjective:     Principal Problem:Splenic infarct      HPI:  Patient is a 44 y.o.-year-old female w/ PMHx CVA in September 23 of this year, currently seeing heme/onc (Dr. Watts) for workup for APLS who presents to the ED with reports of abdominal pain since Sarbjit night. The pain started with a left upper quadrant abdominal pain which started radiating to left lower quadrant and now left flank pain. She denies any pain associated with meals, she denies any change in diet. She denies fevers, chills, headache, chest pain, shortness of breath. She denies constipation, diarrhea, hematuria, bloody stools. She has a history of 2 C-sections and denies any abnormal bleeding or clotting that she's noticed. She denies any abnormalities in her menstrual cycle. She denies any family history of blood disorders or clotting disorders.      In the ED, initial vitals Temp 97.9F, /70, HR 86, RR 18, SpO2 98% on room air. CBC was WNL, CMP significant for hyponatremia of 130, CO2 of 18 Estimated Creatinine Clearance: 95.7 mL/min (based on SCr of 0.7 mg/dL).  CTA abdomen and pelvis showed abnormal enhancement of splenic region, concerning for splenic infarct. Trace fluid in subcapsular region of spleen and patent splenic artery and vein. EKG showed accelerated junctional rhythm, a prolonged QTc of 541.   In the ED patient was treated with morphine, zofran, 1L NS bolus. [facility] admitted patient for splenic infarct/APLS workup      Interval History: Patient had no acute events overnight, no headache, fevers, chills. She had profuse abdominal and flank pain last night and this morning but it was well controlled after this mornings dilaudid.     Review of Systems    Constitutional:  Negative for chills, fatigue and fever.   Respiratory:  Negative for shortness of breath.    Cardiovascular:  Negative for chest pain, palpitations and leg swelling.   Gastrointestinal:  Positive for abdominal pain (LUQ, LLQ). Negative for diarrhea, nausea and vomiting.   Genitourinary:  Negative for dysuria, hematuria and pelvic pain.   Musculoskeletal:  Positive for back pain (left flank).   Skin:  Negative for pallor and rash.   Neurological:  Negative for dizziness and headaches.   All other systems reviewed and are negative.    Objective:     Vital Signs (Most Recent):  Temp: 97.8 °F (36.6 °C) (01/12/24 1136)  Pulse: 75 (01/12/24 1136)  Resp: 17 (01/12/24 1136)  BP: 106/62 (01/12/24 1136)  SpO2: 96 % (01/12/24 1136) Vital Signs (24h Range):  Temp:  [97.8 °F (36.6 °C)-98.5 °F (36.9 °C)] 97.8 °F (36.6 °C)  Pulse:  [75-92] 75  Resp:  [16-27] 17  SpO2:  [95 %-99 %] 96 %  BP: (102-126)/(59-71) 106/62     Weight: 65.6 kg (144 lb 10 oz)  Body mass index is 24.82 kg/m².    Intake/Output Summary (Last 24 hours) at 1/12/2024 1301  Last data filed at 1/12/2024 0845  Gross per 24 hour   Intake 220 ml   Output --   Net 220 ml         Physical Exam  Vitals reviewed.   Constitutional:       General: She is not in acute distress.     Appearance: Normal appearance. She is not ill-appearing, toxic-appearing or diaphoretic.   HENT:      Head: Normocephalic and atraumatic.      Nose: No congestion or rhinorrhea.      Mouth/Throat:      Pharynx: No oropharyngeal exudate or posterior oropharyngeal erythema.   Eyes:      General: No scleral icterus.     Extraocular Movements: Extraocular movements intact.      Pupils: Pupils are equal, round, and reactive to light.   Cardiovascular:      Rate and Rhythm: Normal rate and regular rhythm.      Heart sounds: Normal heart sounds. No murmur heard.     No friction rub. No gallop.   Pulmonary:      Effort: No respiratory distress.      Breath sounds: No stridor. No  wheezing, rhonchi or rales.   Abdominal:      General: Abdomen is flat. Bowel sounds are normal. There is no distension.      Palpations: There is no mass.      Tenderness: There is abdominal tenderness (LUQ, LLQ). There is left CVA tenderness. There is no guarding.      Hernia: No hernia is present.   Musculoskeletal:      Cervical back: Normal range of motion.      Right lower leg: No edema.      Left lower leg: No edema.   Skin:     Coloration: Skin is not jaundiced.      Findings: No bruising or rash.   Neurological:      General: No focal deficit present.      Mental Status: She is alert and oriented to person, place, and time.             Significant Labs: All pertinent labs within the past 24 hours have been reviewed.  CBC:   Recent Labs   Lab 01/10/24  1631 01/11/24  0510 01/11/24  1254   WBC 7.49 6.14 8.55   HGB 12.1 11.2* 11.4*   HCT 35.4* 33.3* 33.8*    272 269     CMP:   Recent Labs   Lab 01/11/24  0510 01/12/24  0508    140   K 4.6 4.3    108   CO2 24 27    103   BUN 12 10   CREATININE 0.7 0.7   CALCIUM 9.2 9.4   PROT 6.5 6.1   ALBUMIN 3.2* 3.1*   BILITOT 0.2 0.1   ALKPHOS 51* 45*   AST 13 11   ALT 9* 9*   ANIONGAP 5* 5*       Significant Imaging: I have reviewed all pertinent imaging results/findings within the past 24 hours.    Assessment/Plan:      * Splenic infarct  Patient with history of LUQ and LLQ pain radiating to left flank. Currently being worked up by heme/onc for APLS. CTA abdomen pelvis with evidence of splenic infarct.    Plan:  ASA  Therapeutic lovenox until can bridge to warfarin   Consult heme/onc - believes it is likely APLS due to previous beta 2 glycoprotein and clinical history  Started warfarin 7.5, INR on 1/12 was 1.0, continue to monitor  Daily INR until therapeutic  Tylenol for mild pain, norco 5 for moderate pain, dilaudid 1 for intense pain  Zofran PRN for nausea  F/u cardiolipin, DRVVT, beta 2 glycoprotein  F/u CMV/EBV lab due to splenic  "infarct        Antiphospholipid syndrome  See "splenic infarct"     Prolonged Q-T interval on ECG  Results for orders placed or performed during the hospital encounter of 01/10/24   EKG 12-lead    Narrative    Test Reason : D73.5,    Vent. Rate : 100 BPM     Atrial Rate : 098 BPM     P-R Int : 000 ms          QRS Dur : 082 ms      QT Int : 420 ms       P-R-T Axes : 000 090 036 degrees     QTc Int : 541 ms    Normal sinus rhythm with 1st degree A-V block  Rightward axis  Septal infarct ,age undetermined  Nonspecific T wave abnormality  Nonspecific ST abnormality  Abnormal ECG  When compared with ECG of 18-DEC-2023 15:53,  rate is faster and there are nonspecific STT changes  Confirmed by Leroy Mancilla MD ([id number]) on 1/10/2024 4:37:01 PM    Referred By: ERICA   SELF           Confirmed By:Leroy Mancilla MD     On admit patient with no chest pain, very prolonged QTc of 541    Plan:  F/u  EKG  Monitor on tele    History of cerebrovascular accident (CVA) due to embolism  Patient with history of CVA in September, no current headache or neurological symptoms. Being worked up for APLS    Plan:  ASA, statin      HLD (hyperlipidemia)  Lab Results   Component Value Date    CHOL 222 (H) 12/18/2023    CHOL 277 (H) 09/23/2023     Lab Results   Component Value Date    HDL 54 12/18/2023    HDL 55 09/23/2023     Lab Results   Component Value Date    LDLCALC 155.6 12/18/2023    LDLCALC 200.0 (H) 09/23/2023     Lab Results   Component Value Date    TRIG 62 12/18/2023    TRIG 110 09/23/2023       Lab Results   Component Value Date    CHOLHDL 24.3 12/18/2023    CHOLHDL 19.9 (L) 09/23/2023     Patient with history of hyperlipemia and history of stroke    Plan:  Atorvastatin 40        VTE Risk Mitigation (From admission, onward)           Ordered     warfarin tablet 7.5 mg  Daily         01/11/24 1234     enoxaparin injection 70 mg  Every 12 hours         01/11/24 0353     Reason for No Pharmacological VTE Prophylaxis  Once      "   Question:  Reasons:  Answer:  Physician Provided (leave comment)  Comment:  will do anticoagulation    01/10/24 1559     IP VTE HIGH RISK PATIENT  Once         01/10/24 1559     Place sequential compression device  Until discontinued         01/10/24 1559                    Discharge Planning   THA:      Code Status: Full Code   Is the patient medically ready for discharge?:     Reason for patient still in hospital (select all that apply): Patient trending condition  Discharge Plan A: Home, Home with family      Hugh Arguello MD  [facility], PGY-1  01/12/2024

## 2024-01-12 NOTE — SUBJECTIVE & OBJECTIVE
Interval History: Patient had no acute events overnight, no headache, fevers, chills. She had profuse abdominal and flank pain last night and this morning but it was well controlled after this mornings dilaudid.     Review of Systems   Constitutional:  Negative for chills, fatigue and fever.   Respiratory:  Negative for shortness of breath.    Cardiovascular:  Negative for chest pain, palpitations and leg swelling.   Gastrointestinal:  Positive for abdominal pain (LUQ, LLQ). Negative for diarrhea, nausea and vomiting.   Genitourinary:  Negative for dysuria, hematuria and pelvic pain.   Musculoskeletal:  Positive for back pain (left flank).   Skin:  Negative for pallor and rash.   Neurological:  Negative for dizziness and headaches.   All other systems reviewed and are negative.    Objective:     Vital Signs (Most Recent):  Temp: 97.8 °F (36.6 °C) (01/12/24 1136)  Pulse: 75 (01/12/24 1136)  Resp: 17 (01/12/24 1136)  BP: 106/62 (01/12/24 1136)  SpO2: 96 % (01/12/24 1136) Vital Signs (24h Range):  Temp:  [97.8 °F (36.6 °C)-98.5 °F (36.9 °C)] 97.8 °F (36.6 °C)  Pulse:  [75-92] 75  Resp:  [16-27] 17  SpO2:  [95 %-99 %] 96 %  BP: (102-126)/(59-71) 106/62     Weight: 65.6 kg (144 lb 10 oz)  Body mass index is 24.82 kg/m².    Intake/Output Summary (Last 24 hours) at 1/12/2024 1301  Last data filed at 1/12/2024 0845  Gross per 24 hour   Intake 220 ml   Output --   Net 220 ml         Physical Exam  Vitals reviewed.   Constitutional:       General: She is not in acute distress.     Appearance: Normal appearance. She is not ill-appearing, toxic-appearing or diaphoretic.   HENT:      Head: Normocephalic and atraumatic.      Nose: No congestion or rhinorrhea.      Mouth/Throat:      Pharynx: No oropharyngeal exudate or posterior oropharyngeal erythema.   Eyes:      General: No scleral icterus.     Extraocular Movements: Extraocular movements intact.      Pupils: Pupils are equal, round, and reactive to light.   Cardiovascular:       Rate and Rhythm: Normal rate and regular rhythm.      Heart sounds: Normal heart sounds. No murmur heard.     No friction rub. No gallop.   Pulmonary:      Effort: No respiratory distress.      Breath sounds: No stridor. No wheezing, rhonchi or rales.   Abdominal:      General: Abdomen is flat. Bowel sounds are normal. There is no distension.      Palpations: There is no mass.      Tenderness: There is abdominal tenderness (LUQ, LLQ). There is left CVA tenderness. There is no guarding.      Hernia: No hernia is present.   Musculoskeletal:      Cervical back: Normal range of motion.      Right lower leg: No edema.      Left lower leg: No edema.   Skin:     Coloration: Skin is not jaundiced.      Findings: No bruising or rash.   Neurological:      General: No focal deficit present.      Mental Status: She is alert and oriented to person, place, and time.             Significant Labs: All pertinent labs within the past 24 hours have been reviewed.  CBC:   Recent Labs   Lab 01/10/24  1631 01/11/24  0510 01/11/24  1254   WBC 7.49 6.14 8.55   HGB 12.1 11.2* 11.4*   HCT 35.4* 33.3* 33.8*    272 269     CMP:   Recent Labs   Lab 01/11/24  0510 01/12/24  0508    140   K 4.6 4.3    108   CO2 24 27    103   BUN 12 10   CREATININE 0.7 0.7   CALCIUM 9.2 9.4   PROT 6.5 6.1   ALBUMIN 3.2* 3.1*   BILITOT 0.2 0.1   ALKPHOS 51* 45*   AST 13 11   ALT 9* 9*   ANIONGAP 5* 5*       Significant Imaging: I have reviewed all pertinent imaging results/findings within the past 24 hours.

## 2024-01-13 LAB
ALBUMIN SERPL BCP-MCNC: 3.2 G/DL (ref 3.5–5.2)
ALP SERPL-CCNC: 47 U/L (ref 55–135)
ALT SERPL W/O P-5'-P-CCNC: 7 U/L (ref 10–44)
ANION GAP SERPL CALC-SCNC: 9 MMOL/L (ref 8–16)
AST SERPL-CCNC: 10 U/L (ref 10–40)
B2 GLYCOPROT1 IGG SERPL IA-ACNC: <9.4 SGU
B2 GLYCOPROT1 IGM SERPL IA-ACNC: <9.4 SMU
BASOPHILS # BLD AUTO: 0.09 K/UL (ref 0–0.2)
BASOPHILS NFR BLD: 1.6 % (ref 0–1.9)
BILIRUB SERPL-MCNC: 0.2 MG/DL (ref 0.1–1)
BUN SERPL-MCNC: 7 MG/DL (ref 6–20)
CALCIUM SERPL-MCNC: 9.4 MG/DL (ref 8.7–10.5)
CHLORIDE SERPL-SCNC: 106 MMOL/L (ref 95–110)
CO2 SERPL-SCNC: 23 MMOL/L (ref 23–29)
CREAT SERPL-MCNC: 0.7 MG/DL (ref 0.5–1.4)
DIFFERENTIAL METHOD BLD: ABNORMAL
EOSINOPHIL # BLD AUTO: 0.2 K/UL (ref 0–0.5)
EOSINOPHIL NFR BLD: 2.9 % (ref 0–8)
ERYTHROCYTE [DISTWIDTH] IN BLOOD BY AUTOMATED COUNT: 11.9 % (ref 11.5–14.5)
EST. GFR  (NO RACE VARIABLE): >60 ML/MIN/1.73 M^2
GLUCOSE SERPL-MCNC: 99 MG/DL (ref 70–110)
HCT VFR BLD AUTO: 33.9 % (ref 37–48.5)
HGB BLD-MCNC: 11.6 G/DL (ref 12–16)
IMM GRANULOCYTES # BLD AUTO: 0.01 K/UL (ref 0–0.04)
IMM GRANULOCYTES NFR BLD AUTO: 0.2 % (ref 0–0.5)
INR PPP: 1.7 (ref 0.8–1.2)
LYMPHOCYTES # BLD AUTO: 2 K/UL (ref 1–4.8)
LYMPHOCYTES NFR BLD: 33.6 % (ref 18–48)
MAGNESIUM SERPL-MCNC: 2 MG/DL (ref 1.6–2.6)
MCH RBC QN AUTO: 31.4 PG (ref 27–31)
MCHC RBC AUTO-ENTMCNC: 34.2 G/DL (ref 32–36)
MCV RBC AUTO: 92 FL (ref 82–98)
MONOCYTES # BLD AUTO: 0.5 K/UL (ref 0.3–1)
MONOCYTES NFR BLD: 9.3 % (ref 4–15)
NEUTROPHILS # BLD AUTO: 3 K/UL (ref 1.8–7.7)
NEUTROPHILS NFR BLD: 52.4 % (ref 38–73)
NRBC BLD-RTO: 0 /100 WBC
PHOSPHATE SERPL-MCNC: 3.2 MG/DL (ref 2.7–4.5)
PLATELET # BLD AUTO: 313 K/UL (ref 150–450)
PMV BLD AUTO: 9.5 FL (ref 9.2–12.9)
POCT GLUCOSE: 127 MG/DL (ref 70–110)
POCT GLUCOSE: 80 MG/DL (ref 70–110)
POTASSIUM SERPL-SCNC: 4.2 MMOL/L (ref 3.5–5.1)
PROT SERPL-MCNC: 6.3 G/DL (ref 6–8.4)
PROTHROMBIN TIME: 17.6 SEC (ref 9–12.5)
RBC # BLD AUTO: 3.69 M/UL (ref 4–5.4)
SODIUM SERPL-SCNC: 138 MMOL/L (ref 136–145)
WBC # BLD AUTO: 5.8 K/UL (ref 3.9–12.7)

## 2024-01-13 PROCEDURE — 85610 PROTHROMBIN TIME: CPT

## 2024-01-13 PROCEDURE — 85025 COMPLETE CBC W/AUTO DIFF WBC: CPT

## 2024-01-13 PROCEDURE — 36415 COLL VENOUS BLD VENIPUNCTURE: CPT

## 2024-01-13 PROCEDURE — 84100 ASSAY OF PHOSPHORUS: CPT

## 2024-01-13 PROCEDURE — 80053 COMPREHEN METABOLIC PANEL: CPT

## 2024-01-13 PROCEDURE — 63600175 PHARM REV CODE 636 W HCPCS: Performed by: STUDENT IN AN ORGANIZED HEALTH CARE EDUCATION/TRAINING PROGRAM

## 2024-01-13 PROCEDURE — 25000003 PHARM REV CODE 250

## 2024-01-13 PROCEDURE — 11000001 HC ACUTE MED/SURG PRIVATE ROOM

## 2024-01-13 PROCEDURE — 83735 ASSAY OF MAGNESIUM: CPT

## 2024-01-13 RX ORDER — WARFARIN SODIUM 5 MG/1
5 TABLET ORAL DAILY
Status: DISCONTINUED | OUTPATIENT
Start: 2024-01-13 | End: 2024-01-15

## 2024-01-13 RX ADMIN — WARFARIN SODIUM 5 MG: 5 TABLET ORAL at 05:01

## 2024-01-13 RX ADMIN — ASPIRIN 81 MG CHEWABLE TABLET 81 MG: 81 TABLET CHEWABLE at 08:01

## 2024-01-13 RX ADMIN — ENOXAPARIN SODIUM 70 MG: 100 INJECTION SUBCUTANEOUS at 06:01

## 2024-01-13 RX ADMIN — ENOXAPARIN SODIUM 70 MG: 100 INJECTION SUBCUTANEOUS at 05:01

## 2024-01-13 NOTE — PROGRESS NOTES
Family Medicine  Progress Note    Patient Name: Rhonda Ahuja  MRN: [id number]  Patient Class: IP- Inpatient   Admission Date: 1/10/2024  Length of Stay: 1 days  Attending Physician: Gem Reilly MD  Primary Care Provider: Rm Simpson MD        Subjective:     Principal Problem:Splenic infarct      HPI:  Patient is a 44 y.o.-year-old female w/ PMHx CVA in September 23 of this year, currently seeing heme/onc (Dr. Watts) for workup for APLS who presents to the ED with reports of abdominal pain since Sarbjit night. The pain started with a left upper quadrant abdominal pain which started radiating to left lower quadrant and now left flank pain. She denies any pain associated with meals, she denies any change in diet. She denies fevers, chills, headache, chest pain, shortness of breath. She denies constipation, diarrhea, hematuria, bloody stools. She has a history of 2 C-sections and denies any abnormal bleeding or clotting that she's noticed. She denies any abnormalities in her menstrual cycle. She denies any family history of blood disorders or clotting disorders.      In the ED, initial vitals Temp 97.9F, /70, HR 86, RR 18, SpO2 98% on room air. CBC was WNL, CMP significant for hyponatremia of 130, CO2 of 18 Estimated Creatinine Clearance: 95.7 mL/min (based on SCr of 0.7 mg/dL).  CTA abdomen and pelvis showed abnormal enhancement of splenic region, concerning for splenic infarct. Trace fluid in subcapsular region of spleen and patent splenic artery and vein. EKG showed accelerated junctional rhythm, a prolonged QTc of 541.   In the ED patient was treated with morphine, zofran, 1L NS bolus. [facility] admitted patient for splenic infarct/APLS workup      Interval History: Patient had no acute events overnight. She denies fevers, chills, SOB, acute bleeding events. She states that her pain feels much better and is now tolerable with minimal medications. She still has LUQ pain on deep breaths  left flank not hurting as much and patient is able to ambulate. She states that the last time she needed medications was 2pm yesterday which was oral medication.     Review of Systems   Constitutional:  Negative for chills, fatigue and fever.   Respiratory:  Negative for shortness of breath.    Cardiovascular:  Negative for chest pain, palpitations and leg swelling.   Gastrointestinal:  Positive for abdominal pain (LUQ, LLQ). Negative for diarrhea, nausea and vomiting.   Genitourinary:  Negative for dysuria, hematuria and pelvic pain.   Musculoskeletal:  Positive for back pain (left flank (mild)).   Skin:  Negative for pallor and rash.   Neurological:  Negative for dizziness and headaches.   All other systems reviewed and are negative.    Objective:     Vital Signs (Most Recent):  Temp: 96.7 °F (35.9 °C) (01/13/24 1113)  Pulse: 77 (01/13/24 1200)  Resp: 18 (01/13/24 1113)  BP: 119/67 (01/13/24 1113)  SpO2: 95 % (01/13/24 1113) Vital Signs (24h Range):  Temp:  [96.7 °F (35.9 °C)-98.9 °F (37.2 °C)] 96.7 °F (35.9 °C)  Pulse:  [71-83] 77  Resp:  [17-20] 18  SpO2:  [95 %-96 %] 95 %  BP: (106-128)/(52-73) 119/67     Weight: 65.6 kg (144 lb 10 oz)  Body mass index is 24.82 kg/m².    Intake/Output Summary (Last 24 hours) at 1/13/2024 1242  Last data filed at 1/12/2024 1739  Gross per 24 hour   Intake 36.95 ml   Output --   Net 36.95 ml         Physical Exam  Vitals reviewed.   Constitutional:       General: She is not in acute distress.     Appearance: Normal appearance. She is not ill-appearing, toxic-appearing or diaphoretic.   HENT:      Head: Normocephalic and atraumatic.      Nose: No congestion or rhinorrhea.      Mouth/Throat:      Pharynx: No oropharyngeal exudate or posterior oropharyngeal erythema.   Eyes:      General: No scleral icterus.     Extraocular Movements: Extraocular movements intact.      Pupils: Pupils are equal, round, and reactive to light.   Cardiovascular:      Rate and Rhythm: Normal rate and  regular rhythm.      Heart sounds: Normal heart sounds. No murmur heard.     No friction rub. No gallop.   Pulmonary:      Effort: No respiratory distress.      Breath sounds: No stridor. No wheezing, rhonchi or rales.   Abdominal:      General: Abdomen is flat. Bowel sounds are normal. There is no distension.      Palpations: There is no mass.      Tenderness: There is abdominal tenderness (LUQ, LLQ). There is left CVA tenderness. There is no guarding.      Hernia: No hernia is present.   Musculoskeletal:      Cervical back: Normal range of motion.      Right lower leg: No edema.      Left lower leg: No edema.   Skin:     Coloration: Skin is not jaundiced.      Findings: No bruising or rash.   Neurological:      General: No focal deficit present.      Mental Status: She is alert and oriented to person, place, and time.             Significant Labs: All pertinent labs within the past 24 hours have been reviewed.  CBC:   Recent Labs   Lab 01/11/24  1254 01/13/24  0639   WBC 8.55 5.80   HGB 11.4* 11.6*   HCT 33.8* 33.9*    313     CMP:   Recent Labs   Lab 01/12/24  0508 01/13/24  0639    138   K 4.3 4.2    106   CO2 27 23    99   BUN 10 7   CREATININE 0.7 0.7   CALCIUM 9.4 9.4   PROT 6.1 6.3   ALBUMIN 3.1* 3.2*   BILITOT 0.1 0.2   ALKPHOS 45* 47*   AST 11 10   ALT 9* 7*   ANIONGAP 5* 9       Significant Imaging: I have reviewed all pertinent imaging results/findings within the past 24 hours.    Assessment/Plan:      * Splenic infarct  Patient with history of LUQ and LLQ pain radiating to left flank. Currently being worked up by heme/onc for APLS. CTA abdomen pelvis with evidence of splenic infarct.    Plan:  ASA  Consult heme/onc - believes it is likely APLS due to previous beta 2 glycoprotein and clinical history  Therapeutic lovenox until can bridge to warfarin   INR 1.7 on 1/13, Consult with pharmacy - recommends warfarin changed to 5.0 today and needs to be therapeutic INR of 2-3 for two  "days before discharging. Recommended bridging therapeutic lovenox at least 5 days (started 1/11) and until INR 2-3  Tylenol for mild pain, norco 5 for moderate pain, dilaudid 1 for intense pain  Zofran PRN for nausea  F/u cardiolipin, DRVVT, beta 2 glycoprotein  F/u CMV/EBV lab due to splenic infarct        Left sided abdominal pain  See "splenic infarct"    Antiphospholipid syndrome  See "splenic infarct"   Consulted Hematology/oncology    Prolonged Q-T interval on ECG  Results for orders placed or performed during the hospital encounter of 01/10/24   EKG 12-lead    Narrative    Test Reason : D73.5,    Vent. Rate : 100 BPM     Atrial Rate : 098 BPM     P-R Int : 000 ms          QRS Dur : 082 ms      QT Int : 420 ms       P-R-T Axes : 000 090 036 degrees     QTc Int : 541 ms    Normal sinus rhythm with 1st degree A-V block  Rightward axis  Septal infarct ,age undetermined  Nonspecific T wave abnormality  Nonspecific ST abnormality  Abnormal ECG  When compared with ECG of 18-DEC-2023 15:53,  rate is faster and there are nonspecific STT changes  Confirmed by Leroy Mancilla MD ([id number]) on 1/10/2024 4:37:01 PM    Referred By: [email or web address]   SELF           Confirmed By:Leroy Mancilla MD     On admit patient with no chest pain, very prolonged QTc of 541    Plan:  Most recent EKG QTc of 401. Discontinue tele.   Monitor electrolytes, replete PRN    History of cerebrovascular accident (CVA) due to embolism  Patient with history of CVA in September, no current headache or neurological symptoms. Being worked up for APLS    Plan:  ASA, statin      HLD (hyperlipidemia)  Lab Results   Component Value Date    CHOL 222 (H) 12/18/2023    CHOL 277 (H) 09/23/2023     Lab Results   Component Value Date    HDL 54 12/18/2023    HDL 55 09/23/2023     Lab Results   Component Value Date    LDLCALC 155.6 12/18/2023    LDLCALC 200.0 (H) 09/23/2023     Lab Results   Component Value Date    TRIG 62 12/18/2023    TRIG 110 09/23/2023       Lab " Results   Component Value Date    CHOLHDL 24.3 12/18/2023    CHOLHDL 19.9 (L) 09/23/2023     Patient with history of hyperlipemia and history of stroke    Plan:  Atorvastatin 40        VTE Risk Mitigation (From admission, onward)           Ordered     warfarin (COUMADIN) tablet 5 mg  Daily         01/13/24 0909     enoxaparin injection 70 mg  Every 12 hours         01/11/24 0353     Reason for No Pharmacological VTE Prophylaxis  Once        Question:  Reasons:  Answer:  Physician Provided (leave comment)  Comment:  will do anticoagulation    01/10/24 1559     IP VTE HIGH RISK PATIENT  Once         01/10/24 1559     Place sequential compression device  Until discontinued         01/10/24 1559                    Discharge Planning   THA:      Code Status: Full Code   Is the patient medically ready for discharge?:     Reason for patient still in hospital (select all that apply): Patient trending condition  Discharge Plan A: Home, Home with family        Hugh Arguello MD  [facility], PGY-1  01/13/2024

## 2024-01-13 NOTE — SUBJECTIVE & OBJECTIVE
Interval History: Patient had no acute events overnight. She denies fevers, chills, SOB, acute bleeding events. She states that her pain feels much better and is now tolerable with minimal medications. She still has LUQ pain on deep breaths left flank not hurting as much and patient is able to ambulate. She states that the last time she needed medications was 2pm yesterday which was oral medication.     Review of Systems   Constitutional:  Negative for chills, fatigue and fever.   Respiratory:  Negative for shortness of breath.    Cardiovascular:  Negative for chest pain, palpitations and leg swelling.   Gastrointestinal:  Positive for abdominal pain (LUQ, LLQ). Negative for diarrhea, nausea and vomiting.   Genitourinary:  Negative for dysuria, hematuria and pelvic pain.   Musculoskeletal:  Positive for back pain (left flank (mild)).   Skin:  Negative for pallor and rash.   Neurological:  Negative for dizziness and headaches.   All other systems reviewed and are negative.    Objective:     Vital Signs (Most Recent):  Temp: 96.7 °F (35.9 °C) (01/13/24 1113)  Pulse: 77 (01/13/24 1200)  Resp: 18 (01/13/24 1113)  BP: 119/67 (01/13/24 1113)  SpO2: 95 % (01/13/24 1113) Vital Signs (24h Range):  Temp:  [96.7 °F (35.9 °C)-98.9 °F (37.2 °C)] 96.7 °F (35.9 °C)  Pulse:  [71-83] 77  Resp:  [17-20] 18  SpO2:  [95 %-96 %] 95 %  BP: (106-128)/(52-73) 119/67     Weight: 65.6 kg (144 lb 10 oz)  Body mass index is 24.82 kg/m².    Intake/Output Summary (Last 24 hours) at 1/13/2024 1242  Last data filed at 1/12/2024 1739  Gross per 24 hour   Intake 36.95 ml   Output --   Net 36.95 ml         Physical Exam  Vitals reviewed.   Constitutional:       General: She is not in acute distress.     Appearance: Normal appearance. She is not ill-appearing, toxic-appearing or diaphoretic.   HENT:      Head: Normocephalic and atraumatic.      Nose: No congestion or rhinorrhea.      Mouth/Throat:      Pharynx: No oropharyngeal exudate or posterior  oropharyngeal erythema.   Eyes:      General: No scleral icterus.     Extraocular Movements: Extraocular movements intact.      Pupils: Pupils are equal, round, and reactive to light.   Cardiovascular:      Rate and Rhythm: Normal rate and regular rhythm.      Heart sounds: Normal heart sounds. No murmur heard.     No friction rub. No gallop.   Pulmonary:      Effort: No respiratory distress.      Breath sounds: No stridor. No wheezing, rhonchi or rales.   Abdominal:      General: Abdomen is flat. Bowel sounds are normal. There is no distension.      Palpations: There is no mass.      Tenderness: There is abdominal tenderness (LUQ, LLQ). There is left CVA tenderness. There is no guarding.      Hernia: No hernia is present.   Musculoskeletal:      Cervical back: Normal range of motion.      Right lower leg: No edema.      Left lower leg: No edema.   Skin:     Coloration: Skin is not jaundiced.      Findings: No bruising or rash.   Neurological:      General: No focal deficit present.      Mental Status: She is alert and oriented to person, place, and time.             Significant Labs: All pertinent labs within the past 24 hours have been reviewed.  CBC:   Recent Labs   Lab 01/11/24  1254 01/13/24  0639   WBC 8.55 5.80   HGB 11.4* 11.6*   HCT 33.8* 33.9*    313     CMP:   Recent Labs   Lab 01/12/24  0508 01/13/24  0639    138   K 4.3 4.2    106   CO2 27 23    99   BUN 10 7   CREATININE 0.7 0.7   CALCIUM 9.4 9.4   PROT 6.1 6.3   ALBUMIN 3.1* 3.2*   BILITOT 0.1 0.2   ALKPHOS 45* 47*   AST 11 10   ALT 9* 7*   ANIONGAP 5* 9       Significant Imaging: I have reviewed all pertinent imaging results/findings within the past 24 hours.

## 2024-01-13 NOTE — ASSESSMENT & PLAN NOTE
Results for orders placed or performed during the hospital encounter of 01/10/24   EKG 12-lead    Narrative    Test Reason : D73.5,    Vent. Rate : 100 BPM     Atrial Rate : 098 BPM     P-R Int : 000 ms          QRS Dur : 082 ms      QT Int : 420 ms       P-R-T Axes : 000 090 036 degrees     QTc Int : 541 ms    Normal sinus rhythm with 1st degree A-V block  Rightward axis  Septal infarct ,age undetermined  Nonspecific T wave abnormality  Nonspecific ST abnormality  Abnormal ECG  When compared with ECG of 18-DEC-2023 15:53,  rate is faster and there are nonspecific STT changes  Confirmed by Leroy Mancilla MD ([id number]) on 1/10/2024 4:37:01 PM    Referred By: [id number]   SELF           Confirmed By:Leroy Mancilla MD     On admit patient with no chest pain, very prolonged QTc of 541    Plan:  Most recent EKG QTc of 401. Discontinue tele.   Monitor electrolytes, replete PRN

## 2024-01-13 NOTE — HOSPITAL COURSE
HPI as above. Patient was admitted and evaluated to have a splenic infarct. She was started on therapeutic lovenox and started on a pain regimen. Due to her APLS workup and early CVA this year in September of 2023, consulted Hematology/oncology and patient had been seeing Dr. Hernandez. Dr. Hernandez saw patient and recommended continuing anticoagulation with DOAC and bridging to warfarin with close follow up with coumadin clinic on discharge. Pharmacy closely followed for warfarin titration.  At time of discharge, patient was hemodynamically stable. She was on bridging lovenox for 8 days, with INR being between 2-3 for the last two of those days. Pain was resolved with patient requiring no pain medications the last two days of hospital stay. Patient was discharged on Warfarin 5mg with instructions to follow up with coumadin clinic. She was also given referral to cardiology. Hematology appointment was made for 2/7/24. Patient will need to follow up with PCP for hospital D/C Follow up. Patient agreeable to discharge plan, expressed understanding, all questions answered, return precautions were discussed.

## 2024-01-13 NOTE — PLAN OF CARE
Problem: Adult Inpatient Plan of Care  Goal: Plan of Care Review  1/13/2024 0622 by Rizwan Antonio RN  Outcome: Ongoing, Progressing  Flowsheets (Taken 1/13/2024 0622)  Plan of Care Reviewed With: patient  1/13/2024 0607 by Rizwan Antonio RN  Outcome: Ongoing, Progressing  Flowsheets (Taken 1/13/2024 0607)  Plan of Care Reviewed With: patient     Problem: Pain Acute  Goal: Acceptable Pain Control and Functional Ability  Outcome: Ongoing, Progressing  Intervention: Develop Pain Management Plan  Flowsheets (Taken 1/13/2024 0607)  Pain Management Interventions: pain management plan reviewed with patient/caregiver     Problem: Fall Injury Risk  Goal: Absence of Fall and Fall-Related Injury  Outcome: Ongoing, Progressing  Intervention: Identify and Manage Contributors  Flowsheets (Taken 1/13/2024 0622)  Medication Review/Management: medications reviewed     Problem: VTE (Venous Thromboembolism)  Goal: VTE (Venous Thromboembolism) Symptom Resolution  Outcome: Ongoing, Progressing  Intervention: Prevent or Manage VTE (Venous Thromboembolism)  Flowsheets (Taken 1/13/2024 0607)  VTE Prevention/Management:   bleeding risk factor(s) identified, provider notified   bleeding precations maintained

## 2024-01-13 NOTE — PLAN OF CARE
Problem: Adult Inpatient Plan of Care  Goal: Plan of Care Review  Outcome: Ongoing, Progressing  Flowsheets (Taken 1/13/2024 0607)  Plan of Care Reviewed With: patient     Problem: Pain Acute  Goal: Acceptable Pain Control and Functional Ability  Intervention: Develop Pain Management Plan  Flowsheets (Taken 1/13/2024 0607)  Pain Management Interventions: pain management plan reviewed with patient/caregiver     Problem: VTE (Venous Thromboembolism)  Goal: VTE (Venous Thromboembolism) Symptom Resolution  Outcome: Ongoing, Progressing  Intervention: Prevent or Manage VTE (Venous Thromboembolism)  Flowsheets (Taken 1/13/2024 0607)  VTE Prevention/Management:   bleeding risk factor(s) identified, provider notified   bleeding precations maintained

## 2024-01-13 NOTE — ASSESSMENT & PLAN NOTE
Patient with history of LUQ and LLQ pain radiating to left flank. Currently being worked up by heme/onc for APLS. CTA abdomen pelvis with evidence of splenic infarct.    Plan:  ASA  Consult heme/onc - believes it is likely APLS due to previous beta 2 glycoprotein and clinical history  Therapeutic lovenox until can bridge to warfarin   INR 1.7 on 1/13, Consult with pharmacy - recommends warfarin changed to 5.0 today and needs to be therapeutic INR of 2-3 for two days before discharging. Recommended bridging therapeutic lovenox at least 5 days (started 1/11) and until INR 2-3  Tylenol for mild pain, norco 5 for moderate pain, dilaudid 1 for intense pain  Zofran PRN for nausea  F/u cardiolipin, DRVVT, beta 2 glycoprotein  F/u CMV/EBV lab due to splenic infarct

## 2024-01-14 LAB
ALBUMIN SERPL BCP-MCNC: 3.3 G/DL (ref 3.5–5.2)
ALP SERPL-CCNC: 47 U/L (ref 55–135)
ALT SERPL W/O P-5'-P-CCNC: 13 U/L (ref 10–44)
ANION GAP SERPL CALC-SCNC: 9 MMOL/L (ref 8–16)
AST SERPL-CCNC: 16 U/L (ref 10–40)
BASOPHILS # BLD AUTO: 0.09 K/UL (ref 0–0.2)
BASOPHILS # BLD AUTO: 0.09 K/UL (ref 0–0.2)
BASOPHILS NFR BLD: 1.2 % (ref 0–1.9)
BASOPHILS NFR BLD: 1.5 % (ref 0–1.9)
BILIRUB SERPL-MCNC: 0.1 MG/DL (ref 0.1–1)
BUN SERPL-MCNC: 12 MG/DL (ref 6–20)
CALCIUM SERPL-MCNC: 9.6 MG/DL (ref 8.7–10.5)
CHLORIDE SERPL-SCNC: 107 MMOL/L (ref 95–110)
CO2 SERPL-SCNC: 24 MMOL/L (ref 23–29)
CREAT SERPL-MCNC: 0.7 MG/DL (ref 0.5–1.4)
DIFFERENTIAL METHOD BLD: ABNORMAL
DIFFERENTIAL METHOD BLD: ABNORMAL
EOSINOPHIL # BLD AUTO: 0.2 K/UL (ref 0–0.5)
EOSINOPHIL # BLD AUTO: 0.2 K/UL (ref 0–0.5)
EOSINOPHIL NFR BLD: 2 % (ref 0–8)
EOSINOPHIL NFR BLD: 3.5 % (ref 0–8)
ERYTHROCYTE [DISTWIDTH] IN BLOOD BY AUTOMATED COUNT: 11.9 % (ref 11.5–14.5)
ERYTHROCYTE [DISTWIDTH] IN BLOOD BY AUTOMATED COUNT: 11.9 % (ref 11.5–14.5)
EST. GFR  (NO RACE VARIABLE): >60 ML/MIN/1.73 M^2
GLUCOSE SERPL-MCNC: 113 MG/DL (ref 70–110)
HCT VFR BLD AUTO: 34.9 % (ref 37–48.5)
HCT VFR BLD AUTO: 35.5 % (ref 37–48.5)
HGB BLD-MCNC: 11.6 G/DL (ref 12–16)
HGB BLD-MCNC: 12.1 G/DL (ref 12–16)
IMM GRANULOCYTES # BLD AUTO: 0.01 K/UL (ref 0–0.04)
IMM GRANULOCYTES # BLD AUTO: 0.02 K/UL (ref 0–0.04)
IMM GRANULOCYTES NFR BLD AUTO: 0.2 % (ref 0–0.5)
IMM GRANULOCYTES NFR BLD AUTO: 0.3 % (ref 0–0.5)
INR PPP: 1.9 (ref 0.8–1.2)
LYMPHOCYTES # BLD AUTO: 1.9 K/UL (ref 1–4.8)
LYMPHOCYTES # BLD AUTO: 2.4 K/UL (ref 1–4.8)
LYMPHOCYTES NFR BLD: 31.8 % (ref 18–48)
LYMPHOCYTES NFR BLD: 32.4 % (ref 18–48)
MAGNESIUM SERPL-MCNC: 2.1 MG/DL (ref 1.6–2.6)
MCH RBC QN AUTO: 30.8 PG (ref 27–31)
MCH RBC QN AUTO: 31.3 PG (ref 27–31)
MCHC RBC AUTO-ENTMCNC: 33.2 G/DL (ref 32–36)
MCHC RBC AUTO-ENTMCNC: 34.1 G/DL (ref 32–36)
MCV RBC AUTO: 92 FL (ref 82–98)
MCV RBC AUTO: 93 FL (ref 82–98)
MONOCYTES # BLD AUTO: 0.6 K/UL (ref 0.3–1)
MONOCYTES # BLD AUTO: 0.6 K/UL (ref 0.3–1)
MONOCYTES NFR BLD: 10.1 % (ref 4–15)
MONOCYTES NFR BLD: 8.5 % (ref 4–15)
NEUTROPHILS # BLD AUTO: 3.1 K/UL (ref 1.8–7.7)
NEUTROPHILS # BLD AUTO: 4.2 K/UL (ref 1.8–7.7)
NEUTROPHILS NFR BLD: 52.3 % (ref 38–73)
NEUTROPHILS NFR BLD: 56.2 % (ref 38–73)
NRBC BLD-RTO: 0 /100 WBC
NRBC BLD-RTO: 0 /100 WBC
PHOSPHATE SERPL-MCNC: 3.9 MG/DL (ref 2.7–4.5)
PLATELET # BLD AUTO: 338 K/UL (ref 150–450)
PLATELET # BLD AUTO: 355 K/UL (ref 150–450)
PMV BLD AUTO: 9.1 FL (ref 9.2–12.9)
PMV BLD AUTO: 9.3 FL (ref 9.2–12.9)
POTASSIUM SERPL-SCNC: 4.4 MMOL/L (ref 3.5–5.1)
PROT SERPL-MCNC: 6.5 G/DL (ref 6–8.4)
PROTHROMBIN TIME: 19.9 SEC (ref 9–12.5)
RBC # BLD AUTO: 3.77 M/UL (ref 4–5.4)
RBC # BLD AUTO: 3.86 M/UL (ref 4–5.4)
SODIUM SERPL-SCNC: 140 MMOL/L (ref 136–145)
WBC # BLD AUTO: 5.95 K/UL (ref 3.9–12.7)
WBC # BLD AUTO: 7.41 K/UL (ref 3.9–12.7)

## 2024-01-14 PROCEDURE — 85025 COMPLETE CBC W/AUTO DIFF WBC: CPT | Mod: 91 | Performed by: STUDENT IN AN ORGANIZED HEALTH CARE EDUCATION/TRAINING PROGRAM

## 2024-01-14 PROCEDURE — 80053 COMPREHEN METABOLIC PANEL: CPT

## 2024-01-14 PROCEDURE — 83735 ASSAY OF MAGNESIUM: CPT

## 2024-01-14 PROCEDURE — 36415 COLL VENOUS BLD VENIPUNCTURE: CPT

## 2024-01-14 PROCEDURE — 63600175 PHARM REV CODE 636 W HCPCS: Performed by: STUDENT IN AN ORGANIZED HEALTH CARE EDUCATION/TRAINING PROGRAM

## 2024-01-14 PROCEDURE — 84100 ASSAY OF PHOSPHORUS: CPT

## 2024-01-14 PROCEDURE — 25000003 PHARM REV CODE 250

## 2024-01-14 PROCEDURE — 85610 PROTHROMBIN TIME: CPT

## 2024-01-14 PROCEDURE — 11000001 HC ACUTE MED/SURG PRIVATE ROOM

## 2024-01-14 PROCEDURE — 85025 COMPLETE CBC W/AUTO DIFF WBC: CPT

## 2024-01-14 PROCEDURE — 36415 COLL VENOUS BLD VENIPUNCTURE: CPT | Mod: XB | Performed by: STUDENT IN AN ORGANIZED HEALTH CARE EDUCATION/TRAINING PROGRAM

## 2024-01-14 RX ADMIN — ASPIRIN 81 MG CHEWABLE TABLET 81 MG: 81 TABLET CHEWABLE at 08:01

## 2024-01-14 RX ADMIN — WARFARIN SODIUM 5 MG: 5 TABLET ORAL at 05:01

## 2024-01-14 RX ADMIN — ENOXAPARIN SODIUM 70 MG: 100 INJECTION SUBCUTANEOUS at 05:01

## 2024-01-14 RX ADMIN — ENOXAPARIN SODIUM 70 MG: 100 INJECTION SUBCUTANEOUS at 04:01

## 2024-01-14 NOTE — PROGRESS NOTES
[facility] Medicine  Progress Note    Patient Name: Rhonda Ahuja  MRN: [id number]  Patient Class: IP- Inpatient   Admission Date: 1/10/2024  Length of Stay: 2 days  Attending Physician: Gem Reilly MD  Primary Care Provider: Rm Simpson MD        Subjective:     Principal Problem:Splenic infarct        HPI:  Patient is a 44 y.o.-year-old female w/ PMHx CVA in September 23 of this year, currently seeing heme/onc (Dr. Watts) for workup for APLS who presents to the ED with reports of abdominal pain since Sarbjit night. The pain started with a left upper quadrant abdominal pain which started radiating to left lower quadrant and now left flank pain. She denies any pain associated with meals, she denies any change in diet. She denies fevers, chills, headache, chest pain, shortness of breath. She denies constipation, diarrhea, hematuria, bloody stools. She has a history of 2 C-sections and denies any abnormal bleeding or clotting that she's noticed. She denies any abnormalities in her menstrual cycle. She denies any family history of blood disorders or clotting disorders.      In the ED, initial vitals Temp 97.9F, /70, HR 86, RR 18, SpO2 98% on room air. CBC was WNL, CMP significant for hyponatremia of 130, CO2 of 18 Estimated Creatinine Clearance: 95.7 mL/min (based on SCr of 0.7 mg/dL).  CTA abdomen and pelvis showed abnormal enhancement of splenic region, concerning for splenic infarct. Trace fluid in subcapsular region of spleen and patent splenic artery and vein. EKG showed accelerated junctional rhythm, a prolonged QTc of 541.   In the ED patient was treated with morphine, zofran, 1L NS bolus. [facility] admitted patient for splenic infarct/APLS workup    Overview/Hospital Course:  Patient admitted - evaluated to have a splenic infarct - started on therapeutic lovenox and started on a pain regimen.  Due to history of APLS workup and early CVA this year in 2023, consulted  hematology/oncology Dr. Hernandez who she had seen.  He recommended bridging to warfarin, started on warfarin 7.5, INR went to 1.7 first day. Pharmacy onboard and consulted on bridging to warfarin. Changed dose to 5 warfarin when INR was 1.7, recommended at least 5 days of therapeutic lovenix and 2 days of therapeutic INR levels before discharging.     Interval History: INR 1.9 today (1.7), goal 2-3. Continue LUQ abdominal pain worse with inspiration. Likely inflammatory/pleuritic.     Review of Systems   Constitutional:  Negative for chills, fatigue and fever.   Respiratory:  Negative for shortness of breath.    Cardiovascular:  Negative for chest pain, palpitations and leg swelling.   Gastrointestinal:  Positive for abdominal pain (LUQ, LLQ). Negative for diarrhea, nausea and vomiting.   Genitourinary:  Negative for dysuria, hematuria and pelvic pain.   Musculoskeletal:  Positive for back pain (left flank (mild)).   Skin:  Negative for pallor and rash.   Neurological:  Negative for dizziness and headaches.   All other systems reviewed and are negative.    Objective:     Vital Signs (Most Recent):  Temp: 97.5 °F (36.4 °C) (01/14/24 0735)  Pulse: 86 (01/14/24 0800)  Resp: 18 (01/14/24 0735)  BP: (!) 137/91 (01/14/24 0735)  SpO2: 97 % (01/14/24 0735) Vital Signs (24h Range):  Temp:  [97.5 °F (36.4 °C)-99 °F (37.2 °C)] 97.5 °F (36.4 °C)  Pulse:  [69-87] 86  Resp:  [18-20] 18  SpO2:  [93 %-97 %] 97 %  BP: (106-137)/(58-91) 137/91     Weight: 65.5 kg (144 lb 6.4 oz)  Body mass index is 24.79 kg/m².    Intake/Output Summary (Last 24 hours) at 1/14/2024 1134  Last data filed at 1/13/2024 2228  Gross per 24 hour   Intake 120 ml   Output --   Net 120 ml         Physical Exam  Vitals reviewed.   Constitutional:       General: She is not in acute distress.     Appearance: Normal appearance. She is not ill-appearing, toxic-appearing or diaphoretic.   HENT:      Head: Normocephalic and atraumatic.      Nose: No congestion or  rhinorrhea.      Mouth/Throat:      Pharynx: No oropharyngeal exudate or posterior oropharyngeal erythema.   Eyes:      General: No scleral icterus.     Extraocular Movements: Extraocular movements intact.      Pupils: Pupils are equal, round, and reactive to light.   Cardiovascular:      Rate and Rhythm: Normal rate and regular rhythm.      Heart sounds: Normal heart sounds. No murmur heard.     No friction rub. No gallop.   Pulmonary:      Effort: No respiratory distress.      Breath sounds: No stridor. No wheezing, rhonchi or rales.   Abdominal:      General: Abdomen is flat. Bowel sounds are normal. There is no distension.      Palpations: There is no mass.      Tenderness: There is abdominal tenderness (LUQ, LLQ). There is left CVA tenderness. There is no guarding.      Hernia: No hernia is present.   Musculoskeletal:      Cervical back: Normal range of motion.      Right lower leg: No edema.      Left lower leg: No edema.   Skin:     Coloration: Skin is not jaundiced.      Findings: No bruising or rash.   Neurological:      General: No focal deficit present.      Mental Status: She is alert and oriented to person, place, and time.             Significant Labs: All pertinent labs within the past 24 hours have been reviewed.    Significant Imaging: I have reviewed all pertinent imaging results/findings within the past 24 hours.    Assessment/Plan:      * Splenic infarct  Patient with history of LUQ and LLQ pain radiating to left flank. Currently being worked up by heme/onc for APLS. CTA abdomen pelvis with evidence of splenic infarct.    Plan:  ASA  Consult heme/onc - believes it is likely APLS due to previous beta 2 glycoprotein and clinical history  Therapeutic lovenox until can bridge to warfarin   INR 1.9 today (1.7), Goal 2-3   Consult with pharmacy - Continue 5 mg today, needs to be therapeutic INR of 2-3 for two days before discharging. Recommended bridging therapeutic lovenox at least 5 days (started  "1/11)   Tylenol for mild pain, norco 5 for moderate pain, dilaudid 1 for intense pain  Zofran PRN for nausea  F/u cardiolipin, DRVVT, beta 2 glycoprotein  F/u CMV/EBV lab due to splenic infarct        Left sided abdominal pain  See "splenic infarct"    Antiphospholipid syndrome  See "splenic infarct"   Consulted Hematology/oncology    History of cerebrovascular accident (CVA) due to embolism  Patient with history of CVA in September, no current headache or neurological symptoms. Being worked up for APLS    Plan:  ASA, statin      HLD (hyperlipidemia)  Lab Results   Component Value Date    CHOL 222 (H) 12/18/2023    CHOL 277 (H) 09/23/2023     Lab Results   Component Value Date    HDL 54 12/18/2023    HDL 55 09/23/2023     Lab Results   Component Value Date    LDLCALC 155.6 12/18/2023    LDLCALC 200.0 (H) 09/23/2023     Lab Results   Component Value Date    TRIG 62 12/18/2023    TRIG 110 09/23/2023       Lab Results   Component Value Date    CHOLHDL 24.3 12/18/2023    CHOLHDL 19.9 (L) 09/23/2023     Patient with history of hyperlipemia and history of stroke    Plan:  Atorvastatin 40        VTE Risk Mitigation (From admission, onward)           Ordered     warfarin (COUMADIN) tablet 5 mg  Daily         01/13/24 0909     enoxaparin injection 70 mg  Every 12 hours         01/11/24 0353     Reason for No Pharmacological VTE Prophylaxis  Once        Question:  Reasons:  Answer:  Physician Provided (leave comment)  Comment:  will do anticoagulation    01/10/24 1559     IP VTE HIGH RISK PATIENT  Once         01/10/24 1559     Place sequential compression device  Until discontinued         01/10/24 1559                    Discharge Planning   THA:      Code Status: Full Code   Is the patient medically ready for discharge?:     Reason for patient still in hospital (select all that apply): Laboratory test and Treatment  Discharge Plan A: Home, Home with family                  Nadeem Nayak, DO  Department of Hospital " [facility]

## 2024-01-14 NOTE — SUBJECTIVE & OBJECTIVE
Interval History: INR 1.9 today (1.7), goal 2-3. Continue LUQ abdominal pain worse with inspiration. Likely inflammatory/pleuritic.     Review of Systems   Constitutional:  Negative for chills, fatigue and fever.   Respiratory:  Negative for shortness of breath.    Cardiovascular:  Negative for chest pain, palpitations and leg swelling.   Gastrointestinal:  Positive for abdominal pain (LUQ, LLQ). Negative for diarrhea, nausea and vomiting.   Genitourinary:  Negative for dysuria, hematuria and pelvic pain.   Musculoskeletal:  Positive for back pain (left flank (mild)).   Skin:  Negative for pallor and rash.   Neurological:  Negative for dizziness and headaches.   All other systems reviewed and are negative.    Objective:     Vital Signs (Most Recent):  Temp: 97.5 °F (36.4 °C) (01/14/24 0735)  Pulse: 86 (01/14/24 0800)  Resp: 18 (01/14/24 0735)  BP: (!) 137/91 (01/14/24 0735)  SpO2: 97 % (01/14/24 0735) Vital Signs (24h Range):  Temp:  [97.5 °F (36.4 °C)-99 °F (37.2 °C)] 97.5 °F (36.4 °C)  Pulse:  [69-87] 86  Resp:  [18-20] 18  SpO2:  [93 %-97 %] 97 %  BP: (106-137)/(58-91) 137/91     Weight: 65.5 kg (144 lb 6.4 oz)  Body mass index is 24.79 kg/m².    Intake/Output Summary (Last 24 hours) at 1/14/2024 1134  Last data filed at 1/13/2024 2228  Gross per 24 hour   Intake 120 ml   Output --   Net 120 ml         Physical Exam  Vitals reviewed.   Constitutional:       General: She is not in acute distress.     Appearance: Normal appearance. She is not ill-appearing, toxic-appearing or diaphoretic.   HENT:      Head: Normocephalic and atraumatic.      Nose: No congestion or rhinorrhea.      Mouth/Throat:      Pharynx: No oropharyngeal exudate or posterior oropharyngeal erythema.   Eyes:      General: No scleral icterus.     Extraocular Movements: Extraocular movements intact.      Pupils: Pupils are equal, round, and reactive to light.   Cardiovascular:      Rate and Rhythm: Normal rate and regular rhythm.      Heart sounds:  Normal heart sounds. No murmur heard.     No friction rub. No gallop.   Pulmonary:      Effort: No respiratory distress.      Breath sounds: No stridor. No wheezing, rhonchi or rales.   Abdominal:      General: Abdomen is flat. Bowel sounds are normal. There is no distension.      Palpations: There is no mass.      Tenderness: There is abdominal tenderness (LUQ, LLQ). There is left CVA tenderness. There is no guarding.      Hernia: No hernia is present.   Musculoskeletal:      Cervical back: Normal range of motion.      Right lower leg: No edema.      Left lower leg: No edema.   Skin:     Coloration: Skin is not jaundiced.      Findings: No bruising or rash.   Neurological:      General: No focal deficit present.      Mental Status: She is alert and oriented to person, place, and time.             Significant Labs: All pertinent labs within the past 24 hours have been reviewed.    Significant Imaging: I have reviewed all pertinent imaging results/findings within the past 24 hours.

## 2024-01-15 PROBLEM — R94.31 PROLONGED Q-T INTERVAL ON ECG: Status: ACTIVE | Noted: 2024-01-15

## 2024-01-15 LAB
ALBUMIN SERPL BCP-MCNC: 3.6 G/DL (ref 3.5–5.2)
ALP SERPL-CCNC: 52 U/L (ref 55–135)
ALT SERPL W/O P-5'-P-CCNC: 39 U/L (ref 10–44)
ANION GAP SERPL CALC-SCNC: 10 MMOL/L (ref 8–16)
APTT IMM NP PPP: NORMAL SEC (ref 32–48)
APTT P HEP NEUT PPP: NORMAL SEC (ref 32–48)
AST SERPL-CCNC: 37 U/L (ref 10–40)
B2 GLYCOPROT1 IGA SER QL: 1.9 U/ML
B2 GLYCOPROT1 IGG SER QL: 1.3 U/ML
B2 GLYCOPROT1 IGM SER QL: 53 U/ML
BASOPHILS # BLD AUTO: 0.1 K/UL (ref 0–0.2)
BASOPHILS NFR BLD: 1.5 % (ref 0–1.9)
BILIRUB SERPL-MCNC: 0.2 MG/DL (ref 0.1–1)
BUN SERPL-MCNC: 13 MG/DL (ref 6–20)
CALCIUM SERPL-MCNC: 10.1 MG/DL (ref 8.7–10.5)
CARDIOLIPIN IGA SER IA-ACNC: 1.8 APL
CHLORIDE SERPL-SCNC: 106 MMOL/L (ref 95–110)
CO2 SERPL-SCNC: 22 MMOL/L (ref 23–29)
CONFIRM APTT STACLOT: NORMAL
CREAT SERPL-MCNC: 0.7 MG/DL (ref 0.5–1.4)
DIFFERENTIAL METHOD BLD: ABNORMAL
DRVVT SCREEN TO CONFIRM RATIO: NORMAL RATIO
EOSINOPHIL # BLD AUTO: 0.2 K/UL (ref 0–0.5)
EOSINOPHIL NFR BLD: 3.2 % (ref 0–8)
ERYTHROCYTE [DISTWIDTH] IN BLOOD BY AUTOMATED COUNT: 11.9 % (ref 11.5–14.5)
EST. GFR  (NO RACE VARIABLE): >60 ML/MIN/1.73 M^2
GLUCOSE SERPL-MCNC: 97 MG/DL (ref 70–110)
HCT VFR BLD AUTO: 34.9 % (ref 37–48.5)
HGB BLD-MCNC: 12 G/DL (ref 12–16)
IMM GRANULOCYTES # BLD AUTO: 0.02 K/UL (ref 0–0.04)
IMM GRANULOCYTES NFR BLD AUTO: 0.3 % (ref 0–0.5)
INR PPP: 1.9 (ref 0.8–1.2)
LA 3 SCREEN W REFLEX-IMP: NORMAL
LA APTT+DRVVT+PT W REFLEX PPP: NORMAL
LYMPHOCYTES # BLD AUTO: 2 K/UL (ref 1–4.8)
LYMPHOCYTES NFR BLD: 30.4 % (ref 18–48)
MAGNESIUM SERPL-MCNC: 2 MG/DL (ref 1.6–2.6)
MCH RBC QN AUTO: 31.1 PG (ref 27–31)
MCHC RBC AUTO-ENTMCNC: 34.4 G/DL (ref 32–36)
MCV RBC AUTO: 90 FL (ref 82–98)
MIXING DRVVT: NORMAL SEC (ref 33–44)
MONOCYTES # BLD AUTO: 0.6 K/UL (ref 0.3–1)
MONOCYTES NFR BLD: 9.7 % (ref 4–15)
NEUTROPHILS # BLD AUTO: 3.6 K/UL (ref 1.8–7.7)
NEUTROPHILS NFR BLD: 54.9 % (ref 38–73)
NRBC BLD-RTO: 0 /100 WBC
PHOSPHATE SERPL-MCNC: 3.4 MG/DL (ref 2.7–4.5)
PLATELET # BLD AUTO: 340 K/UL (ref 150–450)
PMV BLD AUTO: 9.3 FL (ref 9.2–12.9)
POTASSIUM SERPL-SCNC: 4.4 MMOL/L (ref 3.5–5.1)
PROT SERPL-MCNC: 7 G/DL (ref 6–8.4)
PROTHROMBIN TIME: 12.9 SEC (ref 12–15.5)
PROTHROMBIN TIME: 19.7 SEC (ref 9–12.5)
RBC # BLD AUTO: 3.86 M/UL (ref 4–5.4)
REPTILASE TIME: NORMAL SEC
SCREEN APTT: 42 SEC (ref 32–48)
SCREEN DRVVT: 38 SEC (ref 33–44)
SODIUM SERPL-SCNC: 138 MMOL/L (ref 136–145)
THROMBIN TIME: NORMAL SEC (ref 14.7–19.5)
WBC # BLD AUTO: 6.62 K/UL (ref 3.9–12.7)

## 2024-01-15 PROCEDURE — 36415 COLL VENOUS BLD VENIPUNCTURE: CPT

## 2024-01-15 PROCEDURE — 25000003 PHARM REV CODE 250

## 2024-01-15 PROCEDURE — 63600175 PHARM REV CODE 636 W HCPCS: Performed by: STUDENT IN AN ORGANIZED HEALTH CARE EDUCATION/TRAINING PROGRAM

## 2024-01-15 PROCEDURE — 83735 ASSAY OF MAGNESIUM: CPT

## 2024-01-15 PROCEDURE — 85025 COMPLETE CBC W/AUTO DIFF WBC: CPT

## 2024-01-15 PROCEDURE — 85610 PROTHROMBIN TIME: CPT

## 2024-01-15 PROCEDURE — 84100 ASSAY OF PHOSPHORUS: CPT

## 2024-01-15 PROCEDURE — 80053 COMPREHEN METABOLIC PANEL: CPT

## 2024-01-15 PROCEDURE — 11000001 HC ACUTE MED/SURG PRIVATE ROOM

## 2024-01-15 RX ORDER — ASPIRIN 81 MG/1
81 TABLET ORAL DAILY
Status: DISCONTINUED | OUTPATIENT
Start: 2024-01-16 | End: 2024-01-17 | Stop reason: HOSPADM

## 2024-01-15 RX ORDER — ACETAMINOPHEN 325 MG/1
650 TABLET ORAL EVERY 6 HOURS PRN
Status: DISCONTINUED | OUTPATIENT
Start: 2024-01-15 | End: 2024-01-17 | Stop reason: HOSPADM

## 2024-01-15 RX ADMIN — ENOXAPARIN SODIUM 70 MG: 100 INJECTION SUBCUTANEOUS at 04:01

## 2024-01-15 RX ADMIN — WARFARIN SODIUM 7.5 MG: 5 TABLET ORAL at 04:01

## 2024-01-15 RX ADMIN — ASPIRIN 81 MG CHEWABLE TABLET 81 MG: 81 TABLET CHEWABLE at 08:01

## 2024-01-15 NOTE — ASSESSMENT & PLAN NOTE
"Patient with history of CVA in September, no current headache or neurological symptoms. Being worked up for APLS    Plan:  ASA  Patient declined statin - see "HLD"    "

## 2024-01-15 NOTE — ASSESSMENT & PLAN NOTE
Patient with history of LUQ and LLQ pain radiating to left flank. Currently being worked up by heme/onc for APLS. CTA abdomen pelvis with evidence of splenic infarct.    Plan:  ASA  Consult heme/onc - believes it is likely APLS due to previous beta 2 glycoprotein and clinical history  Therapeutic lovenox until can bridge to warfarin   INR 1.9 on 1/15 - unchanged x2 days. Consult with pharmacy - switched to warfarin 7.5mg today and needs to be therapeutic INR of 2-3 for two days before discharging. Recommended bridging therapeutic lovenox at least 5 days (started 1/11) and until INR 2-3  Patient instructed to avoid NSAIDs; Aspirin switched from chewable to enteric-coated  Pain improved, required no pain medication.  Zofran PRN for nausea  Beta 2 glycoprotein: Beta-2 Glyo 1 IgM: Elevated - 53, IgG & IgA WNL  DRVVT Normal  Cardiolipin antibody WNL  F/u CMV/EBV lab due to splenic infarct

## 2024-01-15 NOTE — SUBJECTIVE & OBJECTIVE
"Interval History: INR unchanged at 1.9 today. Warfarin increased from 5 to 7.5mg. Continued left thorax + LUQ pain with deep inspiration. Pain improved, no pain medications required. PRNs discontinued. Aspirin changed from chewable to enteric coated. Patient continues to refuse Atorvastatin.    Review of Systems   Constitutional:  Negative for chills and fever.   HENT:  Negative for sore throat and trouble swallowing.    Eyes:  Negative for visual disturbance.   Respiratory:  Negative for cough and shortness of breath.    Cardiovascular:  Negative for palpitations and leg swelling.   Gastrointestinal:  Positive for abdominal pain. Negative for constipation, nausea and vomiting.        1X small volume "mucus-y" BM. No blood.   Genitourinary:  Positive for flank pain. Negative for dysuria.   Musculoskeletal:  Negative for back pain, gait problem and joint swelling.   Neurological:  Negative for weakness.     Objective:     Vital Signs (Most Recent):  Temp: 96.8 °F (36 °C) (01/15/24 1109)  Pulse: 66 (01/15/24 1153)  Resp: 17 (01/15/24 1109)  BP: 118/78 (01/15/24 1109)  SpO2: 98 % (01/15/24 1109) Vital Signs (24h Range):  Temp:  [96.8 °F (36 °C)-98.4 °F (36.9 °C)] 96.8 °F (36 °C)  Pulse:  [62-78] 66  Resp:  [16-20] 17  SpO2:  [97 %-98 %] 98 %  BP: (106-118)/(55-78) 118/78     Weight: 65.5 kg (144 lb 6.4 oz)  Body mass index is 24.79 kg/m².    Intake/Output Summary (Last 24 hours) at 1/15/2024 1533  Last data filed at 1/15/2024 0342  Gross per 24 hour   Intake 120 ml   Output --   Net 120 ml         Physical Exam  Constitutional:       General: She is not in acute distress.     Appearance: Normal appearance.   Eyes:      General:         Right eye: No discharge.         Left eye: No discharge.      Conjunctiva/sclera: Conjunctivae normal.   Cardiovascular:      Rate and Rhythm: Normal rate and regular rhythm.      Pulses: Normal pulses.   Pulmonary:      Effort: Pulmonary effort is normal. No respiratory distress.      " Comments: Pain illicited on deep inspiration  Abdominal:      General: Bowel sounds are normal.      Tenderness: There is no guarding.      Comments: LUQ & left flank pain on palpation   Musculoskeletal:      Right lower leg: No edema.      Left lower leg: No edema.   Skin:     General: Skin is warm.      Coloration: Skin is not jaundiced.   Neurological:      Mental Status: She is alert and oriented to person, place, and time.      Motor: No weakness.      Gait: Gait normal.   Psychiatric:         Mood and Affect: Mood normal.         Behavior: Behavior normal.             Significant Labs: All pertinent labs within the past 24 hours have been reviewed.  CBC:   Recent Labs   Lab 01/14/24  0431 01/14/24  1256 01/15/24  0455   WBC 5.95 7.41 6.62   HGB 11.6* 12.1 12.0   HCT 34.9* 35.5* 34.9*    355 340     CMP:   Recent Labs   Lab 01/14/24  0432 01/15/24  0455    138   K 4.4 4.4    106   CO2 24 22*   * 97   BUN 12 13   CREATININE 0.7 0.7   CALCIUM 9.6 10.1   PROT 6.5 7.0   ALBUMIN 3.3* 3.6   BILITOT 0.1 0.2   ALKPHOS 47* 52*   AST 16 37   ALT 13 39   ANIONGAP 9 10       Significant Imaging: I have reviewed all pertinent imaging results/findings within the past 24 hours.

## 2024-01-15 NOTE — PROGRESS NOTES
[facility] Medicine  Progress Note    Patient Name: Rhonda Ahuja  MRN: [id number]  Patient Class: IP- Inpatient   Admission Date: 1/10/2024  Length of Stay: 3 days  Attending Physician: Gem Reilly MD  Primary Care Provider: Rm Simpson MD        Subjective:     Principal Problem:Splenic infarct        HPI:  Patient is a 44 y.o.-year-old female w/ PMHx CVA in September 23 of this year, currently seeing heme/onc (Dr. Watts) for workup for APLS who presents to the ED with reports of abdominal pain since Sarbjit night. The pain started with a left upper quadrant abdominal pain which started radiating to left lower quadrant and now left flank pain. She denies any pain associated with meals, she denies any change in diet. She denies fevers, chills, headache, chest pain, shortness of breath. She denies constipation, diarrhea, hematuria, bloody stools. She has a history of 2 C-sections and denies any abnormal bleeding or clotting that she's noticed. She denies any abnormalities in her menstrual cycle. She denies any family history of blood disorders or clotting disorders.      In the ED, initial vitals Temp 97.9F, /70, HR 86, RR 18, SpO2 98% on room air. CBC was WNL, CMP significant for hyponatremia of 130, CO2 of 18 Estimated Creatinine Clearance: 95.7 mL/min (based on SCr of 0.7 mg/dL).  CTA abdomen and pelvis showed abnormal enhancement of splenic region, concerning for splenic infarct. Trace fluid in subcapsular region of spleen and patent splenic artery and vein. EKG showed accelerated junctional rhythm, a prolonged QTc of 541.   In the ED patient was treated with morphine, zofran, 1L NS bolus. [facility] admitted patient for splenic infarct/APLS workup    Overview/Hospital Course:  HPI as above. Patient was admitted and evaluated to have a splenic infarct. She was started on therapeutic lovenox and started on a pain regimen. Due to her APLS workup and early CVA this year  "in September of 2023, consulted Hematology/oncology and patient had been seeing Dr. Hernandez. Dr. Hernandez saw patient and recommended continuing anticoagulation with DOAC and bridging to warfarin with close follow up with coumadin clinic on discharge. Pharmacy closely following for warfarin titration. Discussed with pharmacy and patient was started on warfarin 7.5 which raised her INR to 1.7 and warfarin dose decreased to 5. Pharmacy recommended at least 5 days of therapeutic lovenox (until 1/15) AND 2 days of therapeutic INR levels before discharging. Patient had increasing pain for the first two days and was controlled on pain regimen, and on day of handoff her pain was mostly resolved.     Interval History: INR unchanged at 1.9 today. Warfarin increased from 5 to 7.5mg. Continued left thorax + LUQ pain with deep inspiration. Pain improved, no pain medications required. PRNs discontinued. Aspirin changed from chewable to enteric coated. Patient continues to refuse Atorvastatin.    Review of Systems   Constitutional:  Negative for chills and fever.   HENT:  Negative for sore throat and trouble swallowing.    Eyes:  Negative for visual disturbance.   Respiratory:  Negative for cough and shortness of breath.    Cardiovascular:  Negative for palpitations and leg swelling.   Gastrointestinal:  Positive for abdominal pain. Negative for constipation, nausea and vomiting.        1X small volume "mucus-y" BM. No blood.   Genitourinary:  Positive for flank pain. Negative for dysuria.   Musculoskeletal:  Negative for back pain, gait problem and joint swelling.   Neurological:  Negative for weakness.     Objective:     Vital Signs (Most Recent):  Temp: 96.8 °F (36 °C) (01/15/24 1109)  Pulse: 66 (01/15/24 1153)  Resp: 17 (01/15/24 1109)  BP: 118/78 (01/15/24 1109)  SpO2: 98 % (01/15/24 1109) Vital Signs (24h Range):  Temp:  [96.8 °F (36 °C)-98.4 °F (36.9 °C)] 96.8 °F (36 °C)  Pulse:  [62-78] 66  Resp:  [16-20] 17  SpO2:  [97 " %-98 %] 98 %  BP: (106-118)/(55-78) 118/78     Weight: 65.5 kg (144 lb 6.4 oz)  Body mass index is 24.79 kg/m².    Intake/Output Summary (Last 24 hours) at 1/15/2024 1533  Last data filed at 1/15/2024 0342  Gross per 24 hour   Intake 120 ml   Output --   Net 120 ml         Physical Exam  Constitutional:       General: She is not in acute distress.     Appearance: Normal appearance.   Eyes:      General:         Right eye: No discharge.         Left eye: No discharge.      Conjunctiva/sclera: Conjunctivae normal.   Cardiovascular:      Rate and Rhythm: Normal rate and regular rhythm.      Pulses: Normal pulses.   Pulmonary:      Effort: Pulmonary effort is normal. No respiratory distress.      Comments: Pain illicited on deep inspiration  Abdominal:      General: Bowel sounds are normal.      Tenderness: There is no guarding.      Comments: LUQ & left flank pain on palpation   Musculoskeletal:      Right lower leg: No edema.      Left lower leg: No edema.   Skin:     General: Skin is warm.      Coloration: Skin is not jaundiced.   Neurological:      Mental Status: She is alert and oriented to person, place, and time.      Motor: No weakness.      Gait: Gait normal.   Psychiatric:         Mood and Affect: Mood normal.         Behavior: Behavior normal.             Significant Labs: All pertinent labs within the past 24 hours have been reviewed.  CBC:   Recent Labs   Lab 01/14/24  0431 01/14/24  1256 01/15/24  0455   WBC 5.95 7.41 6.62   HGB 11.6* 12.1 12.0   HCT 34.9* 35.5* 34.9*    355 340     CMP:   Recent Labs   Lab 01/14/24  0432 01/15/24  0455    138   K 4.4 4.4    106   CO2 24 22*   * 97   BUN 12 13   CREATININE 0.7 0.7   CALCIUM 9.6 10.1   PROT 6.5 7.0   ALBUMIN 3.3* 3.6   BILITOT 0.1 0.2   ALKPHOS 47* 52*   AST 16 37   ALT 13 39   ANIONGAP 9 10       Significant Imaging: I have reviewed all pertinent imaging results/findings within the past 24 hours.    Assessment/Plan:      * Splenic  "infarct  Patient with history of LUQ and LLQ pain radiating to left flank. Currently being worked up by heme/onc for APLS. CTA abdomen pelvis with evidence of splenic infarct.    Plan:  ASA  Consult heme/onc - believes it is likely APLS due to previous beta 2 glycoprotein and clinical history  Therapeutic lovenox until can bridge to warfarin   INR 1.9 on 1/15 - unchanged x2 days. Consult with pharmacy - switched to warfarin 7.5mg today and needs to be therapeutic INR of 2-3 for two days before discharging. Recommended bridging therapeutic lovenox at least 5 days (started 1/11) and until INR 2-3  Patient instructed to avoid NSAIDs; Aspirin switched from chewable to enteric-coated  Pain improved, required no pain medication.  Zofran PRN for nausea  Beta 2 glycoprotein: Beta-2 Glyo 1 IgM: Elevated - 53, IgG & IgA WNL  DRVVT Normal  Cardiolipin antibody WNL  F/u CMV/EBV lab due to splenic infarct        Antiphospholipid syndrome  See "splenic infarct"   Consulted Hematology/oncology    Left sided abdominal pain  See "splenic infarct"    HLD (hyperlipidemia)  Patient refusing Atorvastatin because she wishes to reduce cholesterol with lifestyle modifications & wishes to see coronary artery calcium score before making a decision to continue medication. 1/15/24 - Patient was informed that statins are recommended for long term prevention, especially with someone with hypercoagulability & history of CVA. Patient expressed understanding.    Lab Results   Component Value Date    CHOL 222 (H) 12/18/2023    CHOL 277 (H) 09/23/2023     Lab Results   Component Value Date    HDL 54 12/18/2023    HDL 55 09/23/2023     Lab Results   Component Value Date    LDLCALC 155.6 12/18/2023    LDLCALC 200.0 (H) 09/23/2023     Lab Results   Component Value Date    TRIG 62 12/18/2023    TRIG 110 09/23/2023       Lab Results   Component Value Date    CHOLHDL 24.3 12/18/2023    CHOLHDL 19.9 (L) 09/23/2023     Patient with history of hyperlipemia " "and history of stroke    Plan:  Atorvastatin 40      History of cerebrovascular accident (CVA) due to embolism  Patient with history of CVA in September, no current headache or neurological symptoms. Being worked up for APLS    Plan:  ASA  Patient declined statin - see "HLD"        VTE Risk Mitigation (From admission, onward)           Ordered     warfarin tablet 7.5 mg  Daily         01/15/24 1310     enoxaparin injection 70 mg  Every 12 hours         01/11/24 0353     Reason for No Pharmacological VTE Prophylaxis  Once        Question:  Reasons:  Answer:  Physician Provided (leave comment)  Comment:  will do anticoagulation    01/10/24 1559     IP VTE HIGH RISK PATIENT  Once         01/10/24 1559     Place sequential compression device  Until discontinued         01/10/24 1559                    Discharge Planning   THA:      Code Status: Full Code   Is the patient medically ready for discharge?:     Reason for patient still in hospital (select all that apply): Patient trending condition  Discharge Plan A: Home, Home with family            Ava Hill MD  Department of Hospital Medicine   [facility]    "

## 2024-01-15 NOTE — ASSESSMENT & PLAN NOTE
Patient refusing Atorvastatin because she wishes to reduce cholesterol with lifestyle modifications & wishes to see coronary artery calcium score before making a decision to continue medication. 1/15/24 - Patient was informed that statins are recommended for long term prevention, especially with someone with hypercoagulability & history of CVA. Patient expressed understanding.    Lab Results   Component Value Date    CHOL 222 (H) 12/18/2023    CHOL 277 (H) 09/23/2023     Lab Results   Component Value Date    HDL 54 12/18/2023    HDL 55 09/23/2023     Lab Results   Component Value Date    LDLCALC 155.6 12/18/2023    LDLCALC 200.0 (H) 09/23/2023     Lab Results   Component Value Date    TRIG 62 12/18/2023    TRIG 110 09/23/2023       Lab Results   Component Value Date    CHOLHDL 24.3 12/18/2023    CHOLHDL 19.9 (L) 09/23/2023     Patient with history of hyperlipemia and history of stroke    Plan:  Atorvastatin 40

## 2024-01-16 LAB
ALBUMIN SERPL BCP-MCNC: 3.7 G/DL (ref 3.5–5.2)
ALP SERPL-CCNC: 51 U/L (ref 55–135)
ALT SERPL W/O P-5'-P-CCNC: 51 U/L (ref 10–44)
ANION GAP SERPL CALC-SCNC: 10 MMOL/L (ref 8–16)
AST SERPL-CCNC: 41 U/L (ref 10–40)
BASOPHILS # BLD AUTO: 0.1 K/UL (ref 0–0.2)
BASOPHILS NFR BLD: 1.5 % (ref 0–1.9)
BILIRUB SERPL-MCNC: 0.2 MG/DL (ref 0.1–1)
BUN SERPL-MCNC: 12 MG/DL (ref 6–20)
CALCIUM SERPL-MCNC: 10.3 MG/DL (ref 8.7–10.5)
CHLORIDE SERPL-SCNC: 107 MMOL/L (ref 95–110)
CO2 SERPL-SCNC: 23 MMOL/L (ref 23–29)
CREAT SERPL-MCNC: 0.7 MG/DL (ref 0.5–1.4)
DIFFERENTIAL METHOD BLD: ABNORMAL
EOSINOPHIL # BLD AUTO: 0.2 K/UL (ref 0–0.5)
EOSINOPHIL NFR BLD: 3.1 % (ref 0–8)
ERYTHROCYTE [DISTWIDTH] IN BLOOD BY AUTOMATED COUNT: 11.9 % (ref 11.5–14.5)
EST. GFR  (NO RACE VARIABLE): >60 ML/MIN/1.73 M^2
GLUCOSE SERPL-MCNC: 98 MG/DL (ref 70–110)
HCT VFR BLD AUTO: 36.4 % (ref 37–48.5)
HGB BLD-MCNC: 12.4 G/DL (ref 12–16)
IMM GRANULOCYTES # BLD AUTO: 0 K/UL (ref 0–0.04)
IMM GRANULOCYTES NFR BLD AUTO: 0 % (ref 0–0.5)
INR PPP: 2 (ref 0.8–1.2)
LYMPHOCYTES # BLD AUTO: 1.9 K/UL (ref 1–4.8)
LYMPHOCYTES NFR BLD: 28.3 % (ref 18–48)
MAGNESIUM SERPL-MCNC: 2.1 MG/DL (ref 1.6–2.6)
MCH RBC QN AUTO: 31.3 PG (ref 27–31)
MCHC RBC AUTO-ENTMCNC: 34.1 G/DL (ref 32–36)
MCV RBC AUTO: 92 FL (ref 82–98)
MONOCYTES # BLD AUTO: 0.6 K/UL (ref 0.3–1)
MONOCYTES NFR BLD: 8.1 % (ref 4–15)
NEUTROPHILS # BLD AUTO: 4 K/UL (ref 1.8–7.7)
NEUTROPHILS NFR BLD: 59 % (ref 38–73)
NRBC BLD-RTO: 0 /100 WBC
PHOSPHATE SERPL-MCNC: 3.4 MG/DL (ref 2.7–4.5)
PLATELET # BLD AUTO: 360 K/UL (ref 150–450)
PMV BLD AUTO: 9.1 FL (ref 9.2–12.9)
POTASSIUM SERPL-SCNC: 4.6 MMOL/L (ref 3.5–5.1)
PROT SERPL-MCNC: 7.2 G/DL (ref 6–8.4)
PROTHROMBIN TIME: 21.1 SEC (ref 9–12.5)
RBC # BLD AUTO: 3.96 M/UL (ref 4–5.4)
SODIUM SERPL-SCNC: 140 MMOL/L (ref 136–145)
WBC # BLD AUTO: 6.82 K/UL (ref 3.9–12.7)

## 2024-01-16 PROCEDURE — 83735 ASSAY OF MAGNESIUM: CPT

## 2024-01-16 PROCEDURE — 85610 PROTHROMBIN TIME: CPT

## 2024-01-16 PROCEDURE — 11000001 HC ACUTE MED/SURG PRIVATE ROOM

## 2024-01-16 PROCEDURE — 99233 SBSQ HOSP IP/OBS HIGH 50: CPT | Mod: ,,, | Performed by: INTERNAL MEDICINE

## 2024-01-16 PROCEDURE — 36415 COLL VENOUS BLD VENIPUNCTURE: CPT

## 2024-01-16 PROCEDURE — 63600175 PHARM REV CODE 636 W HCPCS: Performed by: STUDENT IN AN ORGANIZED HEALTH CARE EDUCATION/TRAINING PROGRAM

## 2024-01-16 PROCEDURE — 85025 COMPLETE CBC W/AUTO DIFF WBC: CPT

## 2024-01-16 PROCEDURE — 25000003 PHARM REV CODE 250

## 2024-01-16 PROCEDURE — 80053 COMPREHEN METABOLIC PANEL: CPT

## 2024-01-16 PROCEDURE — 84100 ASSAY OF PHOSPHORUS: CPT

## 2024-01-16 RX ADMIN — ASPIRIN 81 MG: 81 TABLET, COATED ORAL at 09:01

## 2024-01-16 RX ADMIN — WARFARIN SODIUM 7.5 MG: 5 TABLET ORAL at 04:01

## 2024-01-16 RX ADMIN — ENOXAPARIN SODIUM 70 MG: 100 INJECTION SUBCUTANEOUS at 04:01

## 2024-01-16 RX ADMIN — ENOXAPARIN SODIUM 70 MG: 100 INJECTION SUBCUTANEOUS at 06:01

## 2024-01-16 NOTE — SUBJECTIVE & OBJECTIVE
VIRTUAL TELENOTE    Start time:10:00am  Chief complaint: splenic infarct  The patient location is: [address]  The patient arrived at: 10:00am  Present with the patient at the time of the telemed/virtual assessment: family  End time: 10:10am    Total time spent with patient: 10 minutes    The attending portion of this evaluation, treatment, and documentation was performed per Wing Watts MD via Telemedicine AudioVisual using the secure [employer] software platform with 2 way audio/video. The provider was located off-site and the patient is located in the hospital. The aforementioned video software was utilized to document the relevant history and physical exam.    Interval History:   - today, she is doing well. Her abdominal pain is improving. She denies shortness of breath, chest pain, nausea, vomiting, diarrhea, constipation.  - she has questions about if she needs to take a statin, as well if she can have an at-home INR monitor.        Oncology Treatment Plan:   [No matching plan found]    Medications:  Continuous Infusions:  Scheduled Meds:   aspirin  81 mg Oral Daily    atorvastatin  40 mg Oral Daily    enoxparin  1 mg/kg Subcutaneous Q12H (treatment, non-standard time)    warfarin  7.5 mg Oral Daily     PRN Meds:acetaminophen, dextrose 10%, dextrose 10%, glucagon (human recombinant), glucose, glucose, melatonin, naloxone, ondansetron, senna-docusate 8.6-50 mg, sodium chloride 0.9%     Review of Systems   Constitutional:  Negative for fatigue.   HENT:  Negative for sore throat.    Eyes:  Negative for visual disturbance.   Respiratory:  Negative for cough and shortness of breath.    Cardiovascular:  Negative for chest pain.   Gastrointestinal:  Positive for abdominal pain. Negative for constipation, diarrhea, nausea and vomiting.   Genitourinary:  Negative for dysuria.   Musculoskeletal:  Positive for back pain.   Skin:  Negative for rash.   Neurological:  Negative for headaches.   Hematological:  Negative for  adenopathy.   Psychiatric/Behavioral:  The patient is not nervous/anxious.      Objective:     Vital Signs (Most Recent):  Temp: 97.8 °F (36.6 °C) (01/16/24 0735)  Pulse: 71 (01/16/24 0735)  Resp: 20 (01/16/24 0735)  BP: 113/68 (01/16/24 0735)  SpO2: (!) 94 % (01/16/24 0735) Vital Signs (24h Range):  Temp:  [96.8 °F (36 °C)-98.3 °F (36.8 °C)] 97.8 °F (36.6 °C)  Pulse:  [65-76] 71  Resp:  [16-20] 20  SpO2:  [94 %-98 %] 94 %  BP: (102-123)/(59-78) 113/68     Weight: 65 kg (143 lb 6.4 oz)  Body mass index is 24.61 kg/m².  Body surface area is 1.71 meters squared.      Intake/Output Summary (Last 24 hours) at 1/16/2024 1000  Last data filed at 1/16/2024 0320  Gross per 24 hour   Intake 480 ml   Output --   Net 480 ml        Physical Exam  Deferred due to telemedicine visit.       Significant Labs:   CBC:   Recent Labs   Lab 01/14/24  1256 01/15/24  0455 01/16/24  0532   WBC 7.41 6.62 6.82   HGB 12.1 12.0 12.4   HCT 35.5* 34.9* 36.4*    340 360   , CMP:   Recent Labs   Lab 01/15/24  0455 01/16/24  0532    140   K 4.4 4.6    107   CO2 22* 23   GLU 97 98   BUN 13 12   CREATININE 0.7 0.7   CALCIUM 10.1 10.3   PROT 7.0 7.2   ALBUMIN 3.6 3.7   BILITOT 0.2 0.2   ALKPHOS 52* 51*   AST 37 41*   ALT 39 51*   ANIONGAP 10 10   , and Coagulation:   Recent Labs   Lab 01/15/24  0455 01/16/24  0532   INR 1.9* 2.0*

## 2024-01-16 NOTE — PLAN OF CARE
Problem: Adult Inpatient Plan of Care  Goal: Plan of Care Review  Outcome: Ongoing, Progressing  Goal: Patient-Specific Goal (Individualized)  Outcome: Ongoing, Progressing  Goal: Absence of Hospital-Acquired Illness or Injury  Outcome: Ongoing, Progressing  Goal: Optimal Comfort and Wellbeing  Outcome: Ongoing, Progressing  Goal: Readiness for Transition of Care  Outcome: Ongoing, Progressing     Problem: Pain Acute  Goal: Acceptable Pain Control and Functional Ability  Outcome: Ongoing, Progressing     Problem: Fall Injury Risk  Goal: Absence of Fall and Fall-Related Injury  Outcome: Ongoing, Progressing     Problem: VTE (Venous Thromboembolism)  Goal: VTE (Venous Thromboembolism) Symptom Resolution  Outcome: Ongoing, Progressing

## 2024-01-16 NOTE — PROGRESS NOTES
Future Appointments   Date Time Provider Department Center   1/24/2024  3:00 PM Angel Mckeon, AARON [facility]   2/7/2024  8:20 AM Wing Watts MD [facility] HEM ON[facility]   2/14/2024  3:30 PM Leroy Mancilla MD [facility]   4/8/2024  8:30 AM Yazmin Hoffman MD [facility]   5/13/2024  1:00 PM Inga Schmitz MD [facility] to assist with [facility] f/u

## 2024-01-16 NOTE — PROGRESS NOTES
[facility] Medicine  Progress Note    Patient Name: Rhonda Ahuja  MRN: [id number]  Patient Class: IP- Inpatient   Admission Date: 1/10/2024  Length of Stay: 4 days  Attending Physician: Derrick Moran MD  Primary Care Provider: Rm Simpson MD        Subjective:     Principal Problem:Splenic infarct        HPI:  Patient is a 44 y.o.-year-old female w/ PMHx CVA in September 23 of this year, currently seeing heme/onc (Dr. Watts) for workup for APLS who presents to the ED with reports of abdominal pain since Sarbjit night. The pain started with a left upper quadrant abdominal pain which started radiating to left lower quadrant and now left flank pain. She denies any pain associated with meals, she denies any change in diet. She denies fevers, chills, headache, chest pain, shortness of breath. She denies constipation, diarrhea, hematuria, bloody stools. She has a history of 2 C-sections and denies any abnormal bleeding or clotting that she's noticed. She denies any abnormalities in her menstrual cycle. She denies any family history of blood disorders or clotting disorders.      In the ED, initial vitals Temp 97.9F, /70, HR 86, RR 18, SpO2 98% on room air. CBC was WNL, CMP significant for hyponatremia of 130, CO2 of 18 Estimated Creatinine Clearance: 95.7 mL/min (based on SCr of 0.7 mg/dL).  CTA abdomen and pelvis showed abnormal enhancement of splenic region, concerning for splenic infarct. Trace fluid in subcapsular region of spleen and patent splenic artery and vein. EKG showed accelerated junctional rhythm, a prolonged QTc of 541.   In the ED patient was treated with morphine, zofran, 1L NS bolus. [facility] admitted patient for splenic infarct/APLS workup    Overview/Hospital Course:  HPI as above. Patient was admitted and evaluated to have a splenic infarct. She was started on therapeutic lovenox and started on a pain regimen. Due to her APLS workup and early CVA this year  in September of 2023, consulted Hematology/oncology and patient had been seeing Dr. Hernandez. Dr. Hernandez saw patient and recommended continuing anticoagulation with DOAC and bridging to warfarin with close follow up with coumadin clinic on discharge. Pharmacy closely following for warfarin titration. Discussed with pharmacy and patient was started on warfarin 7.5 which raised her INR to 1.7 and warfarin dose decreased to 5. Pharmacy recommended at least 5 days of therapeutic lovenox (until 1/15) AND 2 days of therapeutic INR levels before discharging. Patient had increasing pain for the first two days and was controlled on pain regimen, and on day of handoff her pain was mostly resolved.     Interval History: INR 2.0. Dr. Watts made aware of updated APLS labs - final recs pending.    Review of Systems   Constitutional:  Negative for chills and fever.   HENT:  Negative for trouble swallowing.    Respiratory:  Negative for shortness of breath.    Cardiovascular:  Negative for chest pain and palpitations.   Gastrointestinal:  Negative for abdominal pain, nausea and vomiting.   Genitourinary:  Negative for flank pain and hematuria.   Musculoskeletal:  Negative for gait problem.   Neurological:  Negative for weakness and headaches.     Objective:     Vital Signs (Most Recent):  Temp: 97.8 °F (36.6 °C) (01/16/24 0735)  Pulse: 71 (01/16/24 0735)  Resp: 20 (01/16/24 0735)  BP: 113/68 (01/16/24 0735)  SpO2: (!) 94 % (01/16/24 0735) Vital Signs (24h Range):  Temp:  [96.8 °F (36 °C)-98.3 °F (36.8 °C)] 97.8 °F (36.6 °C)  Pulse:  [65-76] 71  Resp:  [16-20] 20  SpO2:  [94 %-98 %] 94 %  BP: (102-123)/(59-78) 113/68     Weight: 65 kg (143 lb 6.4 oz)  Body mass index is 24.61 kg/m².    Intake/Output Summary (Last 24 hours) at 1/16/2024 0903  Last data filed at 1/16/2024 0320  Gross per 24 hour   Intake 480 ml   Output --   Net 480 ml         Physical Exam  Constitutional:       General: She is not in acute distress.      Appearance: Normal appearance.   Eyes:      General:         Right eye: No discharge.         Left eye: No discharge.      Conjunctiva/sclera: Conjunctivae normal.   Cardiovascular:      Rate and Rhythm: Normal rate.   Pulmonary:      Effort: Pulmonary effort is normal.   Abdominal:      Palpations: Abdomen is soft.      Tenderness: There is no abdominal tenderness. There is no guarding or rebound.   Skin:     Coloration: Skin is not jaundiced or pale.   Neurological:      Mental Status: She is alert and oriented to person, place, and time.   Psychiatric:         Mood and Affect: Mood normal.         Behavior: Behavior normal.             Significant Labs: All pertinent labs within the past 24 hours have been reviewed.  CBC:   Recent Labs   Lab 01/14/24  1256 01/15/24  0455 01/16/24  0532   WBC 7.41 6.62 6.82   HGB 12.1 12.0 12.4   HCT 35.5* 34.9* 36.4*    340 360     CMP:   Recent Labs   Lab 01/15/24  0455 01/16/24  0532    140   K 4.4 4.6    107   CO2 22* 23   GLU 97 98   BUN 13 12   CREATININE 0.7 0.7   CALCIUM 10.1 10.3   PROT 7.0 7.2   ALBUMIN 3.6 3.7   BILITOT 0.2 0.2   ALKPHOS 52* 51*   AST 37 41*   ALT 39 51*   ANIONGAP 10 10       Significant Imaging: I have reviewed all pertinent imaging results/findings within the past 24 hours.    Assessment/Plan:      * Splenic infarct  Patient with history of LUQ and LLQ pain radiating to left flank. Currently being worked up by heme/onc for APLS. CTA abdomen pelvis with evidence of splenic infarct.    Plan:  ASA  Consult heme/onc - believes it is likely APLS due to previous beta 2 glycoprotein and clinical history  Therapeutic lovenox until can bridge to warfarin   INR 2.9 on 1/16. Plan to keep patient on Lovenox one more day to complete 2 days of INR within 2-3.  Patient instructed to avoid NSAIDs; Aspirin switched from chewable to enteric-coated  Pain improved, required no pain medication.  Zofran PRN for nausea  Beta 2 glycoprotein: Beta-2 Glyo 1  "IgM: Elevated - 53, IgG & IgA WNL  DRVVT Normal  Cardiolipin antibody WNL  F/u CMV/EBV lab due to splenic infarct        Antiphospholipid syndrome  See "splenic infarct"   Consulted Hematology/oncology    Left sided abdominal pain  See "splenic infarct"    HLD (hyperlipidemia)  Patient refusing Atorvastatin because she wishes to reduce cholesterol with lifestyle modifications & wishes to see coronary artery calcium score before making a decision to continue medication. 1/15/24 - Patient was informed that statins are recommended for long term prevention, especially with someone with hypercoagulability & history of CVA. Patient expressed understanding.    Lab Results   Component Value Date    CHOL 222 (H) 12/18/2023    CHOL 277 (H) 09/23/2023     Lab Results   Component Value Date    HDL 54 12/18/2023    HDL 55 09/23/2023     Lab Results   Component Value Date    LDLCALC 155.6 12/18/2023    LDLCALC 200.0 (H) 09/23/2023     Lab Results   Component Value Date    TRIG 62 12/18/2023    TRIG 110 09/23/2023       Lab Results   Component Value Date    CHOLHDL 24.3 12/18/2023    CHOLHDL 19.9 (L) 09/23/2023     Patient with history of hyperlipemia and history of stroke    Plan:  Atorvastatin 40      History of cerebrovascular accident (CVA) due to embolism  Patient with history of CVA in September, no current headache or neurological symptoms. Being worked up for APLS    Plan:  ASA  Patient declined statin - see "HLD"        VTE Risk Mitigation (From admission, onward)           Ordered     warfarin tablet 7.5 mg  Daily         01/15/24 1310     enoxaparin injection 70 mg  Every 12 hours         01/11/24 0353     Reason for No Pharmacological VTE Prophylaxis  Once        Question:  Reasons:  Answer:  Physician Provided (leave comment)  Comment:  will do anticoagulation    01/10/24 3604     IP VTE HIGH RISK PATIENT  Once         01/10/24 8629     Place sequential compression device  Until discontinued         01/10/24 6253      "               Discharge Planning   THA:      Code Status: Full Code   Is the patient medically ready for discharge?:     Reason for patient still in hospital (select all that apply): Patient trending condition  Discharge Plan A: Home, Home with family                  Ava Hill MD  Department of Hospital Medicine   [facility]

## 2024-01-16 NOTE — SUBJECTIVE & OBJECTIVE
Interval History: INR 2.0. Dr. Watts made aware of updated APLS labs - final recs pending.    Review of Systems   Constitutional:  Negative for chills and fever.   HENT:  Negative for trouble swallowing.    Respiratory:  Negative for shortness of breath.    Cardiovascular:  Negative for chest pain and palpitations.   Gastrointestinal:  Negative for abdominal pain, nausea and vomiting.   Genitourinary:  Negative for flank pain and hematuria.   Musculoskeletal:  Negative for gait problem.   Neurological:  Negative for weakness and headaches.     Objective:     Vital Signs (Most Recent):  Temp: 97.8 °F (36.6 °C) (01/16/24 0735)  Pulse: 71 (01/16/24 0735)  Resp: 20 (01/16/24 0735)  BP: 113/68 (01/16/24 0735)  SpO2: (!) 94 % (01/16/24 0735) Vital Signs (24h Range):  Temp:  [96.8 °F (36 °C)-98.3 °F (36.8 °C)] 97.8 °F (36.6 °C)  Pulse:  [65-76] 71  Resp:  [16-20] 20  SpO2:  [94 %-98 %] 94 %  BP: (102-123)/(59-78) 113/68     Weight: 65 kg (143 lb 6.4 oz)  Body mass index is 24.61 kg/m².    Intake/Output Summary (Last 24 hours) at 1/16/2024 0956  Last data filed at 1/16/2024 0320  Gross per 24 hour   Intake 480 ml   Output --   Net 480 ml         Physical Exam  Constitutional:       General: She is not in acute distress.     Appearance: Normal appearance.   Eyes:      General:         Right eye: No discharge.         Left eye: No discharge.      Conjunctiva/sclera: Conjunctivae normal.   Cardiovascular:      Rate and Rhythm: Normal rate.   Pulmonary:      Effort: Pulmonary effort is normal.   Abdominal:      Palpations: Abdomen is soft.      Tenderness: There is no abdominal tenderness. There is no guarding or rebound.   Skin:     Coloration: Skin is not jaundiced or pale.   Neurological:      Mental Status: She is alert and oriented to person, place, and time.   Psychiatric:         Mood and Affect: Mood normal.         Behavior: Behavior normal.             Significant Labs: All pertinent labs within the past 24 hours  have been reviewed.  CBC:   Recent Labs   Lab 01/14/24  1256 01/15/24  0455 01/16/24  0532   WBC 7.41 6.62 6.82   HGB 12.1 12.0 12.4   HCT 35.5* 34.9* 36.4*    340 360     CMP:   Recent Labs   Lab 01/15/24  0455 01/16/24  0532    140   K 4.4 4.6    107   CO2 22* 23   GLU 97 98   BUN 13 12   CREATININE 0.7 0.7   CALCIUM 10.1 10.3   PROT 7.0 7.2   ALBUMIN 3.6 3.7   BILITOT 0.2 0.2   ALKPHOS 52* 51*   AST 37 41*   ALT 39 51*   ANIONGAP 10 10       Significant Imaging: I have reviewed all pertinent imaging results/findings within the past 24 hours.

## 2024-01-16 NOTE — ASSESSMENT & PLAN NOTE
- beta-2 glycoprotein antibody came back positive, suggesting the diagnosis of antiphospholipid syndrome  - recommend she see rheumatology following discharge for an evaluation as well.

## 2024-01-16 NOTE — PLAN OF CARE
Problem: Adult Inpatient Plan of Care  Goal: Plan of Care Review  Outcome: Ongoing, Progressing  Flowsheets (Taken 1/16/2024 0342)  Plan of Care Reviewed With: patient     Problem: Pain Acute  Goal: Acceptable Pain Control and Functional Ability  Outcome: Ongoing, Progressing  Intervention: Prevent or Manage Pain  Flowsheets (Taken 1/16/2024 0342)  Medication Review/Management: medications reviewed     Problem: VTE (Venous Thromboembolism)  Goal: VTE (Venous Thromboembolism) Symptom Resolution  Outcome: Ongoing, Progressing  Intervention: Prevent or Manage VTE (Venous Thromboembolism)  Flowsheets (Taken 1/16/2024 0342)  VTE Prevention/Management:   bleeding precations maintained   bleeding risk factor(s) identified, provider notified      Call initiated to the patient.  Patient informed that the prescription for the percocet was sent to the pharmacy.

## 2024-01-16 NOTE — ASSESSMENT & PLAN NOTE
- CTA abdomen (1/10/24) revealed a splenic infarct  - given her history of miscarriage, stroke, and positive beta-2 glycoprotein antibody (don't have confirmatory lab testing yet though), I feel comfortable stating she has a hypercoagulable state, most likely antiphospholipid syndrome.  - currently receiving enoxaparin and warfarin. INR today is 2. Can likely continue enoxaparin for 1-2 more days then discontinue it, continuing warfarin alone.  - Recommend referral (outpatient) to coumadin monitoring clinic at time of discharge. Defer decisions about at-home INR monitor to coumadin clinic.  - follow-up appointment with me is scheduled on 2/7/24.

## 2024-01-16 NOTE — PROGRESS NOTES
[place] [facility]  Hematology/Oncology  Progress Note    Patient Name: Rhonda Ahuja  Admission Date: 1/10/2024  Hospital Length of Stay: 4 days  Code Status: Full Code     Subjective:     HPI:  44 year-old female with history of stroke was admitted on 1/10/24 for splenic infarct. Consult is for possible antiphospholipid syndrome.    VIRTUAL TELENOTE    Start time:10:00am  Chief complaint: splenic infarct  The patient location is: [id number]  The patient arrived at: 10:00am  Present with the patient at the time of the telemed/virtual assessment: family  End time: 10:10am    Total time spent with patient: 10 minutes    The attending portion of this evaluation, treatment, and documentation was performed per Wing Watts MD via Telemedicine AudioVisual using the secure [employer] software platform with 2 way audio/video. The provider was located off-site and the patient is located in the hospital. The aforementioned video software was utilized to document the relevant history and physical exam.    Interval History:   - today, she is doing well. Her abdominal pain is improving. She denies shortness of breath, chest pain, nausea, vomiting, diarrhea, constipation.  - she has questions about if she needs to take a statin, as well if she can have an at-home INR monitor.        Oncology Treatment Plan:   [No matching plan found]    Medications:  Continuous Infusions:  Scheduled Meds:   aspirin  81 mg Oral Daily    atorvastatin  40 mg Oral Daily    enoxparin  1 mg/kg Subcutaneous Q12H (treatment, non-standard time)    warfarin  7.5 mg Oral Daily     PRN Meds:acetaminophen, dextrose 10%, dextrose 10%, glucagon (human recombinant), glucose, glucose, melatonin, naloxone, ondansetron, senna-docusate 8.6-50 mg, sodium chloride 0.9%     Review of Systems   Constitutional:  Negative for fatigue.   HENT:  Negative for sore throat.    Eyes:  Negative for visual disturbance.   Respiratory:  Negative for cough and shortness of breath.     Cardiovascular:  Negative for chest pain.   Gastrointestinal:  Positive for abdominal pain. Negative for constipation, diarrhea, nausea and vomiting.   Genitourinary:  Negative for dysuria.   Musculoskeletal:  Positive for back pain.   Skin:  Negative for rash.   Neurological:  Negative for headaches.   Hematological:  Negative for adenopathy.   Psychiatric/Behavioral:  The patient is not nervous/anxious.      Objective:     Vital Signs (Most Recent):  Temp: 97.8 °F (36.6 °C) (01/16/24 0735)  Pulse: 71 (01/16/24 0735)  Resp: 20 (01/16/24 0735)  BP: 113/68 (01/16/24 0735)  SpO2: (!) 94 % (01/16/24 0735) Vital Signs (24h Range):  Temp:  [96.8 °F (36 °C)-98.3 °F (36.8 °C)] 97.8 °F (36.6 °C)  Pulse:  [65-76] 71  Resp:  [16-20] 20  SpO2:  [94 %-98 %] 94 %  BP: (102-123)/(59-78) 113/68     Weight: 65 kg (143 lb 6.4 oz)  Body mass index is 24.61 kg/m².  Body surface area is 1.71 meters squared.      Intake/Output Summary (Last 24 hours) at 1/16/2024 1000  Last data filed at 1/16/2024 0320  Gross per 24 hour   Intake 480 ml   Output --   Net 480 ml        Physical Exam  Deferred due to telemedicine visit.       Significant Labs:   CBC:   Recent Labs   Lab 01/14/24  1256 01/15/24  0455 01/16/24  0532   WBC 7.41 6.62 6.82   HGB 12.1 12.0 12.4   HCT 35.5* 34.9* 36.4*    340 360   , CMP:   Recent Labs   Lab 01/15/24  0455 01/16/24  0532    140   K 4.4 4.6    107   CO2 22* 23   GLU 97 98   BUN 13 12   CREATININE 0.7 0.7   CALCIUM 10.1 10.3   PROT 7.0 7.2   ALBUMIN 3.6 3.7   BILITOT 0.2 0.2   ALKPHOS 52* 51*   AST 37 41*   ALT 39 51*   ANIONGAP 10 10   , and Coagulation:   Recent Labs   Lab 01/15/24  0455 01/16/24  0532   INR 1.9* 2.0*       Assessment/Plan:     * Splenic infarct  - CTA abdomen (1/10/24) revealed a splenic infarct  - given her history of miscarriage, stroke, and positive beta-2 glycoprotein antibody (don't have confirmatory lab testing yet though), I feel comfortable stating she has a  hypercoagulable state, most likely antiphospholipid syndrome.  - currently receiving enoxaparin and warfarin. INR today is 2. Can likely continue enoxaparin for 1-2 more days then discontinue it, continuing warfarin alone.  - Recommend referral (outpatient) to coumadin monitoring clinic at time of discharge. Defer decisions about at-home INR monitor to coumadin clinic.  - follow-up appointment with me is scheduled on 2/7/24.    Antiphospholipid syndrome  - see above.    History of cerebrovascular accident (CVA) due to embolism  - see above         Wing Watts MD  Hematology/Oncology  [place] - [facility] S[facility]

## 2024-01-16 NOTE — ASSESSMENT & PLAN NOTE
Patient with history of LUQ and LLQ pain radiating to left flank. Currently being worked up by heme/onc for APLS. CTA abdomen pelvis with evidence of splenic infarct.    Plan:  ASA  Consult heme/onc - believes it is likely APLS due to previous beta 2 glycoprotein and clinical history  Therapeutic lovenox until can bridge to warfarin   INR 2.9 on 1/16. Plan to keep patient on Lovenox one more day to complete 2 days of INR within 2-3.  Patient instructed to avoid NSAIDs; Aspirin switched from chewable to enteric-coated  Pain improved, required no pain medication.  Zofran PRN for nausea  Beta 2 glycoprotein: Beta-2 Glyo 1 IgM: Elevated - 53, IgG & IgA WNL  DRVVT Normal  Cardiolipin antibody WNL  F/u CMV/EBV lab due to splenic infarct

## 2024-01-17 ENCOUNTER — ANTI-COAG VISIT (OUTPATIENT)
Dept: CARDIOLOGY | Facility: CLINIC | Age: 44
End: 2024-01-17
Payer: MEDICAID

## 2024-01-17 ENCOUNTER — PATIENT MESSAGE (OUTPATIENT)
Dept: FAMILY MEDICINE | Facility: HOSPITAL | Age: 44
End: 2024-01-17
Payer: MEDICAID

## 2024-01-17 ENCOUNTER — PATIENT MESSAGE (OUTPATIENT)
Dept: CARDIOLOGY | Facility: CLINIC | Age: 44
End: 2024-01-17
Payer: MEDICAID

## 2024-01-17 VITALS
RESPIRATION RATE: 20 BRPM | WEIGHT: 144 LBS | BODY MASS INDEX: 24.59 KG/M2 | TEMPERATURE: 98 F | OXYGEN SATURATION: 99 % | HEIGHT: 64 IN | HEART RATE: 77 BPM | SYSTOLIC BLOOD PRESSURE: 111 MMHG | DIASTOLIC BLOOD PRESSURE: 63 MMHG

## 2024-01-17 DIAGNOSIS — Z12.31 ENCOUNTER FOR SCREENING MAMMOGRAM FOR MALIGNANT NEOPLASM OF BREAST: Primary | ICD-10-CM

## 2024-01-17 DIAGNOSIS — D68.61 ANTIPHOSPHOLIPID SYNDROME: ICD-10-CM

## 2024-01-17 DIAGNOSIS — Z79.01 LONG TERM (CURRENT) USE OF ANTICOAGULANTS: Primary | ICD-10-CM

## 2024-01-17 DIAGNOSIS — D73.5 SPLENIC INFARCT: ICD-10-CM

## 2024-01-17 LAB
ALBUMIN SERPL BCP-MCNC: 3.6 G/DL (ref 3.5–5.2)
ALP SERPL-CCNC: 50 U/L (ref 55–135)
ALT SERPL W/O P-5'-P-CCNC: 54 U/L (ref 10–44)
ANION GAP SERPL CALC-SCNC: 9 MMOL/L (ref 8–16)
AST SERPL-CCNC: 32 U/L (ref 10–40)
BASOPHILS # BLD AUTO: 0.09 K/UL (ref 0–0.2)
BASOPHILS NFR BLD: 1.4 % (ref 0–1.9)
BILIRUB SERPL-MCNC: 0.1 MG/DL (ref 0.1–1)
BUN SERPL-MCNC: 13 MG/DL (ref 6–20)
CALCIUM SERPL-MCNC: 9.9 MG/DL (ref 8.7–10.5)
CHLORIDE SERPL-SCNC: 107 MMOL/L (ref 95–110)
CO2 SERPL-SCNC: 22 MMOL/L (ref 23–29)
CREAT SERPL-MCNC: 0.7 MG/DL (ref 0.5–1.4)
DIFFERENTIAL METHOD BLD: ABNORMAL
EOSINOPHIL # BLD AUTO: 0.2 K/UL (ref 0–0.5)
EOSINOPHIL NFR BLD: 3.4 % (ref 0–8)
ERYTHROCYTE [DISTWIDTH] IN BLOOD BY AUTOMATED COUNT: 11.9 % (ref 11.5–14.5)
EST. GFR  (NO RACE VARIABLE): >60 ML/MIN/1.73 M^2
GLUCOSE SERPL-MCNC: 96 MG/DL (ref 70–110)
HCT VFR BLD AUTO: 35.9 % (ref 37–48.5)
HGB BLD-MCNC: 12.5 G/DL (ref 12–16)
IMM GRANULOCYTES # BLD AUTO: 0.02 K/UL (ref 0–0.04)
IMM GRANULOCYTES NFR BLD AUTO: 0.3 % (ref 0–0.5)
INR PPP: 2.6 (ref 0.8–1.2)
LYMPHOCYTES # BLD AUTO: 1.8 K/UL (ref 1–4.8)
LYMPHOCYTES NFR BLD: 27.9 % (ref 18–48)
MAGNESIUM SERPL-MCNC: 2 MG/DL (ref 1.6–2.6)
MCH RBC QN AUTO: 31.1 PG (ref 27–31)
MCHC RBC AUTO-ENTMCNC: 34.8 G/DL (ref 32–36)
MCV RBC AUTO: 89 FL (ref 82–98)
MONOCYTES # BLD AUTO: 0.6 K/UL (ref 0.3–1)
MONOCYTES NFR BLD: 8.7 % (ref 4–15)
NEUTROPHILS # BLD AUTO: 3.8 K/UL (ref 1.8–7.7)
NEUTROPHILS NFR BLD: 58.3 % (ref 38–73)
NRBC BLD-RTO: 0 /100 WBC
PHOSPHATE SERPL-MCNC: 3.7 MG/DL (ref 2.7–4.5)
PLATELET # BLD AUTO: 353 K/UL (ref 150–450)
PMV BLD AUTO: 9 FL (ref 9.2–12.9)
POTASSIUM SERPL-SCNC: 4.4 MMOL/L (ref 3.5–5.1)
PROT SERPL-MCNC: 7 G/DL (ref 6–8.4)
PROTHROMBIN TIME: 26.6 SEC (ref 9–12.5)
RBC # BLD AUTO: 4.02 M/UL (ref 4–5.4)
SODIUM SERPL-SCNC: 138 MMOL/L (ref 136–145)
WBC # BLD AUTO: 6.56 K/UL (ref 3.9–12.7)

## 2024-01-17 PROCEDURE — 83735 ASSAY OF MAGNESIUM: CPT

## 2024-01-17 PROCEDURE — 63600175 PHARM REV CODE 636 W HCPCS: Performed by: STUDENT IN AN ORGANIZED HEALTH CARE EDUCATION/TRAINING PROGRAM

## 2024-01-17 PROCEDURE — 85025 COMPLETE CBC W/AUTO DIFF WBC: CPT

## 2024-01-17 PROCEDURE — 25000003 PHARM REV CODE 250

## 2024-01-17 PROCEDURE — 80053 COMPREHEN METABOLIC PANEL: CPT

## 2024-01-17 PROCEDURE — 84100 ASSAY OF PHOSPHORUS: CPT

## 2024-01-17 PROCEDURE — 85610 PROTHROMBIN TIME: CPT

## 2024-01-17 RX ORDER — WARFARIN SODIUM 5 MG/1
5 TABLET ORAL DAILY
Status: DISCONTINUED | OUTPATIENT
Start: 2024-01-17 | End: 2024-01-17 | Stop reason: HOSPADM

## 2024-01-17 RX ORDER — ATORVASTATIN CALCIUM 40 MG/1
40 TABLET, FILM COATED ORAL DAILY
Qty: 90 TABLET | Refills: 3 | Status: SHIPPED | OUTPATIENT
Start: 2024-01-17 | End: 2024-03-27

## 2024-01-17 RX ORDER — WARFARIN SODIUM 5 MG/1
5 TABLET ORAL DAILY
Qty: 30 TABLET | Refills: 11 | Status: SHIPPED | OUTPATIENT
Start: 2024-01-17 | End: 2024-03-21

## 2024-01-17 RX ORDER — ASPIRIN 81 MG/1
81 TABLET ORAL DAILY
Qty: 30 TABLET | Refills: 11 | Status: SHIPPED | OUTPATIENT
Start: 2024-01-17 | End: 2025-01-16

## 2024-01-17 RX ADMIN — ASPIRIN 81 MG: 81 TABLET, COATED ORAL at 08:01

## 2024-01-17 RX ADMIN — ENOXAPARIN SODIUM 70 MG: 100 INJECTION SUBCUTANEOUS at 05:01

## 2024-01-17 NOTE — SUBJECTIVE & OBJECTIVE
Interval History: INR 2.6, patient within therapeutic range for last 2 days.     Review of Systems   Constitutional:  Negative for chills and fever.   Eyes:  Negative for visual disturbance.   Respiratory:  Negative for shortness of breath.    Cardiovascular:  Negative for chest pain and palpitations.   Gastrointestinal:  Negative for abdominal pain.   Genitourinary:  Negative for dysuria and hematuria.   Musculoskeletal:  Negative for gait problem.   Neurological:  Negative for weakness and headaches.   Psychiatric/Behavioral:  Negative for confusion and sleep disturbance.      Objective:     Vital Signs (Most Recent):  Temp: 98 °F (36.7 °C) (01/17/24 0738)  Pulse: 77 (01/17/24 0738)  Resp: 20 (01/17/24 0738)  BP: 111/63 (01/17/24 0738)  SpO2: 99 % (01/17/24 0738) Vital Signs (24h Range):  Temp:  [97.8 °F (36.6 °C)-98.3 °F (36.8 °C)] 98 °F (36.7 °C)  Pulse:  [65-80] 77  Resp:  [18-20] 20  SpO2:  [97 %-99 %] 99 %  BP: ()/(53-71) 111/63     Weight: 65.3 kg (144 lb)  Body mass index is 24.72 kg/m².    Intake/Output Summary (Last 24 hours) at 1/17/2024 1035  Last data filed at 1/16/2024 2106  Gross per 24 hour   Intake 600 ml   Output --   Net 600 ml         Physical Exam  Constitutional:       General: She is not in acute distress.  Eyes:      General:         Right eye: No discharge.         Left eye: No discharge.      Conjunctiva/sclera: Conjunctivae normal.   Cardiovascular:      Rate and Rhythm: Normal rate and regular rhythm.      Pulses: Normal pulses.      Heart sounds: Normal heart sounds. No murmur heard.  Pulmonary:      Effort: Pulmonary effort is normal.      Breath sounds: Normal breath sounds. No wheezing.   Abdominal:      General: Bowel sounds are normal.      Tenderness: There is no abdominal tenderness. There is no guarding or rebound.   Musculoskeletal:      Right lower leg: No edema.      Left lower leg: No edema.   Skin:     General: Skin is warm.      Coloration: Skin is not jaundiced.    Neurological:      Mental Status: She is alert and oriented to person, place, and time.      Motor: No weakness.      Gait: Gait normal.   Psychiatric:         Mood and Affect: Mood normal.         Behavior: Behavior normal.             Significant Labs: All pertinent labs within the past 24 hours have been reviewed.  CBC:   Recent Labs   Lab 01/16/24  0532 01/17/24  0508   WBC 6.82 6.56   HGB 12.4 12.5   HCT 36.4* 35.9*    353     CMP:   Recent Labs   Lab 01/16/24  0532 01/17/24  0508    138   K 4.6 4.4    107   CO2 23 22*   GLU 98 96   BUN 12 13   CREATININE 0.7 0.7   CALCIUM 10.3 9.9   PROT 7.2 7.0   ALBUMIN 3.7 3.6   BILITOT 0.2 0.1   ALKPHOS 51* 50*   AST 41* 32   ALT 51* 54*   ANIONGAP 10 9       Significant Imaging: I have reviewed all pertinent imaging results/findings within the past 24 hours.

## 2024-01-17 NOTE — PROGRESS NOTES
Future Appointments   Date Time Provider Department Center   1/24/2024  3:00 PM Angel Mckeon, AARON [facility]   2/7/2024  8:20 AM Wing Watts MD [facility] HEM ONC [facility]   2/14/2024  3:30 PM Leroy Mancilla MD [facility]   4/8/2024  8:30 AM Yazmin Hoffman MD [facility] OBN [place]   5/13/2024  1:00 PM Inga Schmitz MD [facility]

## 2024-01-17 NOTE — DISCHARGE SUMMARY
[facility] Medicine  Discharge Summary      Patient Name: Rhonda Ahuja  MRN: [id number]  RICK: [id number]  Patient Class: IP- Inpatient  Admission Date: 1/10/2024  Hospital Length of Stay: 5 days  Discharge Date and Time:  01/17/2024 10:49 AM  Attending Physician: Derrick Moran MD   Discharging Provider: Ava Hill MD  Primary Care Provider: Rm Simpson MD    Primary Care Team: [facility]  HOSPITALIST TEAM    HPI:   Patient is a 44 y.o.-year-old female w/ PMHx CVA in September 23 of this year, currently seeing heme/onc (Dr. Watts) for workup for APLS who presents to the ED with reports of abdominal pain since Sarbjit night. The pain started with a left upper quadrant abdominal pain which started radiating to left lower quadrant and now left flank pain. She denies any pain associated with meals, she denies any change in diet. She denies fevers, chills, headache, chest pain, shortness of breath. She denies constipation, diarrhea, hematuria, bloody stools. She has a history of 2 C-sections and denies any abnormal bleeding or clotting that she's noticed. She denies any abnormalities in her menstrual cycle. She denies any family history of blood disorders or clotting disorders.      In the ED, initial vitals Temp 97.9F, /70, HR 86, RR 18, SpO2 98% on room air. CBC was WNL, CMP significant for hyponatremia of 130, CO2 of 18 Estimated Creatinine Clearance: 95.7 mL/min (based on SCr of 0.7 mg/dL).  CTA abdomen and pelvis showed abnormal enhancement of splenic region, concerning for splenic infarct. Trace fluid in subcapsular region of spleen and patent splenic artery and vein. EKG showed accelerated junctional rhythm, a prolonged QTc of 541.   In the ED patient was treated with morphine, zofran, 1L NS bolus. [facility] admitted patient for splenic infarct/APLS workup    * No surgery found *      Hospital Course:   HPI as above. Patient was admitted and evaluated to have a splenic  infarct. She was started on therapeutic lovenox and started on a pain regimen. Due to her APLS workup and early CVA this year in September of 2023, consulted Hematology/oncology and patient had been seeing Dr. Hernandez. Dr. Hernandez saw patient and recommended continuing anticoagulation with DOAC and bridging to warfarin with close follow up with coumadin clinic on discharge. Pharmacy closely followed for warfarin titration.  At time of discharge, patient was hemodynamically stable. She was on bridging lovenox for 8 days, with INR being between 2-3 for the last two of those days. Pain was resolved with patient requiring no pain medications the last two days of hospital stay. Patient was discharged on Warfarin 5mg with instructions to follow up with coumadin clinic. She was also given referral to cardiology. Hematology appointment was made for 2/7/24. Patient will need to follow up with PCP for hospital D/C Follow up. Patient agreeable to discharge plan, expressed understanding, all questions answered, return precautions were discussed.          Vital Signs (Most Recent):  Temp: 98 °F (36.7 °C) (01/17/24 0738)  Pulse: 77 (01/17/24 0738)  Resp: 20 (01/17/24 0738)  BP: 111/63 (01/17/24 0738)  SpO2: 99 % (01/17/24 0738) Vital Signs (24h Range):  Temp:  [97.8 °F (36.6 °C)-98.3 °F (36.8 °C)] 98 °F (36.7 °C)  Pulse:  [65-80] 77  Resp:  [18-20] 20  SpO2:  [97 %-99 %] 99 %  BP: ()/(53-71) 111/63      Weight: 65.3 kg (144 lb)  Body mass index is 24.72 kg/m².     Intake/Output Summary (Last 24 hours) at 1/17/2024 1035  Last data filed at 1/16/2024 2106      Gross per 24 hour   Intake 600 ml   Output --   Net 600 ml      Physical Exam  Constitutional:       General: She is not in acute distress.  Eyes:      General:         Right eye: No discharge.         Left eye: No discharge.      Conjunctiva/sclera: Conjunctivae normal.   Cardiovascular:      Rate and Rhythm: Normal rate and regular rhythm.      Pulses: Normal pulses.       Heart sounds: Normal heart sounds. No murmur heard.  Pulmonary:      Effort: Pulmonary effort is normal.      Breath sounds: Normal breath sounds. No wheezing.   Abdominal:      General: Bowel sounds are normal.      Tenderness: There is no abdominal tenderness. There is no guarding or rebound.   Musculoskeletal:      Right lower leg: No edema.      Left lower leg: No edema.   Skin:     General: Skin is warm.      Coloration: Skin is not jaundiced.   Neurological:      Mental Status: She is alert and oriented to person, place, and time.      Motor: No weakness.      Gait: Gait normal.   Psychiatric:         Mood and Affect: Mood normal.         Behavior: Behavior normal.     Goals of Care Treatment Preferences:  Code Status: Full Code          What is most important right now is to focus on avoiding the hospital.  Accordingly, we have decided that the best plan to meet the patient's goals includes continuing with treatment.      Consults:   Consults (From admission, onward)          Status Ordering Provider     Inpatient consult to Hematology/Oncology  Once        Provider:  Wing Watts MD    Completed ITA PONCE            No new Assessment & Plan notes have been filed under this hospital service since the last note was generated.  Service: Hospital Medicine    Final Active Diagnoses:    Diagnosis Date Noted POA    PRINCIPAL PROBLEM:  Splenic infarct [D73.5] 01/10/2024 Yes    Antiphospholipid syndrome [D68.61] 01/11/2024 Yes    Left sided abdominal pain [R10.9] 01/12/2024 Yes    HLD (hyperlipidemia) [E78.5] 09/24/2023 Yes    Prolonged Q-T interval on ECG [R94.31] 01/15/2024 Yes    History of cerebrovascular accident (CVA) due to embolism [Z86.73] 12/18/2023 Not Applicable      Problems Resolved During this Admission:       Discharged Condition: stable    Disposition: Home or Self Care    Follow Up:   Follow-up Information       Wing Watts MD Follow up on 2/7/2024.    Specialty: Hematology and  Oncology  Why: 8:20 am  Contact information:  [address]  [address]  [facility] [address]  [phone]               [facility] Follow up on 1/24/2024.    Specialty: Family Medicine  Why: 3:00 pm -  Contact information:  [address], [address]  [facility] [address] [phone]  Additional information:  Please park in [address] or D and use [address]. Take Medical Office Bldg. elevators.             [facility] Follow up.    Why: YOU WILL BE CONTACTED WITH A FOLLOW UP APT  [facility] WILL NOT BOOK APT UNTIL YOU ARE DISCHARGED                         Patient Instructions:      CT Cardiac Scoring   Standing Status: Future Standing Exp. Date: 01/17/25     Order Specific Question Answer Comments   May the Radiologist modify the order per protocol to meet the clinical needs of the patient? Yes      Ambulatory referral/consult to Anticoagulation Monitoring (PHARMACIST MANAGED)   Standing Status: Future   Referral Priority: Routine Referral Type: Consultation   Referral Reason: Specialty Services Required   Requested Specialty: Cardiology   Number of Visits Requested: 1     Ambulatory referral/consult to Cardiology   Standing Status: Future   Referral Priority: Routine Referral Type: Consultation   Referral Reason: Specialty Services Required   Requested Specialty: Cardiology   Number of Visits Requested: 1     Diet Adult Regular     Notify your health care provider if you experience any of the following:  temperature >100.4     Notify your health care provider if you experience any of the following:  severe uncontrolled pain     Notify your health care provider if you experience any of the following:  persistent nausea and vomiting or diarrhea     Notify your health care provider if you experience any of the following:  redness, tenderness, or signs of infection (pain, swelling, redness, odor or green/yellow discharge around incision site)     Notify your health  care provider if you experience any of the following:  difficulty breathing or increased cough     Notify your health care provider if you experience any of the following:  persistent dizziness, light-headedness, or visual disturbances     Notify your health care provider if you experience any of the following:  increased confusion or weakness     Activity as tolerated       Significant Diagnostic Studies: Labs: CMP   Recent Labs   Lab 01/16/24  0532 01/17/24  0508    138   K 4.6 4.4    107   CO2 23 22*   GLU 98 96   BUN 12 13   CREATININE 0.7 0.7   CALCIUM 10.3 9.9   PROT 7.2 7.0   ALBUMIN 3.7 3.6   BILITOT 0.2 0.1   ALKPHOS 51* 50*   AST 41* 32   ALT 51* 54*   ANIONGAP 10 9    and CBC   Recent Labs   Lab 01/16/24  0532 01/17/24  0508   WBC 6.82 6.56   HGB 12.4 12.5   HCT 36.4* 35.9*    353       Pending Diagnostic Studies:       Procedure Component Value Units Date/Time    CBC with Automated Differential [[id number]]     Order Status: Sent Lab Status: No result     Specimen: Blood            Medications:  Reconciled Home Medications:      Medication List        START taking these medications      aspirin 81 MG EC tablet  Commonly known as: ECOTRIN  Take 1 tablet (81 mg total) by mouth once daily.  Replaces: aspirin 81 MG Chew     atorvastatin 40 MG tablet  Commonly known as: LIPITOR  Take 1 tablet (40 mg total) by mouth once daily.     warfarin 5 MG tablet  Commonly known as: COUMADIN  Take 1 tablet (5 mg total) by mouth Daily.            STOP taking these medications      aspirin 81 MG Chew  Replaced by: aspirin 81 MG EC tablet              Indwelling Lines/Drains at time of discharge:   Lines/Drains/Airways       None                   Time spent on the discharge of patient: >30 minutes         Ava Hill MD  Department of Hospital Medicine  [facility] S[facility]

## 2024-01-17 NOTE — PLAN OF CARE
Problem: Adult Inpatient Plan of Care  Goal: Plan of Care Review  Outcome: Ongoing, Progressing  Flowsheets (Taken 1/17/2024 0611)  Plan of Care Reviewed With: patient     Problem: VTE (Venous Thromboembolism)  Goal: VTE (Venous Thromboembolism) Symptom Resolution  Outcome: Ongoing, Progressing  Intervention: Prevent or Manage VTE (Venous Thromboembolism)  Flowsheets (Taken 1/17/2024 0611)  Bleeding Precautions:   blood pressure closely monitored   coagulation study results reviewed

## 2024-01-17 NOTE — PLAN OF CARE
Pt for d/c to home today   Pt has transportation to home   Discharging nurse to review all d/c meds/instructions   CM confirmed with Yumiko in scheduling - pt will be contacted re:  [facility] f/u        01/17/24 7019   Final Note   Assessment Type Final Discharge Note   Anticipated Discharge Disposition Home   What phone number can be called within the next 1-3 days to see how you are doing after discharge? [phone]   Hospital Resources/Appts/Education Provided Appointments scheduled and added to AVS   Post-Acute Status   Discharge Delays None known at this time

## 2024-01-17 NOTE — PLAN OF CARE
01/17/24 0946   AVS Confirmation   Discharge instructions and AVS given to and reviewed with patient and/or significant other. Yes       AVS printed and handed to patient by bedside nurse. VN reviewed discharge instructions with patient using teachback method.  Allowed time for questions, all questions answered.  Patient verbalized complete understanding of discharge instructions and voices no concerns. Discharge instructions complete. Wheelchair transportation requested.  Bedside nurse notified.

## 2024-01-17 NOTE — PROGRESS NOTES
44 y.o.-year-old female w/ PMHx CVA in September 23 of this year, currently seeing heme/onc (Dr. Watts) for workup for APLS who presents to the ED with reports of abdominal pain since Sunday night. Pt dx w/ splenic infarct. Pt was on asa prior to admit. Started on warfarin with lovenox bridge to therapeutic INR for 2 days prior to d/c today. Pt d/c on warfarin 5mg tablets. Follow calendar for dose and follow up plan. She has appt tomorrow for mammogram and can get INR at same office.     Please contact pt to enroll and educate.

## 2024-01-17 NOTE — PROGRESS NOTES
Patient verified Coumadin as 5 mg tablet. Pt advised to take 5mg this evening. Patient to take Coumadin as prescribed until instructed otherwise and INR due date 1/18/24 at [facility].    Patient education completed regarding vitamin K diet, when to contact Coumadin Clinic, and Coumadin must be taken after 5 pm.  Diet plan is to eat high vitamin K foods daily. Pt takes a multivitamin daily. She does not drink alcohol.

## 2024-01-18 ENCOUNTER — HOSPITAL ENCOUNTER (OUTPATIENT)
Dept: RADIOLOGY | Facility: HOSPITAL | Age: 44
Discharge: HOME | End: 2024-01-18
Attending: STUDENT IN AN ORGANIZED HEALTH CARE EDUCATION/TRAINING PROGRAM
Payer: MEDICAID

## 2024-01-18 ENCOUNTER — ANTI-COAG VISIT (OUTPATIENT)
Dept: CARDIOLOGY | Facility: CLINIC | Age: 44
End: 2024-01-18
Payer: MEDICAID

## 2024-01-18 VITALS — BODY MASS INDEX: 24.59 KG/M2 | HEIGHT: 64 IN | WEIGHT: 144 LBS

## 2024-01-18 DIAGNOSIS — D73.5 SPLENIC INFARCT: Primary | ICD-10-CM

## 2024-01-18 DIAGNOSIS — Z79.01 LONG TERM (CURRENT) USE OF ANTICOAGULANTS: ICD-10-CM

## 2024-01-18 DIAGNOSIS — D68.61 ANTIPHOSPHOLIPID SYNDROME: ICD-10-CM

## 2024-01-18 DIAGNOSIS — Z12.31 ENCOUNTER FOR SCREENING MAMMOGRAM FOR MALIGNANT NEOPLASM OF BREAST: ICD-10-CM

## 2024-01-18 PROCEDURE — 77067 SCR MAMMO BI INCL CAD: CPT | Mod: 26,,, | Performed by: RADIOLOGY

## 2024-01-18 PROCEDURE — 77063 BREAST TOMOSYNTHESIS BI: CPT | Mod: 26,,, | Performed by: RADIOLOGY

## 2024-01-18 PROCEDURE — 93793 ANTICOAG MGMT PT WARFARIN: CPT | Mod: ,,,

## 2024-01-18 PROCEDURE — 77067 SCR MAMMO BI INCL CAD: CPT | Mod: TC

## 2024-01-18 NOTE — PROGRESS NOTES
New pt. D/c from hospital yesterday. INR decreased. Based on dosing trend & INR, increase dose. Im concerned of her plan for greens every day while trying to work out dosing and keep INR in range. Please advise to only have greens every other day for the time being.

## 2024-01-22 ENCOUNTER — LAB VISIT (OUTPATIENT)
Dept: LAB | Facility: HOSPITAL | Age: 44
End: 2024-01-22
Attending: INTERNAL MEDICINE
Payer: MEDICAID

## 2024-01-22 ENCOUNTER — ANTI-COAG VISIT (OUTPATIENT)
Dept: CARDIOLOGY | Facility: CLINIC | Age: 44
End: 2024-01-22
Payer: MEDICAID

## 2024-01-22 DIAGNOSIS — Z79.01 LONG TERM (CURRENT) USE OF ANTICOAGULANTS: ICD-10-CM

## 2024-01-22 DIAGNOSIS — D73.5 SPLENIC INFARCT: ICD-10-CM

## 2024-01-22 DIAGNOSIS — D68.61 ANTIPHOSPHOLIPID SYNDROME: ICD-10-CM

## 2024-01-22 DIAGNOSIS — D73.5 SPLENIC INFARCT: Primary | ICD-10-CM

## 2024-01-22 LAB
INR PPP: 2.3 (ref 0.8–1.2)
PROTHROMBIN TIME: 23.1 SEC (ref 9–12.5)

## 2024-01-22 PROCEDURE — 85610 PROTHROMBIN TIME: CPT | Performed by: INTERNAL MEDICINE

## 2024-01-22 PROCEDURE — 36415 COLL VENOUS BLD VENIPUNCTURE: CPT | Performed by: INTERNAL MEDICINE

## 2024-01-22 PROCEDURE — 93793 ANTICOAG MGMT PT WARFARIN: CPT | Mod: ,,,

## 2024-01-22 NOTE — PROGRESS NOTES
INR good. New pt. Last week, we advised to have greens just every other day instead of daily so we could assess trend. Pt can resume greens daily now. Still need to watch trend and will adjust dose accordingly.

## 2024-01-25 ENCOUNTER — OFFICE VISIT (OUTPATIENT)
Dept: FAMILY MEDICINE | Facility: HOSPITAL | Age: 44
End: 2024-01-25
Payer: MEDICAID

## 2024-01-25 ENCOUNTER — LAB VISIT (OUTPATIENT)
Dept: LAB | Facility: HOSPITAL | Age: 44
End: 2024-01-25
Attending: INTERNAL MEDICINE
Payer: MEDICAID

## 2024-01-25 ENCOUNTER — ANTI-COAG VISIT (OUTPATIENT)
Dept: CARDIOLOGY | Facility: CLINIC | Age: 44
End: 2024-01-25
Payer: MEDICAID

## 2024-01-25 VITALS
HEIGHT: 64 IN | WEIGHT: 141.31 LBS | SYSTOLIC BLOOD PRESSURE: 118 MMHG | DIASTOLIC BLOOD PRESSURE: 77 MMHG | BODY MASS INDEX: 24.13 KG/M2 | HEART RATE: 69 BPM

## 2024-01-25 DIAGNOSIS — D73.5 SPLENIC INFARCT: Primary | ICD-10-CM

## 2024-01-25 DIAGNOSIS — E78.5 HYPERLIPIDEMIA, UNSPECIFIED HYPERLIPIDEMIA TYPE: ICD-10-CM

## 2024-01-25 DIAGNOSIS — D68.61 ANTIPHOSPHOLIPID SYNDROME: ICD-10-CM

## 2024-01-25 DIAGNOSIS — Z79.01 LONG TERM (CURRENT) USE OF ANTICOAGULANTS: ICD-10-CM

## 2024-01-25 DIAGNOSIS — Z86.73 HISTORY OF CEREBROVASCULAR ACCIDENT (CVA) DUE TO EMBOLISM: ICD-10-CM

## 2024-01-25 DIAGNOSIS — D73.5 SPLENIC INFARCT: ICD-10-CM

## 2024-01-25 LAB
INR PPP: 2.3 (ref 0.8–1.2)
PROTHROMBIN TIME: 23.5 SEC (ref 9–12.5)

## 2024-01-25 PROCEDURE — 99213 OFFICE O/P EST LOW 20 MIN: CPT | Performed by: PHYSICIAN ASSISTANT

## 2024-01-25 PROCEDURE — 85610 PROTHROMBIN TIME: CPT | Performed by: INTERNAL MEDICINE

## 2024-01-25 PROCEDURE — 93793 ANTICOAG MGMT PT WARFARIN: CPT | Mod: ,,,

## 2024-01-25 PROCEDURE — 36415 COLL VENOUS BLD VENIPUNCTURE: CPT | Performed by: INTERNAL MEDICINE

## 2024-01-25 NOTE — PROGRESS NOTES
FAMILY MEDICINE  New Visit Progress Note   Recent Hospital Discharge     PRESENTING HISTORY     Chief Complaint/Reason for Admission:  Follow up Hospital Discharge   Chief Complaint   Patient presents with    Hospital Follow Up     PCP: Rm Simpson MD    History of Present Illness:  Ms. Rhonda Ahuja is a 44 y.o. female who was recently admitted to the hospital.    Admission Date: 1/10/2024  Hospital Length of Stay: 5 days  Discharge Date and Time:  01/17/2024 10:49 AM  ___________________________________________________________________    HPI:   Patient is a 44 y.o.-year-old female w/ PMHx CVA in September 23 of this year, currently seeing heme/onc (Dr. Watts) for workup for APLS here today for hospital follow up. Recently presented to the ED with abdominal pain and was found to have splenic infarct. CTA abdomen and pelvis showed abnormal enhancement of splenic region, concerning for splenic infarct. She was started on therapeutic lovenox and a pain regimen. Dr. Watts saw patient and recommended continuing anticoagulation with DOAC and bridging to warfarin. Follow up with coumadin clinic. Was on bridging lovenox with goal INR and discharged on Warfarin 5mg.     Today patient is accompanied by significant other. Overall she is doing well. Still having occasional L sided flank pain, but pain is much improved. She is following closely with Coumadin clinic and is going there after this appointment. She has follow up with Dr. Watts in 2 weeks. Also has follow up scheduled with Cardiology and Rheumatology. She would like to see a rheumatologist sooner than May. Denies any CP, SOB, LE swelling today.     Review of Systems  General ROS: negative for chills, fever or weight loss  Psychological ROS: negative for hallucination, depression or suicidal ideation  Ophthalmic ROS: negative for blurry vision, photophobia or eye pain  ENT ROS: negative for epistaxis, sore throat or rhinorrhea  Respiratory ROS: no  cough, shortness of breath, or wheezing  Cardiovascular ROS: no chest pain or dyspnea on exertion  Gastrointestinal ROS: no abdominal pain, change in bowel habits, or black/ bloody stools  Genito-Urinary ROS: no dysuria, trouble voiding, or hematuria  Musculoskeletal ROS: negative for gait disturbance or muscular weakness  Neurological ROS: no syncope or seizures; no ataxia  Dermatological ROS: negative for pruritis, rash and jaundice    PAST HISTORY:     Past Medical History:   Diagnosis Date    HLD (hyperlipidemia) 2023    Stroke        Past Surgical History:   Procedure Laterality Date    BREAST BIOPSY Right     excisional benign    BUNIONECTOMY       SECTION      x 2    TONSILLECTOMY      TRANSESOPHAGEAL ECHOCARDIOGRAPHY N/A 2023    TRANSESOPHAGEAL ECHOCARDIOGRAPHY N/A 2023    Procedure: ECHOCARDIOGRAM, TRANSESOPHAGEAL;  Surgeon: Wing Yeung MD;  [facility]: [facility] C[facility] LAB;  Service: Cardiology;  Laterality: N/A;       Family History   Problem Relation Age of Onset    Hypertension Mother     Diabetes Mother     ALS Mother     Heart attack Father     Coronary artery disease Father     No Known Problems Sister     No Known Problems Brother     Breast cancer Maternal Grandmother     Colon cancer Neg Hx     Ovarian cancer Neg Hx        Social History     Socioeconomic History    Marital status: Single   Tobacco Use    Smoking status: Never    Smokeless tobacco: Never   Substance and Sexual Activity    Alcohol use: Not Currently    Drug use: Never    Sexual activity: Yes     Partners: Male     Birth control/protection: Coitus interruptus     Social Determinants of Health     Financial Resource Strain: Patient Declined (2024)    Overall Financial Resource Strain (CARDIA)     Difficulty of Paying Living Expenses: Patient declined   Food Insecurity: Patient Declined (2024)    Hunger Vital Sign     Worried About Running Out of Food in the Last Year: Patient declined     Ran Out of  Food in the Last Year: Patient declined   Transportation Needs: Patient Declined (1/12/2024)    PRAPARE - Transportation     Lack of Transportation (Medical): Patient declined     Lack of Transportation (Non-Medical): Patient declined   Physical Activity: Sufficiently Active (1/12/2024)    Exercise Vital Sign     Days of Exercise per Week: 7 days     Minutes of Exercise per Session: 50 min   Stress: Patient Declined (1/12/2024)    [place] [employer] - Occupational Stress Questionnaire     Feeling of Stress : Patient declined   Social Connections: Unknown (1/12/2024)    Social Connection and Isolation Panel [NHANES]     Frequency of Communication with Friends and Family: Patient declined     Frequency of Social Gatherings with Friends and Family: Patient declined     Active Member of Clubs or Organizations: Patient declined     Attends Club or Organization Meetings: Patient declined     Marital Status: Patient declined   Housing Stability: Patient Declined (1/12/2024)    Housing Stability Vital Sign     Unable to Pay for Housing in the Last Year: Patient declined     Unstable Housing in the Last Year: Patient declined       MEDICATIONS & ALLERGIES:     Current Outpatient Medications on File Prior to Visit   Medication Sig Dispense Refill    aspirin (ECOTRIN) 81 MG EC tablet Take 1 tablet (81 mg total) by mouth once daily. 30 tablet 11    atorvastatin (LIPITOR) 40 MG tablet Take 1 tablet (40 mg total) by mouth once daily. 90 tablet 3    warfarin (COUMADIN) 5 MG tablet Take 1 tablet (5 mg total) by mouth Daily. 30 tablet 11     Current Facility-Administered Medications on File Prior to Visit   Medication Dose Route Frequency Provider Last Rate Last Admin    0.9%  NaCl infusion   Intravenous Continuous Wing Yeung MD            Review of patient's allergies indicates:  No Known Allergies    OBJECTIVE:     Vital Signs:  Vitals:    01/25/24 0835   BP: 118/77   Pulse: 69   Weight: 64.1 kg (141 lb  "5 oz)   Height: 5' 4" (1.626 m)     Wt Readings from Last 3 Encounters:   01/25/24 0835 64.1 kg (141 lb 5 oz)   01/18/24 1015 65.3 kg (144 lb)   01/17/24 0600 65.3 kg (144 lb)   01/16/24 0600 65 kg (143 lb 6.4 oz)   01/13/24 2346 65.5 kg (144 lb 6.4 oz)   01/11/24 1925 65.6 kg (144 lb 10 oz)   01/10/24 1018 65.8 kg (145 lb)     Body mass index is 24.26 kg/m².        Physical Exam:  /77   Pulse 69   Ht 5' 4" (1.626 m)   Wt 64.1 kg (141 lb 5 oz)   LMP 01/04/2024   BMI 24.26 kg/m²   General appearance: Alert, cooperative, no distress  Constitutional: Oriented to person, place, and time. Appears well-developed and well-nourished.  HEENT: Normocephalic, atraumatic, neck symmetrical, no nasal discharge   Eyes: Conjunctivae/corneas clear, EOM's intact  Extremities: extremities symmetric; no edema  Integument: Skin color, texture, turgor normal  Neurologic: Alert and oriented X 3, normal strength, normal coordination and gait  Psychiatric: no pressured speech; normal affect; no evidence of impaired cognition     Laboratory  Lab Results   Component Value Date    WBC 6.56 01/17/2024    HGB 12.5 01/17/2024    HCT 35.9 (L) 01/17/2024    MCV 89 01/17/2024     01/17/2024     BMP  Lab Results   Component Value Date     01/17/2024    K 4.4 01/17/2024     01/17/2024    CO2 22 (L) 01/17/2024    BUN 13 01/17/2024    CREATININE 0.7 01/17/2024    CALCIUM 9.9 01/17/2024    ANIONGAP 9 01/17/2024    EGFRNORACEVR >60 01/17/2024     Lab Results   Component Value Date    ALT 54 (H) 01/17/2024    AST 32 01/17/2024    ALKPHOS 50 (L) 01/17/2024    BILITOT 0.1 01/17/2024     Lab Results   Component Value Date    INR 2.3 (H) 01/22/2024    INR 1.9 (H) 01/18/2024    INR 2.6 (H) 01/17/2024     Lab Results   Component Value Date    HGBA1C 4.9 12/18/2023     No results for input(s): "POCTGLUCOSE" in the last 72 hours.    Diagnostic Results:    CTA A/P  Impression:     Abnormal enhancement of the spleen concerning for areas " of splenic infarction.  There is a trace of fluid in the subcapsular region of the inferior pole of the spleen, short-term follow-up advised, likely due to recent splenic areas of infarction.     Normal appearance of the aorta and its branches.  No significant atherosclerotic plaque.    ASSESSMENT & PLAN:     Splenic infarct   -Pain improved. Taking anticoagulant as prescribed.     Long term (current) use of anticoagulants   -Following with coumadin clinic. Doing well.     Antiphospholipid syndrome   -Workup ongoing. Will follow up with Dr. Watts as scheduled. Printed rheumatology referral and advised to look for sooner rheumatology appointment. Can start with [facility] multispecialty clinics. Phone numbers given today.     History of cerebrovascular accident (CVA) due to embolism   -Stable. No deficits.     Hyperlipidemia, unspecified hyperlipidemia type   -Working on lifestyle. LDL down ~50 points.       Scheduled Follow-up :  Future Appointments   Date Time Provider Department Center   1/25/2024  9:30 AM LAB, [facility] [place]   2/7/2024  8:20 AM Wing Watts MD [facility] HEM ONC [facility]   2/7/2024 10:00 AM Rm Simpson MD [facility] LSUFMRE [facility]   2/14/2024  3:30 PM Leroy Mancilla MD [facility] C[facility]   4/8/2024  8:30 AM Yazmin Hoffman MD [facility] OBGYN [place]   5/13/2024  1:00 PM Inga Schmitz MD [facility] RHEUM [place]       Post Visit Medication List:     Medication List            Accurate as of January 25, 2024  9:17 AM. If you have any questions, ask your nurse or doctor.                CONTINUE taking these medications      aspirin 81 MG EC tablet  Commonly known as: ECOTRIN  Take 1 tablet (81 mg total) by mouth once daily.     atorvastatin 40 MG tablet  Commonly known as: LIPITOR  Take 1 tablet (40 mg total) by mouth once daily.     warfarin 5 MG tablet  Commonly known as: COUMADIN  Take 1 tablet (5 mg total) by mouth Daily.              Signing Provider:  Angel MORAN  AARON Mckeon

## 2024-01-25 NOTE — PROGRESS NOTES
[facility]    2024 1:26 PM    Assessment/Plan:    Patient presents today with therapeutic INR.    Assessment of patient findings and chart review: reviewed    Recommendation for patient's warfarin regimen: Continue current maintenance dose    Recommend repeat INR in 4 days  _________________________________________________________________    Rhondameera Kinney Seymour (44 y.o.) is followed by the "[employer] Anticoagulation Management Program.    Anticoagulation Summary  As of 2024      INR goal:  2.0-3.0   TTR:  --   INR used for dosin.3 (2024)   Warfarin maintenance plan:  7.5 mg (5 mg x 1.5) every day   Weekly warfarin total:  52.5 mg   Plan last modified:  Blanca Hutton, PharmD (2024)   Next INR check:  2024   Target end date:      Indications    Embolic stroke [I63.9]  Splenic infarct [D73.5]  Antiphospholipid syndrome [D68.61]  Long term (current) use of anticoagulants [Z79.01]                 Anticoagulation Episode Summary       INR check location:      Preferred lab:      Send INR reminders to:  [facility] COUMADIN MONITORING POOL    Comments:  [facility]          Anticoagulation Care Providers       Provider Role Specialty Phone number    Wing Watts MD  Hematology and Oncology [phone]

## 2024-01-29 ENCOUNTER — LAB VISIT (OUTPATIENT)
Dept: LAB | Facility: HOSPITAL | Age: 44
End: 2024-01-29
Attending: INTERNAL MEDICINE
Payer: MEDICAID

## 2024-01-29 ENCOUNTER — ANTI-COAG VISIT (OUTPATIENT)
Dept: CARDIOLOGY | Facility: CLINIC | Age: 44
End: 2024-01-29
Payer: MEDICAID

## 2024-01-29 DIAGNOSIS — Z79.01 LONG TERM (CURRENT) USE OF ANTICOAGULANTS: ICD-10-CM

## 2024-01-29 DIAGNOSIS — D73.5 SPLENIC INFARCT: Primary | ICD-10-CM

## 2024-01-29 DIAGNOSIS — D68.61 ANTIPHOSPHOLIPID SYNDROME: ICD-10-CM

## 2024-01-29 DIAGNOSIS — D73.5 SPLENIC INFARCT: ICD-10-CM

## 2024-01-29 LAB
INR PPP: 2.5 (ref 0.8–1.2)
PROTHROMBIN TIME: 25.4 SEC (ref 9–12.5)

## 2024-01-29 PROCEDURE — 36415 COLL VENOUS BLD VENIPUNCTURE: CPT | Performed by: INTERNAL MEDICINE

## 2024-01-29 PROCEDURE — 93793 ANTICOAG MGMT PT WARFARIN: CPT | Mod: ,,,

## 2024-01-29 PROCEDURE — 85610 PROTHROMBIN TIME: CPT | Performed by: INTERNAL MEDICINE

## 2024-02-04 NOTE — PROGRESS NOTES
PATIENT: Rhonda Ahuja  MRN: [id number]  DATE: 2024    Diagnosis:   1. Antiphospholipid syndrome    2. History of stroke    3. Primary hypercoagulable state    4. Splenic infarct    5. Elevated liver function tests    6. Other hyperlipidemia      Chief Complaint: splenic infarct    Subjective:    History of Present Illness: Ms. Ahuja is a 44 y.o. female who presented in 2023 for evaluation and management of potential clotting disorder. She was referred by Dr. Simpson.    - in 2023, patient had a large middle cerebral artery stroke (presenting symptom was worsening expressive aphasia). She underwent mechanical thrombectomy on 23.  - labs during the hospitalization revealed an elevated beta-2 glycoprotein IgM antibody.    - she reports a miscarriage at about 9 weeks gestational age about 15 years ago.    Interval history:  - she presents for a follow-up appointment for her antiphospholipid syndrome  - she was hospitalized in 2024 for a splenic infarct. She was started on warfarin.  - today, she is doing better. She denies shortness of breath, chest pain, vomiting, diarrhea, constipation.  - she is scheduled to see rheumatology in May 2024.      Past medical, surgical, family, and social histories have been reviewed and updated below.    Past Medical History:   Past Medical History:   Diagnosis Date    HLD (hyperlipidemia) 2023    Stroke        Past Surgical History:   Past Surgical History:   Procedure Laterality Date    BREAST BIOPSY Right     excisional benign    BUNIONECTOMY       SECTION      x 2    TONSILLECTOMY      TRANSESOPHAGEAL ECHOCARDIOGRAPHY N/A 2023    TRANSESOPHAGEAL ECHOCARDIOGRAPHY N/A 2023    Procedure: ECHOCARDIOGRAM, TRANSESOPHAGEAL;  Surgeon: Wing Yeung MD;  Location: [facility] CATH LAB;  Service: Cardiology;  Laterality: N/A;       Family History:   Family History   Problem Relation Age of Onset    Hypertension Mother     Diabetes  Mother     ALS Mother     Heart attack Father     Coronary artery disease Father     No Known Problems Sister     No Known Problems Brother     Breast cancer Maternal Grandmother     Colon cancer Neg Hx     Ovarian cancer Neg Hx        Social History:  reports that she has never smoked. She has never used smokeless tobacco. She reports that she does not currently use alcohol. She reports that she does not use drugs.    Allergies:  Review of patient's allergies indicates:  No Known Allergies    Medications:  Current Outpatient Medications   Medication Sig Dispense Refill    aspirin (ECOTRIN) 81 MG EC tablet Take 1 tablet (81 mg total) by mouth once daily. 30 tablet 11    atorvastatin (LIPITOR) 40 MG tablet Take 1 tablet (40 mg total) by mouth once daily. 90 tablet 3    warfarin (COUMADIN) 5 MG tablet Take 1 tablet (5 mg total) by mouth Daily. 30 tablet 11     No current facility-administered medications for this visit.     Facility-Administered Medications Ordered in Other Visits   Medication Dose Route Frequency Provider Last Rate Last Admin    0.9%  NaCl infusion   Intravenous Continuous Wing Yeung MD           Review of Systems   Constitutional:  Negative for appetite change, fatigue and unexpected weight change.   HENT:  Negative for mouth sores and sore throat.    Respiratory:  Negative for cough and shortness of breath.    Cardiovascular:  Negative for chest pain.   Gastrointestinal:  Negative for abdominal pain, constipation, diarrhea and vomiting.   Genitourinary:  Negative for dysuria and frequency.   Musculoskeletal:  Negative for back pain.   Skin:  Negative for rash.   Neurological:  Negative for headaches.   Hematological:  Negative for adenopathy.   Psychiatric/Behavioral:  The patient is not nervous/anxious.        ECOG Performance Status:   ECOG SCORE    0 - Fully active-able to carry on all pre-disease performance without restriction         Objective:      Vitals:   Vitals:    02/07/24 0830    BP: 118/65   BP Location: Right arm   Patient Position: Sitting   BP Method: Medium (Automatic)   Pulse: 70   Resp: 16   SpO2: 100%   Weight: 64.6 kg (142 lb 6.7 oz)     BMI: Body mass index is 24.45 kg/m².    Physical Exam  Vitals and nursing note reviewed.   Constitutional:       Appearance: She is well-developed.   HENT:      Head: Normocephalic and atraumatic.   Eyes:      Pupils: Pupils are equal, round, and reactive to light.   Cardiovascular:      Rate and Rhythm: Normal rate and regular rhythm.   Pulmonary:      Effort: Pulmonary effort is normal.      Breath sounds: Normal breath sounds.   Abdominal:      General: Bowel sounds are normal.      Palpations: Abdomen is soft.   Musculoskeletal:         General: Normal range of motion.      Cervical back: Normal range of motion and neck supple.   Skin:     General: Skin is warm and dry.   Neurological:      Mental Status: She is alert and oriented to person, place, and time.   Psychiatric:         Behavior: Behavior normal.         Thought Content: Thought content normal.         Judgment: Judgment normal.         Laboratory Data:  Labs have been reviewed.    Lab Results   Component Value Date    WBC 6.56 01/17/2024    HGB 12.5 01/17/2024    HCT 35.9 (L) 01/17/2024    MCV 89 01/17/2024     01/17/2024       1/10/24:    Anticardiolipin IgA negative    Factor 5 leiden: normal      Imaging:    Assessment:       1. Antiphospholipid syndrome    2. History of stroke    3. Primary hypercoagulable state    4. Splenic infarct    5. Elevated liver function tests    6. Other hyperlipidemia           Plan:     Antiphospholipid syndrome / history of stroke / splenic infarct.  - I have reviewed her chart  - in September 2023, patient had a large middle cerebral artery stroke (presenting symptom was worsening expressive aphasia). She underwent mechanical thrombectomy on 9/23/23.  - labs during the hospitalization (September 2023) revealed an elevated beta-2  glycoprotein IgM antibody.  - she reports a miscarriage at about 9 weeks gestational age about 15 years ago.  - she was hospitalized in January 2024 for a splenic infarct. She was started on warfarin.  - repeat antiphospholipid testing revealed continued elevated of beta-2 glycoprotein antibody. I felt that she met criteria for antiphospholipid syndrome.  - defer management of warfarin/INR monitoring to warfarin clinic.  - she is scheduled to see rheumatology in May 2024. I will check her chart after her rheumatology appointment.  - return to clinic as needed.    2. Elevated liver function tests  - mild elevation of ALT during hospitalization  - check CMP today    3. Hyperlipidemia  - check lipid panel per her request.    4. Family history of cancer  - refer to cancer genetics per her request.    5. Advance Care Planning       Power of    After our discussion (at previous visit), the patient decided to complete a HCPOA and appointed her  boyfriend Conrad Jennings ([phone]) and sister Meera Licea ([phone]) .         - return to clinic as needed.    Wing Watts M.D.  Hematology/Oncology  [facility] - [address], [address]  [place] [address]  Phone: [phone]  Fax: [phone]

## 2024-02-05 ENCOUNTER — LAB VISIT (OUTPATIENT)
Dept: LAB | Facility: HOSPITAL | Age: 44
End: 2024-02-05
Attending: INTERNAL MEDICINE
Payer: MEDICAID

## 2024-02-05 ENCOUNTER — ANTI-COAG VISIT (OUTPATIENT)
Dept: CARDIOLOGY | Facility: CLINIC | Age: 44
End: 2024-02-05
Payer: MEDICAID

## 2024-02-05 ENCOUNTER — TELEPHONE (OUTPATIENT)
Dept: CARDIOLOGY | Facility: CLINIC | Age: 44
End: 2024-02-05
Payer: MEDICAID

## 2024-02-05 DIAGNOSIS — Z79.01 LONG TERM (CURRENT) USE OF ANTICOAGULANTS: ICD-10-CM

## 2024-02-05 DIAGNOSIS — D68.61 ANTIPHOSPHOLIPID SYNDROME: ICD-10-CM

## 2024-02-05 DIAGNOSIS — D73.5 SPLENIC INFARCT: Primary | ICD-10-CM

## 2024-02-05 DIAGNOSIS — D73.5 SPLENIC INFARCT: ICD-10-CM

## 2024-02-05 LAB
INR PPP: 3.9 (ref 0.8–1.2)
PROTHROMBIN TIME: 38.2 SEC (ref 9–12.5)

## 2024-02-05 PROCEDURE — 36415 COLL VENOUS BLD VENIPUNCTURE: CPT | Performed by: INTERNAL MEDICINE

## 2024-02-05 PROCEDURE — 85610 PROTHROMBIN TIME: CPT | Performed by: INTERNAL MEDICINE

## 2024-02-05 PROCEDURE — 93793 ANTICOAG MGMT PT WARFARIN: CPT | Mod: ,,,

## 2024-02-05 NOTE — TELEPHONE ENCOUNTER
Cardiology appointment with Dr. Mancilla rescheduled to 2/22/24 @ 7:00 AM, provider out of office day of original appointment.

## 2024-02-05 NOTE — TELEPHONE ENCOUNTER
----- Message from Roxanne Domínguez sent at 2/5/2024  2:03 PM CST -----  Regarding: [facility] appt  Contact: @ [phone]  Pt is calling to speak with someone in the office to r/s appt that they are scheduled for 2/14/2024. No available appts in Epic. Pt is asking for a return portal message to r/s for a better date and time. Thanks.

## 2024-02-07 ENCOUNTER — TELEPHONE (OUTPATIENT)
Dept: HEMATOLOGY/ONCOLOGY | Facility: CLINIC | Age: 44
End: 2024-02-07

## 2024-02-07 ENCOUNTER — ANTI-COAG VISIT (OUTPATIENT)
Dept: CARDIOLOGY | Facility: CLINIC | Age: 44
End: 2024-02-07
Payer: MEDICAID

## 2024-02-07 ENCOUNTER — OFFICE VISIT (OUTPATIENT)
Dept: HEMATOLOGY/ONCOLOGY | Facility: CLINIC | Age: 44
End: 2024-02-07
Payer: MEDICAID

## 2024-02-07 ENCOUNTER — PATIENT MESSAGE (OUTPATIENT)
Dept: HEMATOLOGY/ONCOLOGY | Facility: CLINIC | Age: 44
End: 2024-02-07

## 2024-02-07 ENCOUNTER — OFFICE VISIT (OUTPATIENT)
Dept: FAMILY MEDICINE | Facility: HOSPITAL | Age: 44
End: 2024-02-07
Payer: MEDICAID

## 2024-02-07 VITALS
SYSTOLIC BLOOD PRESSURE: 104 MMHG | HEART RATE: 67 BPM | OXYGEN SATURATION: 94 % | BODY MASS INDEX: 24.35 KG/M2 | HEIGHT: 64 IN | WEIGHT: 142.63 LBS | DIASTOLIC BLOOD PRESSURE: 69 MMHG

## 2024-02-07 VITALS
OXYGEN SATURATION: 100 % | SYSTOLIC BLOOD PRESSURE: 118 MMHG | RESPIRATION RATE: 16 BRPM | HEART RATE: 70 BPM | WEIGHT: 142.44 LBS | DIASTOLIC BLOOD PRESSURE: 65 MMHG | BODY MASS INDEX: 24.45 KG/M2

## 2024-02-07 DIAGNOSIS — R79.89 ELEVATED LIVER FUNCTION TESTS: ICD-10-CM

## 2024-02-07 DIAGNOSIS — D68.61 ANTIPHOSPHOLIPID SYNDROME: Primary | ICD-10-CM

## 2024-02-07 DIAGNOSIS — E78.5 HYPERLIPIDEMIA, UNSPECIFIED HYPERLIPIDEMIA TYPE: ICD-10-CM

## 2024-02-07 DIAGNOSIS — Z80.9 FAMILY HISTORY OF CANCER: ICD-10-CM

## 2024-02-07 DIAGNOSIS — D73.5 SPLENIC INFARCT: Primary | ICD-10-CM

## 2024-02-07 DIAGNOSIS — D68.61 ANTIPHOSPHOLIPID SYNDROME: ICD-10-CM

## 2024-02-07 DIAGNOSIS — Z28.21 IMMUNIZATION DECLINED: ICD-10-CM

## 2024-02-07 DIAGNOSIS — Z86.73 HISTORY OF STROKE: ICD-10-CM

## 2024-02-07 DIAGNOSIS — D68.59 PRIMARY HYPERCOAGULABLE STATE: ICD-10-CM

## 2024-02-07 DIAGNOSIS — Z79.01 LONG TERM (CURRENT) USE OF ANTICOAGULANTS: ICD-10-CM

## 2024-02-07 DIAGNOSIS — D73.5 SPLENIC INFARCT: ICD-10-CM

## 2024-02-07 DIAGNOSIS — E78.49 OTHER HYPERLIPIDEMIA: ICD-10-CM

## 2024-02-07 DIAGNOSIS — Z86.73 HISTORY OF CEREBROVASCULAR ACCIDENT (CVA) DUE TO EMBOLISM: ICD-10-CM

## 2024-02-07 PROCEDURE — 3078F DIAST BP <80 MM HG: CPT | Mod: CPTII,,, | Performed by: INTERNAL MEDICINE

## 2024-02-07 PROCEDURE — 3074F SYST BP LT 130 MM HG: CPT | Mod: CPTII,,, | Performed by: INTERNAL MEDICINE

## 2024-02-07 PROCEDURE — 1159F MED LIST DOCD IN RCRD: CPT | Mod: CPTII,,, | Performed by: INTERNAL MEDICINE

## 2024-02-07 PROCEDURE — 99213 OFFICE O/P EST LOW 20 MIN: CPT | Mod: 27 | Performed by: STUDENT IN AN ORGANIZED HEALTH CARE EDUCATION/TRAINING PROGRAM

## 2024-02-07 PROCEDURE — 3008F BODY MASS INDEX DOCD: CPT | Mod: CPTII,,, | Performed by: INTERNAL MEDICINE

## 2024-02-07 PROCEDURE — 99999 PR PBB SHADOW E&M-EST. PATIENT-LVL III: CPT | Mod: PBBFAC,,, | Performed by: INTERNAL MEDICINE

## 2024-02-07 PROCEDURE — 99213 OFFICE O/P EST LOW 20 MIN: CPT | Mod: PBBFAC,PO | Performed by: INTERNAL MEDICINE

## 2024-02-07 PROCEDURE — 1111F DSCHRG MED/CURRENT MED MERGE: CPT | Mod: CPTII,,, | Performed by: INTERNAL MEDICINE

## 2024-02-07 PROCEDURE — 1160F RVW MEDS BY RX/DR IN RCRD: CPT | Mod: CPTII,,, | Performed by: INTERNAL MEDICINE

## 2024-02-07 PROCEDURE — 99214 OFFICE O/P EST MOD 30 MIN: CPT | Mod: S$PBB,,, | Performed by: INTERNAL MEDICINE

## 2024-02-07 NOTE — PROGRESS NOTES
Progress Note  [facility]    Subjective:     Rhonda Ahuja is a 44 y.o. year old female with Recent Dx of Antiphospholipid syndrome who presents to clinic for:    CC:  F/U     Antiphospholipid syndrome:   - CVA s/p hospitalization on 9/23.  - Hospitalized again recently 2/2 to abdominal pain and was found to have splenic infarct.   - She was started on AC and an appropriate pain regimen.  - Eventually bridged to Warfarin.  - Currently seeing heme/onc for workup for management of APLS (saw today).   - Follows with coumadin clinic.   - Plan to have patient establish with rheumatology in the following weeks for further management.  - Patient does not present with any symptoms or concerns.  - Will see cardiology for a potential loop recorder to check for A-fib.  - Patient otherwise healthy and only has significantly elevated LDL, recently started on appropriated statin therapy, with improvement of LDL.  - Given her recent Dx, patient declines all vaccines today.    Woman's Health:  - Follows OBGYN.  - Initially trying to conceive with long term male partner, but given recent Dx and advancing age, unsure at this time.  - Will defer contraception discussion and pap smear for OBGYN apt.          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    Embolic stroke 12/26/2023    Unspecified visual disturbance 12/18/2023    History of cerebrovascular accident (CVA) due to embolism 12/18/2023    Word finding difficulty 12/01/2023    HLD (hyperlipidemia) 09/24/2023        (Not in a hospital admission)    Review of patient's allergies indicates:  No Known Allergies     Past Medical History:   Diagnosis Date    HLD (hyperlipidemia) 09/24/2023    Stroke       Past Surgical History:   Procedure Laterality Date  "   BREAST BIOPSY Right     excisional benign    BUNIONECTOMY       SECTION      x 2    TONSILLECTOMY      TRANSESOPHAGEAL ECHOCARDIOGRAPHY N/A 2023    TRANSESOPHAGEAL ECHOCARDIOGRAPHY N/A 2023    Procedure: ECHOCARDIOGRAM, TRANSESOPHAGEAL;  Surgeon: Wing Yeung MD;  Location: [facility] CATH LAB;  Service: Cardiology;  Laterality: N/A;      Family History   Problem Relation Age of Onset    Hypertension Mother     Diabetes Mother     ALS Mother     Heart attack Father     Coronary artery disease Father     No Known Problems Sister     No Known Problems Brother     Breast cancer Maternal Grandmother     Colon cancer Neg Hx     Ovarian cancer Neg Hx       Social History     Tobacco Use    Smoking status: Never    Smokeless tobacco: Never   Substance Use Topics    Alcohol use: Not Currently      Review of Systems   Constitutional:        As per HPI, otherwise negative        Objective:     Vitals:    24 0951   BP: 104/69   Pulse: 67   SpO2: (!) 94%   Weight: 64.7 kg (142 lb 10.2 oz)   Height: 5' 4" (1.626 m)     Estimated body mass index is 24.48 kg/m² as calculated from the following:    Height as of this encounter: 5' 4" (1.626 m).    Weight as of this encounter: 64.7 kg (142 lb 10.2 oz).     Physical Exam  Constitutional:       Appearance: Normal appearance. She is normal weight.   HENT:      Head: Normocephalic and atraumatic.      Right Ear: External ear normal.      Left Ear: External ear normal.      Mouth/Throat:      Mouth: Mucous membranes are moist.      Pharynx: Oropharynx is clear.   Eyes:      Extraocular Movements: Extraocular movements intact.      Conjunctiva/sclera: Conjunctivae normal.      Pupils: Pupils are equal, round, and reactive to light.   Cardiovascular:      Rate and Rhythm: Normal rate and regular rhythm.      Pulses: Normal pulses.      Heart sounds: Normal heart sounds.   Pulmonary:      Effort: Pulmonary effort is normal.      Breath sounds: Normal breath sounds. "   Abdominal:      General: Abdomen is flat. Bowel sounds are normal.      Tenderness: There is no abdominal tenderness.   Musculoskeletal:      Cervical back: Normal range of motion.      Right lower leg: No edema.      Left lower leg: No edema.   Skin:     Capillary Refill: Capillary refill takes less than 2 seconds.   Neurological:      General: No focal deficit present.      Mental Status: She is alert and oriented to person, place, and time. Mental status is at baseline.   Psychiatric:         Mood and Affect: Mood normal.         Behavior: Behavior normal.         Thought Content: Thought content normal.         Assessment/Plan:       Antiphospholipid syndrome:  - Assessment: The patient has a recent diagnosis of Antiphospholipid syndrome (APLS), evidenced by a history of cerebrovascular accident (CVA), splenic infarct, and associated lab work findings. She is on anticoagulation therapy and has been bridged to Warfarin. She is following up with heme/onc and coumadin clinic, and plans to establish with rheumatology are in progress. She shows no current symptoms or concerns related to APLS.   - Plan:     - Continue anticoagulation therapy and monitor for any bleeding or clotting complications.    - Follow up with heme/onc for further management of APLS.    - Establish with a rheumatologist for comprehensive care.    - F/u cards for potential loop recorder to monitor for A-fib.    History of cerebrovascular accident (CVA) due to embolism:  - Assessment: The patient had a CVA secondary to embolism, likely related to APLS. She has been started on anticoagulation therapy to prevent future thrombotic events.   - Plan:     - Continue anticoagulation therapy to prevent further thrombotic events.    - F/u cards for potential loop recorder to monitor for arrhythmias that may contribute to stroke risk.    Hyperlipidemia, unspecified hyperlipidemia type:  - Assessment: The patient has significantly elevated LDL levels,  indicating hyperlipidemia. She has recently started on statin therapy, with noted improvement in her LDL levels.  - Plan:     - Continue statin therapy to manage hyperlipidemia.    - Regular monitoring of lipid panel to assess response to therapy and adjust medications as needed.    Immunization declined:  - Assessment: The patient declined all vaccines today due to her recent diagnosis of APLS. It is essential to discuss the benefits and risks of vaccines in the context of her condition.  - Plan:     - Revisit vaccine discussion at future visits, emphasizing the importance of immunizations in preventing infections that could potentially complicate her condition.    - Assess patient's understanding of her condition and its potential impacts on her overall health.       Patient instructed to receive or provide proof of all deficient vaccines in chart - patient verbalized understanding      In regards to A&P, patient verbalized understanding and agreeable to plan      Follow-up: 4 months HLD f/u      Case discussed with staff: Dr. Malhotra      This note was partially created using [employer] Voice Recognition software. Typographical and content errors may occur with this process. While efforts are made to detect and correct such errors, in some cases errors will persist. For this reason, wording in this document should be considered in the proper context and not strictly verbatim.    The following information is provided to all patients.  This information is to help you find resources for any of the problems found today that may be affecting your health:                Living healthy guide: www.[facility].[place].gov       Understanding Diabetes: www.diabetes.org       Eating healthy: www.cdc.gov/healthyweight      CDC home safety checklist: www.cdc.gov/steadi/patient.html      Agency on Aging: www.goea.[place].gov       Alcoholics anonymous (AA): www.aa.org      Physical Activity: www.lubna.nih.gov/za7yznv       Tobacco  use: www.quitwithusla.org        Rm Simpson MD  [facility], PGY-3  02/07/2024

## 2024-02-14 ENCOUNTER — LAB VISIT (OUTPATIENT)
Dept: LAB | Facility: HOSPITAL | Age: 44
End: 2024-02-14
Attending: INTERNAL MEDICINE
Payer: MEDICAID

## 2024-02-14 ENCOUNTER — ANTI-COAG VISIT (OUTPATIENT)
Dept: CARDIOLOGY | Facility: CLINIC | Age: 44
End: 2024-02-14
Payer: MEDICAID

## 2024-02-14 DIAGNOSIS — Z79.01 LONG TERM (CURRENT) USE OF ANTICOAGULANTS: ICD-10-CM

## 2024-02-14 DIAGNOSIS — D68.61 ANTIPHOSPHOLIPID SYNDROME: ICD-10-CM

## 2024-02-14 DIAGNOSIS — D73.5 SPLENIC INFARCT: Primary | ICD-10-CM

## 2024-02-14 DIAGNOSIS — D73.5 SPLENIC INFARCT: ICD-10-CM

## 2024-02-14 LAB
INR PPP: 2.2 (ref 0.8–1.2)
PROTHROMBIN TIME: 22.8 SEC (ref 9–12.5)

## 2024-02-14 PROCEDURE — 93793 ANTICOAG MGMT PT WARFARIN: CPT | Mod: ,,,

## 2024-02-14 PROCEDURE — 36415 COLL VENOUS BLD VENIPUNCTURE: CPT | Performed by: INTERNAL MEDICINE

## 2024-02-14 PROCEDURE — 85610 PROTHROMBIN TIME: CPT | Performed by: INTERNAL MEDICINE

## 2024-02-16 NOTE — EICU
Intervention Initiated From:  COR / EICU    Silvia intervened regarding:  Pain and Other    Nurse Notified: Harper BAZAN RN  Yes    Doctor Notified:  No    Comments: Video into unit. Patient is slightly nauseated dry hiving. Light is bothering her. Contacted BS nurse. VSS.   
normal...

## 2024-02-19 NOTE — PROGRESS NOTES
I assume primary medical responsibility for this patient. I have reviewed the history, physical, and assessement & treatment plan with the resident and agree that the care is reasonable and necessary. This service has been performed by a resident without the presence of a teaching physician under the primary care exception. If necessary, an addendum of additional findings or evaluation beyond the resident documentation will be noted below.    Concerns with warfarin and long term goal with conception. She and her baby would be high risk. She plans to follow up with her obgyn.

## 2024-02-21 ENCOUNTER — TELEPHONE (OUTPATIENT)
Dept: CARDIOLOGY | Facility: CLINIC | Age: 44
End: 2024-02-21
Payer: MEDICAID

## 2024-02-21 NOTE — TELEPHONE ENCOUNTER
----- Message from Yee Swan sent at 2/21/2024  3:22 PM CST -----  Consult/Advisory    Name Of Caller:Rhonda       Contact Preference:[phone]    Nature of call: Ptn called to reschedule she has COVID and nothing  is showing avail

## 2024-02-21 NOTE — TELEPHONE ENCOUNTER
I spoke with patient, canceled appt scheduled on tomorrow with Dr Mancilla due to her testing positive for Covid, previous follow up canceled due to Dr Mancilla being out of office.   Patient asking for a call from Dr. Mancilla to speak about abnormal EKG that he read from her hospital stay also asking for order for calcium scoring test from Dr Mancilla as she was informed at hospital discharge that her cardiologt would order.

## 2024-02-28 ENCOUNTER — ANTI-COAG VISIT (OUTPATIENT)
Dept: CARDIOLOGY | Facility: CLINIC | Age: 44
End: 2024-02-28
Payer: MEDICAID

## 2024-02-28 ENCOUNTER — LAB VISIT (OUTPATIENT)
Dept: LAB | Facility: HOSPITAL | Age: 44
End: 2024-02-28
Attending: INTERNAL MEDICINE
Payer: MEDICAID

## 2024-02-28 DIAGNOSIS — D68.61 ANTIPHOSPHOLIPID SYNDROME: ICD-10-CM

## 2024-02-28 DIAGNOSIS — D73.5 SPLENIC INFARCT: Primary | ICD-10-CM

## 2024-02-28 DIAGNOSIS — Z79.01 LONG TERM (CURRENT) USE OF ANTICOAGULANTS: ICD-10-CM

## 2024-02-28 DIAGNOSIS — D73.5 SPLENIC INFARCT: ICD-10-CM

## 2024-02-28 LAB
INR PPP: 3.1 (ref 0.8–1.2)
PROTHROMBIN TIME: 30.8 SEC (ref 9–12.5)

## 2024-02-28 PROCEDURE — 93793 ANTICOAG MGMT PT WARFARIN: CPT | Mod: ,,,

## 2024-02-28 PROCEDURE — 36415 COLL VENOUS BLD VENIPUNCTURE: CPT | Performed by: INTERNAL MEDICINE

## 2024-02-28 PROCEDURE — 85610 PROTHROMBIN TIME: CPT | Performed by: INTERNAL MEDICINE

## 2024-02-28 NOTE — PROGRESS NOTES
INR a tad high but acceptable. Medications, chart, and patient findings reviewed. See calendar for adjustments to dose and follow up plan.

## 2024-03-06 ENCOUNTER — ANTI-COAG VISIT (OUTPATIENT)
Dept: CARDIOLOGY | Facility: CLINIC | Age: 44
End: 2024-03-06
Payer: MEDICAID

## 2024-03-06 ENCOUNTER — LAB VISIT (OUTPATIENT)
Dept: LAB | Facility: HOSPITAL | Age: 44
End: 2024-03-06
Attending: INTERNAL MEDICINE
Payer: MEDICAID

## 2024-03-06 DIAGNOSIS — Z79.01 LONG TERM (CURRENT) USE OF ANTICOAGULANTS: ICD-10-CM

## 2024-03-06 DIAGNOSIS — D68.61 ANTIPHOSPHOLIPID SYNDROME: ICD-10-CM

## 2024-03-06 DIAGNOSIS — D73.5 SPLENIC INFARCT: ICD-10-CM

## 2024-03-06 DIAGNOSIS — D73.5 SPLENIC INFARCT: Primary | ICD-10-CM

## 2024-03-06 LAB
INR PPP: 4.6 (ref 0.8–1.2)
PROTHROMBIN TIME: 44.7 SEC (ref 9–12.5)

## 2024-03-06 PROCEDURE — 85610 PROTHROMBIN TIME: CPT | Performed by: INTERNAL MEDICINE

## 2024-03-06 PROCEDURE — 93793 ANTICOAG MGMT PT WARFARIN: CPT | Mod: ,,,

## 2024-03-06 PROCEDURE — 36415 COLL VENOUS BLD VENIPUNCTURE: CPT | Performed by: INTERNAL MEDICINE

## 2024-03-06 NOTE — PROGRESS NOTES
INR high. Pt reports she is on her menstrual cycle and has taken tylenol. Medications, chart, and patient findings reviewed. See calendar for adjustments to dose and follow up plan.

## 2024-03-13 ENCOUNTER — LAB VISIT (OUTPATIENT)
Dept: LAB | Facility: HOSPITAL | Age: 44
End: 2024-03-13
Attending: INTERNAL MEDICINE
Payer: MEDICAID

## 2024-03-13 ENCOUNTER — TELEPHONE (OUTPATIENT)
Dept: HEMATOLOGY/ONCOLOGY | Facility: CLINIC | Age: 44
End: 2024-03-13
Payer: MEDICAID

## 2024-03-13 ENCOUNTER — ANTI-COAG VISIT (OUTPATIENT)
Dept: CARDIOLOGY | Facility: CLINIC | Age: 44
End: 2024-03-13
Payer: MEDICAID

## 2024-03-13 DIAGNOSIS — D73.5 SPLENIC INFARCT: ICD-10-CM

## 2024-03-13 DIAGNOSIS — Z79.01 LONG TERM (CURRENT) USE OF ANTICOAGULANTS: ICD-10-CM

## 2024-03-13 DIAGNOSIS — D68.61 ANTIPHOSPHOLIPID SYNDROME: ICD-10-CM

## 2024-03-13 DIAGNOSIS — D73.5 SPLENIC INFARCT: Primary | ICD-10-CM

## 2024-03-13 LAB
INR PPP: 2.3 (ref 0.8–1.2)
PROTHROMBIN TIME: 23 SEC (ref 9–12.5)

## 2024-03-13 PROCEDURE — 36415 COLL VENOUS BLD VENIPUNCTURE: CPT | Performed by: INTERNAL MEDICINE

## 2024-03-13 PROCEDURE — 85610 PROTHROMBIN TIME: CPT | Performed by: INTERNAL MEDICINE

## 2024-03-13 PROCEDURE — 93793 ANTICOAG MGMT PT WARFARIN: CPT | Mod: ,,,

## 2024-03-14 ENCOUNTER — OFFICE VISIT (OUTPATIENT)
Dept: HEMATOLOGY/ONCOLOGY | Facility: CLINIC | Age: 44
End: 2024-03-14
Payer: MEDICAID

## 2024-03-14 DIAGNOSIS — Z80.6 FAMILY HISTORY OF LEUKEMIA: ICD-10-CM

## 2024-03-14 DIAGNOSIS — Z80.9 FAMILY HISTORY OF CANCER: ICD-10-CM

## 2024-03-14 DIAGNOSIS — Z80.3 FAMILY HISTORY OF BREAST CANCER: Primary | ICD-10-CM

## 2024-03-14 PROCEDURE — 96040 PR GENETIC COUNSELING, EACH 30 MIN: CPT | Mod: ,,, | Performed by: GENETIC COUNSELOR, MS

## 2024-03-14 PROCEDURE — 99999 PR PBB SHADOW E&M-EST. PATIENT-LVL III: CPT | Mod: PBBFAC,,, | Performed by: GENETIC COUNSELOR, MS

## 2024-03-14 PROCEDURE — 99213 OFFICE O/P EST LOW 20 MIN: CPT | Mod: PBBFAC | Performed by: GENETIC COUNSELOR, MS

## 2024-03-14 NOTE — PROGRESS NOTES
"Cancer Genetics  Hereditary and High-Risk Clinic  Department of Hematology and Oncology  [facility]    [facility]    Date of Service:  3/14/24  Visit Provider:  Rosa Kennedy, [facility] [place]    Patient ID  Name: Rhonda Ahuja    : 1980    MRN: [id number]      Referring Provider  Wing Watts MD  [address]  [address]  [place] [address]    IMPRESSION     Rhonda is a 43yo female with no personal history of cancer, but a maternal family history of breast cancer and leukemia that is not suspicious of a hereditary form of cancer, especially given the individual with breast cancer is not related to the individuals with leukemia. Rhonda does not meet any NCCN genetic testing criteria, and no genetic testing was ordered.    FOCUSED PERSONAL HISTORY     Chief Complaint: Genetic Evaluation (Family history of breast cancer and leukemia)    History of Present Illness (HPI):  Rhonda Ahuja ("Rhonda"), 44 y.o., assigned female sex at birth, is established with the [facility] but new to me.  She was referred by  Dr. Watts from Hematology and Oncology  for hereditary cancer risk assessment given her maternal family history of breast cancer and leukemia. Her mammograms have been normal, including her most recent on 24. 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 She has been evaluated by Dr. Watts and diagnosed with antiphospholipid syndrome. Her cholesterol is quite high, but has gone down somewhat with dietary changes (cut out processed foods and other high-cholesterol foods). She is scheduled to meet with Rheumatology on 3/27/24 for evaluation of a possible underlying autoimmune disorder.    -Lipid Panel on 23:  Total- 222  LDL- 155    -Lipid Panel on 23:  Total- 277  LDL- 200    Breast Cancer Risk Assessment Questionnaire  Age:  44 " "y.o.   Race/ethnicity:  White/Not  or /a  Weight:  142 lb  Height:  5'4"  Mammographic breast density:  heterogeneously dense  Age at menarche:  14y  Age at first live childbirth:  29y  Menopausal status:  premenopausal  Age at menopause, if applicable:  N/A  Hormone replacement therapy use history:  No  Breast biopsy history and findings:  No  Thoracic radiation therapy history:  No  Breast cancer susceptibility gene testing history:  None  Ashkenazi Confucianist ancestry:  No  Family history of breast cancer, ovarian cancer, and genetic testing:  See below    Tobacco Use  Tobacco Use: Low Risk  (3/14/2024)    Patient History     Smoking Tobacco Use: Never     Smokeless Tobacco Use: Never     Passive Exposure: Not on file   Recent Concern: Tobacco Use - Medium Risk (2023)    Patient History     Smoking Tobacco Use: Former     Smokeless Tobacco Use: Never     Passive Exposure: Not on file     Review of Systems   Patient's Distress Score today was 3/10 (with 10 being the worst).       FAMILY HISTORY         Rhonda reported her family history of cancer as follows:  Maternal grandmother: diagnosed with breast cancer at ~79yo and  later in 80s due to unrelated causes    Maternal grandfather: diagnosed with leukemia at unknown age and then found to have a recurrence and  at 69yo  Maternal great-aunt (grandfather's sister): diagnosed with leukemia in childhood and  at ~10yo    A family history of birth defects, intellectual disability, SIDS, sudden early death, multiple miscarriages and consanguinity were denied. Please refer to above pedigree for further details. A larger copy is available for review in the Media tab.     DISCUSSION      FAMILY HISTORY OF CANCER    The vast majority of cancers diagnosed are sporadic, while only 5-10% of most cancers types are hereditary. There are several distinguishing features of hereditary cancer, including multiple family members with cancer in multiple " generations, a young age at cancer diagnosis (<50), a family/personal history of multiple cancer diagnoses (multiple primary cancers), or a personal/family history of rare cancers (such as male breast cancer).  Individuals with a personal/family history of any of these may benefit from undergoing genetic testing to determine if a hereditary cause can be identified.    Rhonda does have a family history of breast cancer, however, her maternal grandmother is the only affected relative and was diagnosed at ~81yo.  This is not suspicious of a hereditary form of cancer. Separately, both Rhonda's maternal grandfather and his sister had leukemia, although the grandfather was diagnosed at an older age. It is unclear what types of leukemia they had (ALL and AML have a higher likelihood of a hereditary cause than other types of leukemias), and Rhonda did not know if any myeloproliferative disease preceded this diagnosis in her grandfather.  While genetic testing in her great-aunt would have been informative, there is a likely low-yield in genetic testing in Rhonda. She does not have a diagnosis of cancer and does not meet any NCCN criteria for genetic testing. Therefore, no genetic testing was ordered at this time. We discussed, however, that if there were any changes to her CBC that is indicative of myeloproliferative disease or thrombocytopenia, this would warrant further work-up including germline genetic testing.    Breast Cancer Risk Stratification   Current, Estimated Breast Cancer Risk Model Used Patient's Score Patient's Risk Category   5-year Violeta Model 0.8%  [] N/A given age <35   [x] Average risk (<1.7%)   [] Increased risk (?1.7%)   10-year Tyrer-Cuzick v8.0b 2.83%  [x] <5%   [] ?5%    Lifetime (to age 85) Tyrer-Cuzick v8.0b 13.63%  [x] Average risk (<15%)   [] Intermediate risk (?15% - <20%)   [] Increased risk (?20%)     There are differences between these models and how her personal and family history affects these  "risks. Rhonda appeared to have a good understanding of her risk to develop breast cancer. Due to her <20% lifetime risk to develop breast cancer, she is not eligible for breast MRI.     HYPERLIPIDEMIA    We briefly reviewed that there are hereditary forms of hypercholesterolemia, including Familial Hypercholesterolemia (FH) that results in high total (>310) and LDL (>190) cholesterol levels. While her initial lipid panel was concerning for FH (LDL was 200), she stated that she did not start taking her prescribed statin and was able to reduce her cholesterol with dietary changes alone. Additionally, her father's history of coronary artery disease and sudden death at 64yo increases the suspicion of a hereditary form of cardiovascular disease. We discussed that if her total and LDL levels rise to suspicious levels again, despite dietary changes, genetic testing for FH should be considered.    Rhonda received comprehensive counseling regarding hereditary forms of cancer. She appeared to have a good understanding of the material, as she asked appropriate questions.  She is encouraged to contact us in the future with any questions, concerns, or updates to her family history.     ASSESSMENT / PLAN      Rhonda Ahuja ("Rhonda"), 44 y.o., presented today for hereditary cancer risk assessment given her maternal family history of breast cancer and leukemia. Given her grandmother was diagnosed at an old age and there are no other cases of breast, ovarian, pancreatic or prostate cancer in the family, this is not suspicious of a hereditary cause. Rhonda does not meet any hereditary breast cancer genetic testing criteria. Separately, Rhonda's maternal grandfather and great-aunt's history of leukemia is somewhat suspicious of a hereditary cause, but the yield of genetic testing is quite low in an individual with no evidence of low blood counts, immunodeficiency or myeloproliferative disease who is a 3rd-degree relative of the most " suspicious case in the family. If Rhonda's personal history changes, this can be reassessed. No genetic testing was ordered at this time.    Separately, we discussed that her initial lipid panel is somewhat suspicious of Familial Hypercholesterolemia (FH), but that she was able to drastically reduce her total and LDL levels with dietary changes alone (without the use of a statin). If these levels rise again to 310 (total) and 190 (LDL) despite dietary changes, genetic testing for FH should be considered.      ICD-10-CM ICD-9-CM   1. Family history of cancer  Z80.9 V16.9     1. Family history of cancer  - Ambulatory referral/consult to Genetics  (Completed)      Follow-up:  No follow-ups on file.    Questions were encouraged and answered to the patient's satisfaction, and she verbalized understanding of the information and agreement with the plan.           Approximately 40 minutes were spent face-to-face with the patient.  Approximately 80 minutes in total were spent on this encounter, which includes face-to-face time and non-face-to-face time preparing to see the patient (e.g., review of tests), obtaining and/or reviewing separately obtained history, documenting clinical information in the electronic or other health record, independently interpreting results (not separately reported) and communicating results to the patient/family/caregiver, or care coordination (not separately reported).     This assessment is based on the history and reports provided, as well as the current scientific knowledge regarding cancer genetics.       Rosa Kennedy, [facility] [place], [facility]  Senior Genetic Counselor, Hereditary and High-Risk Clinic  Department of Hematology and Oncology  [facility]        CC:  Dr. Wing Watts

## 2024-03-20 ENCOUNTER — LAB VISIT (OUTPATIENT)
Dept: LAB | Facility: HOSPITAL | Age: 44
End: 2024-03-20
Attending: INTERNAL MEDICINE
Payer: MEDICAID

## 2024-03-20 ENCOUNTER — ANTI-COAG VISIT (OUTPATIENT)
Dept: CARDIOLOGY | Facility: CLINIC | Age: 44
End: 2024-03-20
Payer: MEDICAID

## 2024-03-20 DIAGNOSIS — D68.61 ANTIPHOSPHOLIPID SYNDROME: ICD-10-CM

## 2024-03-20 DIAGNOSIS — D73.5 SPLENIC INFARCT: ICD-10-CM

## 2024-03-20 DIAGNOSIS — Z79.01 LONG TERM (CURRENT) USE OF ANTICOAGULANTS: ICD-10-CM

## 2024-03-20 DIAGNOSIS — D73.5 SPLENIC INFARCT: Primary | ICD-10-CM

## 2024-03-20 LAB
INR PPP: 2.3 (ref 0.8–1.2)
PROTHROMBIN TIME: 23.3 SEC (ref 9–12.5)

## 2024-03-20 PROCEDURE — 93793 ANTICOAG MGMT PT WARFARIN: CPT | Mod: ,,,

## 2024-03-20 PROCEDURE — 85610 PROTHROMBIN TIME: CPT | Performed by: INTERNAL MEDICINE

## 2024-03-20 PROCEDURE — 36415 COLL VENOUS BLD VENIPUNCTURE: CPT | Performed by: INTERNAL MEDICINE

## 2024-03-21 ENCOUNTER — TELEPHONE (OUTPATIENT)
Dept: HEMATOLOGY/ONCOLOGY | Facility: CLINIC | Age: 44
End: 2024-03-21
Payer: MEDICAID

## 2024-03-21 DIAGNOSIS — D68.61 ANTIPHOSPHOLIPID SYNDROME: ICD-10-CM

## 2024-03-21 DIAGNOSIS — D68.61 ANTIPHOSPHOLIPID SYNDROME: Primary | ICD-10-CM

## 2024-03-21 RX ORDER — WARFARIN SODIUM 5 MG/1
5 TABLET ORAL DAILY
Qty: 30 TABLET | Refills: 11 | Status: SHIPPED | OUTPATIENT
Start: 2024-03-21 | End: 2024-03-21

## 2024-03-21 RX ORDER — WARFARIN SODIUM 5 MG/1
7.5 TABLET ORAL DAILY
Qty: 45 TABLET | Refills: 11 | Status: SHIPPED | OUTPATIENT
Start: 2024-03-21 | End: 2025-03-21

## 2024-03-21 RX ORDER — WARFARIN SODIUM 5 MG/1
5 TABLET ORAL DAILY
Qty: 30 TABLET | Refills: 11 | OUTPATIENT
Start: 2024-03-21

## 2024-03-21 NOTE — TELEPHONE ENCOUNTER
----- Message from Liudmila Leavitt sent at 3/21/2024  4:32 PM CDT -----  Type:  Needs Medical Advice    Who Called:  Caller BAL Andrea   Would the patient rather a call back or a response via [facility]? Callback   Best Call Back Number: [phone]   Additional Information: Caller is requesting a callback from this provider office

## 2024-03-27 ENCOUNTER — OFFICE VISIT (OUTPATIENT)
Dept: RHEUMATOLOGY | Facility: CLINIC | Age: 44
End: 2024-03-27
Payer: MEDICAID

## 2024-03-27 ENCOUNTER — PATIENT MESSAGE (OUTPATIENT)
Dept: RHEUMATOLOGY | Facility: CLINIC | Age: 44
End: 2024-03-27

## 2024-03-27 VITALS
HEIGHT: 64 IN | WEIGHT: 135.25 LBS | DIASTOLIC BLOOD PRESSURE: 77 MMHG | BODY MASS INDEX: 23.09 KG/M2 | HEART RATE: 74 BPM | SYSTOLIC BLOOD PRESSURE: 119 MMHG

## 2024-03-27 DIAGNOSIS — D68.61 ANTIPHOSPHOLIPID SYNDROME: Primary | ICD-10-CM

## 2024-03-27 PROCEDURE — 99213 OFFICE O/P EST LOW 20 MIN: CPT | Mod: PBBFAC,PN | Performed by: STUDENT IN AN ORGANIZED HEALTH CARE EDUCATION/TRAINING PROGRAM

## 2024-03-27 PROCEDURE — 99999 PR PBB SHADOW E&M-EST. PATIENT-LVL III: CPT | Mod: PBBFAC,,, | Performed by: STUDENT IN AN ORGANIZED HEALTH CARE EDUCATION/TRAINING PROGRAM

## 2024-03-27 PROCEDURE — 99204 OFFICE O/P NEW MOD 45 MIN: CPT | Mod: S$PBB,,, | Performed by: STUDENT IN AN ORGANIZED HEALTH CARE EDUCATION/TRAINING PROGRAM

## 2024-03-27 PROCEDURE — 3008F BODY MASS INDEX DOCD: CPT | Mod: CPTII,,, | Performed by: STUDENT IN AN ORGANIZED HEALTH CARE EDUCATION/TRAINING PROGRAM

## 2024-03-27 PROCEDURE — 3078F DIAST BP <80 MM HG: CPT | Mod: CPTII,,, | Performed by: STUDENT IN AN ORGANIZED HEALTH CARE EDUCATION/TRAINING PROGRAM

## 2024-03-27 PROCEDURE — 1159F MED LIST DOCD IN RCRD: CPT | Mod: CPTII,,, | Performed by: STUDENT IN AN ORGANIZED HEALTH CARE EDUCATION/TRAINING PROGRAM

## 2024-03-27 PROCEDURE — 3074F SYST BP LT 130 MM HG: CPT | Mod: CPTII,,, | Performed by: STUDENT IN AN ORGANIZED HEALTH CARE EDUCATION/TRAINING PROGRAM

## 2024-03-27 NOTE — PROGRESS NOTES
RHEUMATOLOGY OUTPATIENT CLINIC NOTE    3/27/2024    Attending Rheumatologist: Inga Schmitz  Primary Care Provider: Rm Simpson MD   Physician Requesting Consultation: Wing Watts MD  [address]  [address]  [place] [address]  Chief Complaint/Reason For Consultation:  Consult      Subjective:       HPI  Rhonda Ahuja is a 44 y.o. White female with no significant history who comes for evaluation of APLS.     She was in her usual state until September 2023, when she was found to have a large MCA stroke that required mechanical thrombectomy. At the time, she had elevated B2GP. She was on DAPT for 1 month and was supposed to be on aspirin afterwards but did not continue it. She does not have any residual deficits from stroke. 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   She is following with Dr. Watts (hem/onc) and with the warfarin clinic.     She denies any joint pain, joint swelling, prolonged morning stiffness, oral ulcers, persistent skin rashes, swollen glands.   Noted left 2nd and 3rd finger pale/red discoloration a couple of times, noted it a couple of days prior to the splenic infarction. No ulcerations, no gangrene. It has not recurred.     She denies any family history of autoimmune conditions.     Labs    1/2024  LAC negative  Anticardiolipin negative  Factor V leiden negative     CBC without cytopenias       Review of Systems   Constitutional:  Negative for fever and unexpected weight change.   HENT:  Negative for mouth sores and trouble swallowing.    Eyes:  Negative for redness.   Respiratory:  Negative for cough and shortness of breath.    Cardiovascular:  Negative for chest pain.   Gastrointestinal:  Negative for constipation and diarrhea.   Genitourinary:  Negative for dysuria and genital sores.   Integumentary:  Negative for rash.   Neurological:  Negative for headaches.    Hematological:  Does not bruise/bleed easily.        Chronic comorbid conditions affecting medical decision making today:  Past Medical History:   Diagnosis Date    HLD (hyperlipidemia) 2023    Stroke      Past Surgical History:   Procedure Laterality Date    BREAST BIOPSY Right     excisional benign    BUNIONECTOMY       SECTION      x 2    TONSILLECTOMY      TRANSESOPHAGEAL ECHOCARDIOGRAPHY N/A 2023    TRANSESOPHAGEAL ECHOCARDIOGRAPHY N/A 2023    Procedure: ECHOCARDIOGRAM, TRANSESOPHAGEAL;  Surgeon: Wing Yeung MD;  [facility] CATH LAB;  Service: Cardiology;  Laterality: N/A;     Family History   Problem Relation Age of Onset    Hypertension Mother     Diabetes Mother     ALS Mother 72    Heart attack Father     Coronary artery disease Father     No Known Problems Sister     No Known Problems Brother     Dementia Maternal Grandmother     Breast cancer Maternal Grandmother 80    Leukemia Maternal Grandfather         dx earlier then recurrence before death    Leukemia Other         childhood    Colon cancer Neg Hx     Ovarian cancer Neg Hx      Social History     Substance and Sexual Activity   Alcohol Use Not Currently     Social History     Tobacco Use   Smoking Status Never   Smokeless Tobacco Never     Social History     Substance and Sexual Activity   Drug Use Never       Current Outpatient Medications:     aspirin (ECOTRIN) 81 MG EC tablet, Take 1 tablet (81 mg total) by mouth once daily., Disp: 30 tablet, Rfl: 11    warfarin (COUMADIN) 5 MG tablet, Take 1.5 tablets (7.5 mg total) by mouth Daily., Disp: 45 tablet, Rfl: 11  No current facility-administered medications for this visit.    Facility-Administered Medications Ordered in Other Visits:     0.9%  NaCl infusion, , Intravenous, Continuous, Wing Yeung MD     Objective:         Vitals:    24 1113   BP: 119/77   Pulse: 74     Physical Exam  No tenderness or synovitis on hands, wrists, elbows, knees, ankles,  "feet.   Normal capillaroscopy  No oral ulcers  No skin rashes    Reviewed old and all outside pertinent medical records available.    All lab results personally reviewed and interpreted by me.  Lab Results   Component Value Date    WBC 6.56 01/17/2024    HGB 12.5 01/17/2024    HCT 35.9 (L) 01/17/2024    MCV 89 01/17/2024    MCH 31.1 (H) 01/17/2024    MCHC 34.8 01/17/2024    RDW 11.9 01/17/2024     01/17/2024    MPV 9.0 (L) 01/17/2024       Lab Results   Component Value Date     02/07/2024    K 4.6 02/07/2024     02/07/2024    CO2 24 02/07/2024    GLU 96 02/07/2024    BUN 14 02/07/2024    CALCIUM 10.0 02/07/2024    PROT 7.4 02/07/2024    ALBUMIN 4.0 02/07/2024    BILITOT 0.1 02/07/2024    AST 18 02/07/2024    ALKPHOS 49 (L) 02/07/2024    ALT 19 02/07/2024       Lab Results   Component Value Date    COLORU Yellow 01/10/2024    APPEARANCEUA Clear 01/10/2024    SPECGRAV 1.010 01/10/2024    PHUR 6.0 01/10/2024    PROTEINUA Negative 01/10/2024    KETONESU Negative 01/10/2024    LEUKOCYTESUR Negative 01/10/2024    NITRITE Negative 01/10/2024    UROBILINOGEN Negative 01/10/2024       Lab Results   Component Value Date    CRP 24.5 (H) 01/10/2024       Lab Results   Component Value Date    SEDRATE 30 01/10/2024       No components found for: "25OHVITDTOT", "46SULXNG3", "03DOMACN5", "METHODNOTE"    No results found for: "URICACID"    Lab Results   Component Value Date    HEPCAB Non-reactive 10/02/2023       Imaging:  All imaging reviewed and independently interpreted by me.         ASSESSMENT / PLAN:     Rhonda Ahuja is a 44 y.o. White female with:    1. Antiphospholipid syndrome  -I think that her symptoms are likely related to antiphospholipid syndrome. She has had 2 macrovascular arterial thrombosis (stroke and splenic infarct) with moderate titers of B2GP IgM only. Defer management to hematology  -She does not have any other symptoms suggestive of connective tissue disease but will obtain NIVIA now " to complete workup.     Follow up if symptoms worsen or fail to improve.    Method of contact with patient concerns: [facility]    Disclaimer:  This note is prepared using voice recognition software and as such is likely to have errors and has not been proof read. Please contact me for questions.     Time spent: 45 minutes in face to face discussion concerning diagnosis, prognosis, review of lab and test results, benefits of treatment as well as management of disease, counseling of patient and coordination of care between various health care providers.  Greater than half the time spent was used for coordination of care and counseling of patient.    Inga Schmitz M.D.  Rheumatology  [facility]

## 2024-03-27 NOTE — PROGRESS NOTES
3/27/2024    10:58 AM   Rapid3 Question Responses and Scores   MDHAQ Score 0   Psychologic Score 2.2   Pain Score 0   When you awakened in the morning OVER THE LAST WEEK, did you feel stiff? No   Fatigue Score 0   Global Health Score 10   RAPID3 Score 3.33

## 2024-04-01 PROBLEM — I63.9 EMBOLIC STROKE: Status: RESOLVED | Noted: 2023-12-26 | Resolved: 2024-04-01

## 2024-04-03 ENCOUNTER — ANTI-COAG VISIT (OUTPATIENT)
Dept: CARDIOLOGY | Facility: CLINIC | Age: 44
End: 2024-04-03
Payer: MEDICAID

## 2024-04-03 ENCOUNTER — LAB VISIT (OUTPATIENT)
Dept: LAB | Facility: HOSPITAL | Age: 44
End: 2024-04-03
Attending: INTERNAL MEDICINE
Payer: MEDICAID

## 2024-04-03 DIAGNOSIS — D73.5 SPLENIC INFARCT: Primary | ICD-10-CM

## 2024-04-03 DIAGNOSIS — Z79.01 LONG TERM (CURRENT) USE OF ANTICOAGULANTS: ICD-10-CM

## 2024-04-03 DIAGNOSIS — D68.61 ANTIPHOSPHOLIPID SYNDROME: ICD-10-CM

## 2024-04-03 DIAGNOSIS — D73.5 SPLENIC INFARCT: ICD-10-CM

## 2024-04-03 LAB
INR PPP: 1.3 (ref 0.8–1.2)
PROTHROMBIN TIME: 14 SEC (ref 9–12.5)

## 2024-04-03 PROCEDURE — 36415 COLL VENOUS BLD VENIPUNCTURE: CPT | Performed by: INTERNAL MEDICINE

## 2024-04-03 PROCEDURE — 85610 PROTHROMBIN TIME: CPT | Performed by: INTERNAL MEDICINE

## 2024-04-03 PROCEDURE — 93793 ANTICOAG MGMT PT WARFARIN: CPT | Mod: ,,,

## 2024-04-03 NOTE — PROGRESS NOTES
INR low. Pt missed a dose. Bolus dose today then resume current dose. Recheck INR in 1 week    Update 4/5: Pt did not bolus as planned. Plan was for 12.5mg 4/3. Change plan to 10mg tonight. R/s INR to Monday

## 2024-04-05 NOTE — PROGRESS NOTES
4/05/24 Patient called and reports she took 7.5mg Wednesday and yesterday Thursday, did not get message to take 12.5mg

## 2024-04-08 ENCOUNTER — OFFICE VISIT (OUTPATIENT)
Dept: OBSTETRICS AND GYNECOLOGY | Facility: CLINIC | Age: 44
End: 2024-04-08
Payer: MEDICAID

## 2024-04-08 ENCOUNTER — ANTI-COAG VISIT (OUTPATIENT)
Dept: CARDIOLOGY | Facility: CLINIC | Age: 44
End: 2024-04-08
Payer: MEDICAID

## 2024-04-08 ENCOUNTER — LAB VISIT (OUTPATIENT)
Dept: LAB | Facility: HOSPITAL | Age: 44
End: 2024-04-08
Attending: INTERNAL MEDICINE
Payer: MEDICAID

## 2024-04-08 VITALS
WEIGHT: 132.69 LBS | BODY MASS INDEX: 22.65 KG/M2 | SYSTOLIC BLOOD PRESSURE: 98 MMHG | HEIGHT: 64 IN | DIASTOLIC BLOOD PRESSURE: 70 MMHG

## 2024-04-08 DIAGNOSIS — D68.61 ANTIPHOSPHOLIPID SYNDROME: ICD-10-CM

## 2024-04-08 DIAGNOSIS — Z79.01 LONG TERM (CURRENT) USE OF ANTICOAGULANTS: ICD-10-CM

## 2024-04-08 DIAGNOSIS — Z11.51 SCREENING FOR HPV (HUMAN PAPILLOMAVIRUS): ICD-10-CM

## 2024-04-08 DIAGNOSIS — Z01.419 ENCOUNTER FOR GYNECOLOGICAL EXAMINATION: Primary | ICD-10-CM

## 2024-04-08 DIAGNOSIS — D73.5 SPLENIC INFARCT: ICD-10-CM

## 2024-04-08 DIAGNOSIS — Z12.4 SCREENING FOR CERVICAL CANCER: ICD-10-CM

## 2024-04-08 DIAGNOSIS — D73.5 SPLENIC INFARCT: Primary | ICD-10-CM

## 2024-04-08 LAB
INR PPP: 2.6 (ref 0.8–1.2)
PROTHROMBIN TIME: 26.4 SEC (ref 9–12.5)

## 2024-04-08 PROCEDURE — 99213 OFFICE O/P EST LOW 20 MIN: CPT | Mod: PBBFAC | Performed by: OBSTETRICS & GYNECOLOGY

## 2024-04-08 PROCEDURE — 93793 ANTICOAG MGMT PT WARFARIN: CPT | Mod: ,,,

## 2024-04-08 PROCEDURE — 99459 PELVIC EXAMINATION: CPT | Mod: PBBFAC | Performed by: OBSTETRICS & GYNECOLOGY

## 2024-04-08 PROCEDURE — 88175 CYTOPATH C/V AUTO FLUID REDO: CPT | Performed by: OBSTETRICS & GYNECOLOGY

## 2024-04-08 PROCEDURE — 36415 COLL VENOUS BLD VENIPUNCTURE: CPT | Performed by: INTERNAL MEDICINE

## 2024-04-08 PROCEDURE — 3078F DIAST BP <80 MM HG: CPT | Mod: CPTII,,, | Performed by: OBSTETRICS & GYNECOLOGY

## 2024-04-08 PROCEDURE — 3074F SYST BP LT 130 MM HG: CPT | Mod: CPTII,,, | Performed by: OBSTETRICS & GYNECOLOGY

## 2024-04-08 PROCEDURE — 3008F BODY MASS INDEX DOCD: CPT | Mod: CPTII,,, | Performed by: OBSTETRICS & GYNECOLOGY

## 2024-04-08 PROCEDURE — 87624 HPV HI-RISK TYP POOLED RSLT: CPT | Performed by: OBSTETRICS & GYNECOLOGY

## 2024-04-08 PROCEDURE — 99386 PREV VISIT NEW AGE 40-64: CPT | Mod: S$PBB,,, | Performed by: OBSTETRICS & GYNECOLOGY

## 2024-04-08 PROCEDURE — 85610 PROTHROMBIN TIME: CPT | Performed by: INTERNAL MEDICINE

## 2024-04-08 PROCEDURE — 99459 PELVIC EXAMINATION: CPT | Mod: S$PBB,,, | Performed by: OBSTETRICS & GYNECOLOGY

## 2024-04-08 PROCEDURE — 1160F RVW MEDS BY RX/DR IN RCRD: CPT | Mod: CPTII,,, | Performed by: OBSTETRICS & GYNECOLOGY

## 2024-04-08 PROCEDURE — 1159F MED LIST DOCD IN RCRD: CPT | Mod: CPTII,,, | Performed by: OBSTETRICS & GYNECOLOGY

## 2024-04-08 PROCEDURE — 99999 PR PBB SHADOW E&M-EST. PATIENT-LVL III: CPT | Mod: PBBFAC,,, | Performed by: OBSTETRICS & GYNECOLOGY

## 2024-04-08 NOTE — PROGRESS NOTES
Subjective:       Patient ID: Rhonda Ahuja is a 44 y.o. female.    Chief Complaint:  Annual Exam (Last pap/hpv: 2020 Normal; Last mm2024 birads: 1)      History of Present Illness  - here for annual. Regular periods. Feels good.  - has had episodes of RLQ severe cramping in the past year. None since December. BMs are now regular but they weren't before.    Past Medical History:   Diagnosis Date    HLD (hyperlipidemia) 2023    Stroke        Past Surgical History:   Procedure Laterality Date    BREAST BIOPSY Right     excisional benign    BUNIONECTOMY       SECTION      x 2    TONSILLECTOMY      TRANSESOPHAGEAL ECHOCARDIOGRAPHY N/A 2023    TRANSESOPHAGEAL ECHOCARDIOGRAPHY N/A 2023    Procedure: ECHOCARDIOGRAM, TRANSESOPHAGEAL;  Surgeon: Wing Yeung MD;  Location: [facility] CATH LAB;  Service: Cardiology;  Laterality: N/A;        Family History   Problem Relation Age of Onset    Hypertension Mother     Diabetes Mother     ALS Mother 72    Heart attack Father     Coronary artery disease Father     No Known Problems Sister     No Known Problems Brother     Dementia Maternal Grandmother     Breast cancer Maternal Grandmother 80    Leukemia Maternal Grandfather         dx earlier then recurrence before death    Leukemia Other         childhood    Colon cancer Neg Hx     Ovarian cancer Neg Hx         Social History     Socioeconomic History    Marital status: Single   Tobacco Use    Smoking status: Never    Smokeless tobacco: Never   Substance and Sexual Activity    Alcohol use: Not Currently    Drug use: Never    Sexual activity: Yes     Partners: Male     Birth control/protection: Coitus interruptus     Social Determinants of Health     Financial Resource Strain: Patient Declined (2024)    Overall Financial Resource Strain (CARDIA)     Difficulty of Paying Living Expenses: Patient declined   Food Insecurity: Patient Declined (2024)    Hunger Vital Sign     Worried About  "Running Out of Food in the Last Year: Patient declined     Ran Out of Food in the Last Year: Patient declined   Transportation Needs: Patient Declined (1/12/2024)    PRAPARE - Transportation     Lack of Transportation (Medical): Patient declined     Lack of Transportation (Non-Medical): Patient declined   Physical Activity: Sufficiently Active (1/12/2024)    Exercise Vital Sign     Days of Exercise per Week: 7 days     Minutes of Exercise per Session: 50 min   Stress: Patient Declined (1/12/2024)    [employer] - Occupational Stress Questionnaire     Feeling of Stress : Patient declined   Social Connections: Unknown (1/12/2024)    Social Connection and Isolation Panel [NHANES]     Frequency of Communication with Friends and Family: Patient declined     Frequency of Social Gatherings with Friends and Family: Patient declined     Active Member of Clubs or Organizations: Patient declined     Attends Club or Organization Meetings: Patient declined     Marital Status: Patient declined   Housing Stability: Patient Declined (1/12/2024)    Housing Stability Vital Sign     Unable to Pay for Housing in the Last Year: Patient declined     Unstable Housing in the Last Year: Patient declined           Objective:     Vitals:    04/08/24 0847   BP: 98/70   Weight: 60.2 kg (132 lb 11.5 oz)   Height: 5' 4" (1.626 m)       Physical Exam:   Constitutional: She is oriented to person, place, and time. She appears well-developed and well-nourished.        Pulmonary/Chest: Right breast exhibits no mass, no nipple discharge, no skin change, no tenderness and no swelling. Left breast exhibits no mass, no nipple discharge, no skin change, no tenderness and no swelling. Breasts are symmetrical.        Abdominal: Soft. She exhibits no distension. There is no abdominal tenderness.     Genitourinary:    Vagina and uterus normal.   There is no tenderness or lesion on the right labia. There is no tenderness or lesion " on the left labia. Cervix is normal. Right adnexum displays no mass, no tenderness and no fullness. Left adnexum displays no mass, no tenderness and no fullness. No vaginal discharge in the vagina.    pap smear completed          Musculoskeletal: Moves all extremeties.       Neurological: She is alert and oriented to person, place, and time.     Psychiatric: She has a normal mood and affect.        A female chaperone was present for exam.    Assessment/ Plan:     Orders Placed This Encounter    HPV High Risk Genotypes, PCR    Liquid-Based Pap Smear, Screening       Rhonda was seen today for annual exam.    Diagnoses and all orders for this visit:    Encounter for gynecological examination    Screening for cervical cancer  -     Liquid-Based Pap Smear, Screening    Screening for HPV (human papillomavirus)  -     HPV High Risk Genotypes, PCR    - RLQ pain was likely due to bowel issues. She'll keep me posted. If recurs, consider pelvic u/s.  - patient verbalized understanding and agrees with plan.      Follow up in about 1 year (around 4/8/2025) for annual exam.    As of April 1, 2021, the Cures Act has been passed nationally. This new law requires that all doctors progress notes, lab results, pathology reports and radiology reports be released IMMEDIATELY to the patient in the patient portal. That means that the results are released to you at the EXACT same time they are released to me. Therefore, with all of the patients that I have I am not able to reply to each patient exactly when the results come in. So there will be a delay from when you see the results to when I see them and have time to come up with a response to send you. Also I only see these results when I am on the computer at work. So if the results come in over the weekend or after 5 pm of a work day, I will not see them until the next business day. As you can tell, this is a challenge as a physician to give every patient the quick response they hope for  and deserve. So please be patient!   Thanks for your understanding and patience.

## 2024-04-12 LAB
FINAL PATHOLOGIC DIAGNOSIS: NORMAL
HPV HR 12 DNA SPEC QL NAA+PROBE: NEGATIVE
HPV16 AG SPEC QL: NEGATIVE
HPV18 DNA SPEC QL NAA+PROBE: NEGATIVE
Lab: NORMAL

## 2024-04-22 ENCOUNTER — ANTI-COAG VISIT (OUTPATIENT)
Dept: CARDIOLOGY | Facility: CLINIC | Age: 44
End: 2024-04-22
Payer: MEDICAID

## 2024-04-22 ENCOUNTER — LAB VISIT (OUTPATIENT)
Dept: LAB | Facility: HOSPITAL | Age: 44
End: 2024-04-22
Attending: INTERNAL MEDICINE
Payer: MEDICAID

## 2024-04-22 DIAGNOSIS — D68.61 ANTIPHOSPHOLIPID SYNDROME: ICD-10-CM

## 2024-04-22 DIAGNOSIS — D73.5 SPLENIC INFARCT: ICD-10-CM

## 2024-04-22 DIAGNOSIS — Z79.01 LONG TERM (CURRENT) USE OF ANTICOAGULANTS: ICD-10-CM

## 2024-04-22 DIAGNOSIS — D73.5 SPLENIC INFARCT: Primary | ICD-10-CM

## 2024-04-22 LAB
INR PPP: 2.3 (ref 0.8–1.2)
PROTHROMBIN TIME: 24.3 SEC (ref 9–12.5)

## 2024-04-22 PROCEDURE — 36415 COLL VENOUS BLD VENIPUNCTURE: CPT | Performed by: INTERNAL MEDICINE

## 2024-04-22 PROCEDURE — 93793 ANTICOAG MGMT PT WARFARIN: CPT | Mod: ,,,

## 2024-04-22 PROCEDURE — 85610 PROTHROMBIN TIME: CPT | Performed by: INTERNAL MEDICINE

## 2024-05-23 ENCOUNTER — LAB VISIT (OUTPATIENT)
Dept: LAB | Facility: HOSPITAL | Age: 44
End: 2024-05-23
Attending: INTERNAL MEDICINE
Payer: MEDICAID

## 2024-05-23 ENCOUNTER — ANTI-COAG VISIT (OUTPATIENT)
Dept: CARDIOLOGY | Facility: CLINIC | Age: 44
End: 2024-05-23
Payer: MEDICAID

## 2024-05-23 DIAGNOSIS — D73.5 SPLENIC INFARCT: ICD-10-CM

## 2024-05-23 DIAGNOSIS — D68.61 ANTIPHOSPHOLIPID SYNDROME: ICD-10-CM

## 2024-05-23 DIAGNOSIS — Z79.01 LONG TERM (CURRENT) USE OF ANTICOAGULANTS: ICD-10-CM

## 2024-05-23 DIAGNOSIS — D73.5 SPLENIC INFARCT: Primary | ICD-10-CM

## 2024-05-23 LAB
INR PPP: 1.9 (ref 0.8–1.2)
PROTHROMBIN TIME: 19.6 SEC (ref 9–12.5)

## 2024-05-23 PROCEDURE — 85610 PROTHROMBIN TIME: CPT | Performed by: INTERNAL MEDICINE

## 2024-05-23 PROCEDURE — 93793 ANTICOAG MGMT PT WARFARIN: CPT | Mod: ,,,

## 2024-05-23 PROCEDURE — 36415 COLL VENOUS BLD VENIPUNCTURE: CPT | Performed by: INTERNAL MEDICINE

## 2024-06-06 ENCOUNTER — LAB VISIT (OUTPATIENT)
Dept: LAB | Facility: HOSPITAL | Age: 44
End: 2024-06-06
Attending: INTERNAL MEDICINE
Payer: MEDICAID

## 2024-06-06 DIAGNOSIS — Z79.01 LONG TERM (CURRENT) USE OF ANTICOAGULANTS: ICD-10-CM

## 2024-06-06 DIAGNOSIS — D68.61 ANTIPHOSPHOLIPID SYNDROME: ICD-10-CM

## 2024-06-06 DIAGNOSIS — D73.5 SPLENIC INFARCT: ICD-10-CM

## 2024-06-06 LAB
INR PPP: 2.5 (ref 0.8–1.2)
PROTHROMBIN TIME: 25.7 SEC (ref 9–12.5)

## 2024-06-06 PROCEDURE — 36415 COLL VENOUS BLD VENIPUNCTURE: CPT | Performed by: INTERNAL MEDICINE

## 2024-06-06 PROCEDURE — 85610 PROTHROMBIN TIME: CPT | Performed by: INTERNAL MEDICINE

## 2024-06-07 ENCOUNTER — ANTI-COAG VISIT (OUTPATIENT)
Dept: CARDIOLOGY | Facility: CLINIC | Age: 44
End: 2024-06-07
Payer: MEDICAID

## 2024-06-07 DIAGNOSIS — D68.61 ANTIPHOSPHOLIPID SYNDROME: ICD-10-CM

## 2024-06-07 DIAGNOSIS — D73.5 SPLENIC INFARCT: Primary | ICD-10-CM

## 2024-06-07 DIAGNOSIS — Z79.01 LONG TERM (CURRENT) USE OF ANTICOAGULANTS: ICD-10-CM

## 2024-06-07 PROCEDURE — 93793 ANTICOAG MGMT PT WARFARIN: CPT | Mod: ,,,

## 2024-06-27 ENCOUNTER — LAB VISIT (OUTPATIENT)
Dept: LAB | Facility: HOSPITAL | Age: 44
End: 2024-06-27
Attending: INTERNAL MEDICINE
Payer: MEDICAID

## 2024-06-27 ENCOUNTER — ANTI-COAG VISIT (OUTPATIENT)
Dept: CARDIOLOGY | Facility: CLINIC | Age: 44
End: 2024-06-27
Payer: MEDICAID

## 2024-06-27 DIAGNOSIS — Z79.01 LONG TERM (CURRENT) USE OF ANTICOAGULANTS: ICD-10-CM

## 2024-06-27 DIAGNOSIS — D73.5 SPLENIC INFARCT: Primary | ICD-10-CM

## 2024-06-27 DIAGNOSIS — D68.61 ANTIPHOSPHOLIPID SYNDROME: ICD-10-CM

## 2024-06-27 DIAGNOSIS — D73.5 SPLENIC INFARCT: ICD-10-CM

## 2024-06-27 LAB
INR PPP: 3.4 (ref 0.8–1.2)
PROTHROMBIN TIME: 34.9 SEC (ref 9–12.5)

## 2024-06-27 PROCEDURE — 85610 PROTHROMBIN TIME: CPT | Performed by: INTERNAL MEDICINE

## 2024-06-27 PROCEDURE — 93793 ANTICOAG MGMT PT WARFARIN: CPT | Mod: ,,,

## 2024-06-27 PROCEDURE — 36415 COLL VENOUS BLD VENIPUNCTURE: CPT | Performed by: INTERNAL MEDICINE

## 2024-07-09 ENCOUNTER — ANTI-COAG VISIT (OUTPATIENT)
Dept: CARDIOLOGY | Facility: CLINIC | Age: 44
End: 2024-07-09
Payer: MEDICAID

## 2024-07-09 ENCOUNTER — LAB VISIT (OUTPATIENT)
Dept: LAB | Facility: HOSPITAL | Age: 44
End: 2024-07-09
Attending: INTERNAL MEDICINE
Payer: MEDICAID

## 2024-07-09 DIAGNOSIS — D68.61 ANTIPHOSPHOLIPID SYNDROME: ICD-10-CM

## 2024-07-09 DIAGNOSIS — D73.5 SPLENIC INFARCT: Primary | ICD-10-CM

## 2024-07-09 DIAGNOSIS — D73.5 SPLENIC INFARCT: ICD-10-CM

## 2024-07-09 DIAGNOSIS — Z79.01 LONG TERM (CURRENT) USE OF ANTICOAGULANTS: ICD-10-CM

## 2024-07-09 LAB
INR PPP: 2.6 (ref 0.8–1.2)
PROTHROMBIN TIME: 27.1 SEC (ref 9–12.5)

## 2024-07-09 PROCEDURE — 85610 PROTHROMBIN TIME: CPT | Performed by: INTERNAL MEDICINE

## 2024-07-09 PROCEDURE — 36415 COLL VENOUS BLD VENIPUNCTURE: CPT | Performed by: INTERNAL MEDICINE

## 2024-07-09 PROCEDURE — 93793 ANTICOAG MGMT PT WARFARIN: CPT | Mod: ,,,

## 2024-08-29 ENCOUNTER — PATIENT MESSAGE (OUTPATIENT)
Dept: RHEUMATOLOGY | Facility: CLINIC | Age: 44
End: 2024-08-29
Payer: MEDICAID

## 2024-09-18 ENCOUNTER — ANTI-COAG VISIT (OUTPATIENT)
Dept: CARDIOLOGY | Facility: CLINIC | Age: 44
End: 2024-09-18
Payer: MEDICAID

## 2024-09-18 ENCOUNTER — LAB VISIT (OUTPATIENT)
Dept: LAB | Facility: HOSPITAL | Age: 44
End: 2024-09-18
Attending: INTERNAL MEDICINE
Payer: MEDICAID

## 2024-09-18 DIAGNOSIS — D73.5 SPLENIC INFARCT: Primary | ICD-10-CM

## 2024-09-18 DIAGNOSIS — D68.61 ANTIPHOSPHOLIPID SYNDROME: ICD-10-CM

## 2024-09-18 DIAGNOSIS — D73.5 SPLENIC INFARCT: ICD-10-CM

## 2024-09-18 DIAGNOSIS — Z79.01 LONG TERM (CURRENT) USE OF ANTICOAGULANTS: ICD-10-CM

## 2024-09-18 LAB
INR PPP: 4.4 (ref 0.8–1.2)
PROTHROMBIN TIME: 43.6 SEC (ref 9–12.5)

## 2024-09-18 PROCEDURE — 36415 COLL VENOUS BLD VENIPUNCTURE: CPT | Performed by: INTERNAL MEDICINE

## 2024-09-18 PROCEDURE — 85610 PROTHROMBIN TIME: CPT | Performed by: INTERNAL MEDICINE

## 2024-09-18 PROCEDURE — 93793 ANTICOAG MGMT PT WARFARIN: CPT | Mod: ,,,

## 2024-09-18 NOTE — PROGRESS NOTES
INR high. Pt reports she is on her menstrual cycle currently. No other changes. Hold dose today then resume maintenance dose. Recheck INR in 1 week

## 2024-09-25 ENCOUNTER — LAB VISIT (OUTPATIENT)
Dept: LAB | Facility: HOSPITAL | Age: 44
End: 2024-09-25
Attending: INTERNAL MEDICINE
Payer: MEDICAID

## 2024-09-25 ENCOUNTER — ANTI-COAG VISIT (OUTPATIENT)
Dept: CARDIOLOGY | Facility: CLINIC | Age: 44
End: 2024-09-25
Payer: MEDICAID

## 2024-09-25 DIAGNOSIS — D68.61 ANTIPHOSPHOLIPID SYNDROME: ICD-10-CM

## 2024-09-25 DIAGNOSIS — D73.5 SPLENIC INFARCT: ICD-10-CM

## 2024-09-25 DIAGNOSIS — D73.5 SPLENIC INFARCT: Primary | ICD-10-CM

## 2024-09-25 DIAGNOSIS — Z79.01 LONG TERM (CURRENT) USE OF ANTICOAGULANTS: ICD-10-CM

## 2024-09-25 LAB
INR PPP: 2.3 (ref 0.8–1.2)
PROTHROMBIN TIME: 23.5 SEC (ref 9–12.5)

## 2024-09-25 PROCEDURE — 36415 COLL VENOUS BLD VENIPUNCTURE: CPT | Performed by: INTERNAL MEDICINE

## 2024-09-25 PROCEDURE — 93793 ANTICOAG MGMT PT WARFARIN: CPT | Mod: ,,,

## 2024-09-25 PROCEDURE — 85610 PROTHROMBIN TIME: CPT | Performed by: INTERNAL MEDICINE

## 2024-09-26 NOTE — ADDENDUM NOTE
Addended by: ERICK CHING on: 9/26/2024 03:54 PM     Modules accepted: Orders     Chart review done.

## 2024-10-09 ENCOUNTER — LAB VISIT (OUTPATIENT)
Dept: LAB | Facility: HOSPITAL | Age: 44
End: 2024-10-09
Attending: INTERNAL MEDICINE
Payer: MEDICAID

## 2024-10-09 ENCOUNTER — ANTI-COAG VISIT (OUTPATIENT)
Dept: CARDIOLOGY | Facility: CLINIC | Age: 44
End: 2024-10-09
Payer: MEDICAID

## 2024-10-09 DIAGNOSIS — D73.5 SPLENIC INFARCT: Primary | ICD-10-CM

## 2024-10-09 DIAGNOSIS — Z79.01 LONG TERM (CURRENT) USE OF ANTICOAGULANTS: ICD-10-CM

## 2024-10-09 DIAGNOSIS — D68.61 ANTIPHOSPHOLIPID SYNDROME: ICD-10-CM

## 2024-10-09 DIAGNOSIS — D73.5 SPLENIC INFARCT: ICD-10-CM

## 2024-10-09 LAB
INR PPP: 2 (ref 0.8–1.2)
PROTHROMBIN TIME: 20.6 SEC (ref 9–12.5)

## 2024-10-09 PROCEDURE — 85610 PROTHROMBIN TIME: CPT | Performed by: INTERNAL MEDICINE

## 2024-10-09 PROCEDURE — 36415 COLL VENOUS BLD VENIPUNCTURE: CPT | Performed by: INTERNAL MEDICINE

## 2024-10-09 PROCEDURE — 93793 ANTICOAG MGMT PT WARFARIN: CPT | Mod: ,,,

## 2024-10-09 NOTE — PROGRESS NOTES
INR on lower end but good. Continue dose. Advise no greens today then resume normal diet of greens daily. Recheck INR in 3 weeks

## 2024-10-31 ENCOUNTER — ANTI-COAG VISIT (OUTPATIENT)
Dept: CARDIOLOGY | Facility: CLINIC | Age: 44
End: 2024-10-31
Payer: MEDICAID

## 2024-10-31 ENCOUNTER — LAB VISIT (OUTPATIENT)
Dept: LAB | Facility: HOSPITAL | Age: 44
End: 2024-10-31
Attending: INTERNAL MEDICINE
Payer: MEDICAID

## 2024-10-31 DIAGNOSIS — Z79.01 LONG TERM (CURRENT) USE OF ANTICOAGULANTS: ICD-10-CM

## 2024-10-31 DIAGNOSIS — D68.61 ANTIPHOSPHOLIPID SYNDROME: ICD-10-CM

## 2024-10-31 DIAGNOSIS — D73.5 SPLENIC INFARCT: Primary | ICD-10-CM

## 2024-10-31 DIAGNOSIS — D73.5 SPLENIC INFARCT: ICD-10-CM

## 2024-10-31 LAB
INR PPP: 2.2 (ref 0.8–1.2)
PROTHROMBIN TIME: 23.1 SEC (ref 9–12.5)

## 2024-10-31 PROCEDURE — 93793 ANTICOAG MGMT PT WARFARIN: CPT | Mod: ,,,

## 2024-10-31 PROCEDURE — 36415 COLL VENOUS BLD VENIPUNCTURE: CPT | Performed by: INTERNAL MEDICINE

## 2024-10-31 PROCEDURE — 85610 PROTHROMBIN TIME: CPT | Performed by: INTERNAL MEDICINE

## 2025-02-04 ENCOUNTER — ANTI-COAG VISIT (OUTPATIENT)
Dept: CARDIOLOGY | Facility: CLINIC | Age: 45
End: 2025-02-04
Payer: MEDICAID

## 2025-02-04 ENCOUNTER — LAB VISIT (OUTPATIENT)
Dept: LAB | Facility: HOSPITAL | Age: 45
End: 2025-02-04
Attending: INTERNAL MEDICINE
Payer: MEDICAID

## 2025-02-04 DIAGNOSIS — D73.5 SPLENIC INFARCT: Primary | ICD-10-CM

## 2025-02-04 DIAGNOSIS — Z79.01 LONG TERM (CURRENT) USE OF ANTICOAGULANTS: ICD-10-CM

## 2025-02-04 DIAGNOSIS — D68.61 ANTIPHOSPHOLIPID SYNDROME: ICD-10-CM

## 2025-02-04 DIAGNOSIS — D73.5 SPLENIC INFARCT: ICD-10-CM

## 2025-02-04 LAB
INR PPP: 2.4 (ref 0.8–1.2)
PROTHROMBIN TIME: 24.5 SEC (ref 9–12.5)

## 2025-02-04 PROCEDURE — 36415 COLL VENOUS BLD VENIPUNCTURE: CPT | Performed by: INTERNAL MEDICINE

## 2025-02-04 PROCEDURE — 85610 PROTHROMBIN TIME: CPT | Performed by: INTERNAL MEDICINE

## 2025-02-04 PROCEDURE — 93793 ANTICOAG MGMT PT WARFARIN: CPT | Mod: ,,,

## 2025-02-04 NOTE — PROGRESS NOTES
INR at goal. Medications and chart reviewed. No changes noted to necessitate adjustment of warfarin or follow-up plan. See calendar.    Noted pt has been noncompliant with appropriate follow up. INR was due 12/5. Remind pt to keep INRs as scheduled for her safety.

## 2025-02-13 ENCOUNTER — HOSPITAL ENCOUNTER (OUTPATIENT)
Dept: RADIOLOGY | Facility: HOSPITAL | Age: 45
Discharge: HOME | End: 2025-02-13
Attending: STUDENT IN AN ORGANIZED HEALTH CARE EDUCATION/TRAINING PROGRAM
Payer: MEDICAID

## 2025-02-13 VITALS — HEIGHT: 64 IN | BODY MASS INDEX: 22.53 KG/M2 | WEIGHT: 132 LBS

## 2025-02-13 DIAGNOSIS — Z12.31 ENCOUNTER FOR SCREENING MAMMOGRAM FOR BREAST CANCER: ICD-10-CM

## 2025-02-13 PROCEDURE — 77063 BREAST TOMOSYNTHESIS BI: CPT | Mod: TC

## 2025-02-13 PROCEDURE — 77063 BREAST TOMOSYNTHESIS BI: CPT | Mod: 26,,, | Performed by: RADIOLOGY

## 2025-02-13 PROCEDURE — 77067 SCR MAMMO BI INCL CAD: CPT | Mod: 26,,, | Performed by: RADIOLOGY

## 2025-03-18 ENCOUNTER — LAB VISIT (OUTPATIENT)
Dept: LAB | Facility: HOSPITAL | Age: 45
End: 2025-03-18
Attending: INTERNAL MEDICINE
Payer: MEDICAID

## 2025-03-18 ENCOUNTER — ANTI-COAG VISIT (OUTPATIENT)
Dept: CARDIOLOGY | Facility: CLINIC | Age: 45
End: 2025-03-18
Payer: MEDICAID

## 2025-03-18 DIAGNOSIS — D73.5 SPLENIC INFARCT: ICD-10-CM

## 2025-03-18 DIAGNOSIS — D68.61 ANTIPHOSPHOLIPID SYNDROME: ICD-10-CM

## 2025-03-18 DIAGNOSIS — Z79.01 LONG TERM (CURRENT) USE OF ANTICOAGULANTS: ICD-10-CM

## 2025-03-18 DIAGNOSIS — D73.5 SPLENIC INFARCT: Primary | ICD-10-CM

## 2025-03-18 LAB
INR PPP: 2.4 (ref 0.8–1.2)
PROTHROMBIN TIME: 24.5 SEC (ref 9–12.5)

## 2025-03-18 PROCEDURE — 93793 ANTICOAG MGMT PT WARFARIN: CPT | Mod: ,,,

## 2025-03-18 PROCEDURE — 85610 PROTHROMBIN TIME: CPT | Performed by: INTERNAL MEDICINE

## 2025-03-18 PROCEDURE — 36415 COLL VENOUS BLD VENIPUNCTURE: CPT | Performed by: INTERNAL MEDICINE

## 2025-03-19 ENCOUNTER — OFFICE VISIT (OUTPATIENT)
Dept: FAMILY MEDICINE | Facility: HOSPITAL | Age: 45
End: 2025-03-19
Payer: MEDICAID

## 2025-03-19 VITALS
DIASTOLIC BLOOD PRESSURE: 78 MMHG | HEART RATE: 101 BPM | SYSTOLIC BLOOD PRESSURE: 125 MMHG | BODY MASS INDEX: 19.31 KG/M2 | HEIGHT: 64 IN | WEIGHT: 113.13 LBS

## 2025-03-19 DIAGNOSIS — R19.4 BOWEL HABIT CHANGES: Primary | ICD-10-CM

## 2025-03-19 DIAGNOSIS — E78.5 HYPERLIPIDEMIA, UNSPECIFIED HYPERLIPIDEMIA TYPE: ICD-10-CM

## 2025-03-19 PROCEDURE — 99213 OFFICE O/P EST LOW 20 MIN: CPT

## 2025-03-19 NOTE — PROGRESS NOTES
[facility]    Subjective:     Rhonda Ahuja is a 45 y.o. year old female with PMHx of Antiphospholipid syndrome, macrovascular arterial thrombosis (stroke and splenic infarct 2023) on Coumadin and ASA 81 mg followed by Heme Onc Dr. Watts who presents to clinic for annual exam.    Bowel changes x 1 month. States used to have regular BMs. However, she has constipation for 2 days and abdominal cramping pain in her RLQ followed by loose stools for about 2 days. 7 on Ash Stool (watery, no solid pieces). States has affected her daily life to where she does not go work out because of it. Denies any fever/chills, nausea, vomiting, dysphagia, hematemesis, decreased appetite, melena or hematochezia. Denies any family history of colon cancer or IBD. Has never had a colonoscopy before. Has never seen GI before. Denies any night sweats or unintentional weight loss, states usually around ~115lb. She states the diarrhea does wake her up from sleep in the middle of the night. Denies any recent travel.    Does not want to be on a cholesterol medicine - states wanted to try dietary changes.    Pap smear 2024, due   Mammogram 2025, due 2026    Patient Active Problem List    Diagnosis Date Noted    Long term (current) use of anticoagulants 2024    Prolonged Q-T interval on ECG 01/15/2024    Left sided abdominal pain 2024    Antiphospholipid syndrome 2024    Splenic infarct 01/10/2024    Unspecified visual disturbance 2023    History of cerebrovascular accident (CVA) due to embolism 2023    Word finding difficulty 2023    HLD (hyperlipidemia) 2023        Review of patient's allergies indicates:  No Known Allergies     Past Medical History:   Diagnosis Date    HLD (hyperlipidemia) 2023    Stroke       Past Surgical History:   Procedure Laterality Date    BREAST BIOPSY Right     excisional benign    BUNIONECTOMY       SECTION      x 2     "TONSILLECTOMY      TRANSESOPHAGEAL ECHOCARDIOGRAPHY N/A 12/26/2023    TRANSESOPHAGEAL ECHOCARDIOGRAPHY N/A 12/26/2023    Procedure: ECHOCARDIOGRAM, TRANSESOPHAGEAL;  Surgeon: Wing Yeung MD;  Location: [facility] CATH LAB;  Service: Cardiology;  Laterality: N/A;      Family History   Problem Relation Name Age of Onset    Hypertension Mother      Diabetes Mother      ALS Mother  72    Heart attack Father      Coronary artery disease Father      No Known Problems Sister      No Known Problems Brother      Dementia Maternal Grandmother      Breast cancer Maternal Grandmother  80    Leukemia Maternal Grandfather          dx earlier then recurrence before death    Leukemia Other          childhood    Colon cancer Neg Hx      Ovarian cancer Neg Hx        Social History     Tobacco Use    Smoking status: Never    Smokeless tobacco: Never   Substance Use Topics    Alcohol use: Not Currently        Objective:     Vitals:    03/19/25 1341   BP: 125/78   BP Location: Right arm   Patient Position: Sitting   Pulse: 101   Weight: 51.3 kg (113 lb 1.5 oz)   Height: 5' 4" (1.626 m)     Body mass index is 19.41 kg/m².    Physical Exam  Vitals reviewed.   Constitutional:       General: She is not in acute distress.     Appearance: She is not toxic-appearing.   Eyes:      Extraocular Movements: Extraocular movements intact.   Cardiovascular:      Rate and Rhythm: Normal rate and regular rhythm.   Pulmonary:      Effort: Pulmonary effort is normal. No respiratory distress.      Breath sounds: Normal breath sounds.   Abdominal:      General: Abdomen is flat. Bowel sounds are normal. There is no distension.      Palpations: Abdomen is soft.      Tenderness: There is no abdominal tenderness. There is no guarding or rebound.   Musculoskeletal:      Right lower leg: No edema.      Left lower leg: No edema.   Skin:     General: Skin is warm.   Neurological:      Mental Status: She is alert. Mental status is at baseline.   Psychiatric:         " Mood and Affect: Mood normal.         Behavior: Behavior normal.         Thought Content: Thought content normal.       Assessment/Plan:     Rhonda Ahuja is a 45 y.o. year old female who presents to clinic for follow up.    1. Bowel habit changes (Primary)  - Unclear etiology but suspect constipation as the cause of her symptoms given no red flag signs; will order stool studies as below to rule out other etiologies such as IBD given her significant PMH   - Ambulatory referral/consult to Gastroenterology for evaluation prior to colonoscopy screening given her symptoms and change in bowel habits  - Discussed importance of fiber intake and increasing to see if that will help   - Tissue transglutaminase, IgA; Future  - H. pylori antigen, stool; Future  - Pancreatic elastase, fecal; Future  - Fecal fat, qualitative; Future  - WBC, Stool; Future  - Rotavirus antigen, stool; Future  - Adenovirus Molecular Detection, PCR, Non-Blood Stool; Future  - Calprotectin, Stool; Future  - Giardia / Cryptosporidum, EIA; Future  - Stool Exam-Ova,Cysts,Parasites; Future  - Clostridium difficile EIA; Future  - Stool culture; Future    2. Hyperlipidemia  - Not currently on a statin  - Labs as below  - Hemoglobin A1C; Future  - TSH; Future  - Comprehensive Metabolic Panel; Future  - CBC Auto Differential; Future  - Lipid Panel; Future    Follow-up: 1 month    Case discussed with staff: KEYSHA Roth MD  [facility], PGY-3  03/19/2025

## 2025-04-17 DIAGNOSIS — D68.61 ANTIPHOSPHOLIPID SYNDROME: ICD-10-CM

## 2025-04-17 RX ORDER — WARFARIN SODIUM 5 MG/1
TABLET ORAL
Qty: 135 TABLET | Refills: 3 | Status: SHIPPED | OUTPATIENT
Start: 2025-04-17

## 2025-04-29 ENCOUNTER — LAB VISIT (OUTPATIENT)
Dept: LAB | Facility: HOSPITAL | Age: 45
End: 2025-04-29
Attending: INTERNAL MEDICINE
Payer: MEDICAID

## 2025-04-29 ENCOUNTER — ANTI-COAG VISIT (OUTPATIENT)
Dept: CARDIOLOGY | Facility: CLINIC | Age: 45
End: 2025-04-29
Payer: MEDICAID

## 2025-04-29 DIAGNOSIS — D73.5 SPLENIC INFARCT: Primary | ICD-10-CM

## 2025-04-29 DIAGNOSIS — D68.61 ANTIPHOSPHOLIPID SYNDROME: ICD-10-CM

## 2025-04-29 DIAGNOSIS — D73.5 SPLENIC INFARCT: ICD-10-CM

## 2025-04-29 DIAGNOSIS — Z79.01 LONG TERM (CURRENT) USE OF ANTICOAGULANTS: ICD-10-CM

## 2025-04-29 LAB
INR PPP: 1.6 (ref 0.8–1.2)
PROTHROMBIN TIME: 17 SECONDS (ref 9–12.5)

## 2025-04-29 PROCEDURE — 85610 PROTHROMBIN TIME: CPT

## 2025-04-29 PROCEDURE — 93793 ANTICOAG MGMT PT WARFARIN: CPT | Mod: ,,,

## 2025-04-29 PROCEDURE — 36415 COLL VENOUS BLD VENIPUNCTURE: CPT

## 2025-04-29 NOTE — PROGRESS NOTES
INR low from missed dose. Bolus dose today then resume maintenance dose. Recheck INR in 1 week

## 2025-05-27 ENCOUNTER — LAB VISIT (OUTPATIENT)
Dept: LAB | Facility: HOSPITAL | Age: 45
End: 2025-05-27
Attending: INTERNAL MEDICINE
Payer: MEDICAID

## 2025-05-27 ENCOUNTER — ANTI-COAG VISIT (OUTPATIENT)
Dept: CARDIOLOGY | Facility: CLINIC | Age: 45
End: 2025-05-27
Payer: MEDICAID

## 2025-05-27 DIAGNOSIS — Z79.01 LONG TERM (CURRENT) USE OF ANTICOAGULANTS: ICD-10-CM

## 2025-05-27 DIAGNOSIS — D68.61 ANTIPHOSPHOLIPID SYNDROME: ICD-10-CM

## 2025-05-27 DIAGNOSIS — D73.5 SPLENIC INFARCT: ICD-10-CM

## 2025-05-27 DIAGNOSIS — D73.5 SPLENIC INFARCT: Primary | ICD-10-CM

## 2025-05-27 LAB
INR PPP: 1.3 (ref 0.8–1.2)
PROTHROMBIN TIME: 14.3 SECONDS (ref 9–12.5)

## 2025-05-27 PROCEDURE — 93793 ANTICOAG MGMT PT WARFARIN: CPT | Mod: ,,,

## 2025-05-27 PROCEDURE — 36415 COLL VENOUS BLD VENIPUNCTURE: CPT

## 2025-05-27 PROCEDURE — 85610 PROTHROMBIN TIME: CPT

## 2025-05-27 NOTE — PROGRESS NOTES
INR low. Pt reports missing 1-2 doses. Bolus dose today then resume maintenance dose. Recheck INR in 1 week

## 2025-06-09 ENCOUNTER — LAB VISIT (OUTPATIENT)
Dept: LAB | Facility: HOSPITAL | Age: 45
End: 2025-06-09
Attending: INTERNAL MEDICINE
Payer: MEDICAID

## 2025-06-09 DIAGNOSIS — D73.5 SPLENIC INFARCT: ICD-10-CM

## 2025-06-09 DIAGNOSIS — Z79.01 LONG TERM (CURRENT) USE OF ANTICOAGULANTS: ICD-10-CM

## 2025-06-09 DIAGNOSIS — D68.61 ANTIPHOSPHOLIPID SYNDROME: ICD-10-CM

## 2025-06-09 LAB
INR PPP: 4.5 (ref 0.8–1.2)
PROTHROMBIN TIME: 44.1 SECONDS (ref 9–12.5)

## 2025-06-09 PROCEDURE — 36415 COLL VENOUS BLD VENIPUNCTURE: CPT

## 2025-06-09 PROCEDURE — 85610 PROTHROMBIN TIME: CPT

## 2025-06-10 ENCOUNTER — ANTI-COAG VISIT (OUTPATIENT)
Dept: CARDIOLOGY | Facility: CLINIC | Age: 45
End: 2025-06-10
Payer: MEDICAID

## 2025-06-10 ENCOUNTER — PATIENT MESSAGE (OUTPATIENT)
Dept: CARDIOLOGY | Facility: CLINIC | Age: 45
End: 2025-06-10

## 2025-06-10 DIAGNOSIS — Z79.01 LONG TERM (CURRENT) USE OF ANTICOAGULANTS: ICD-10-CM

## 2025-06-10 DIAGNOSIS — D68.61 ANTIPHOSPHOLIPID SYNDROME: ICD-10-CM

## 2025-06-10 DIAGNOSIS — D73.5 SPLENIC INFARCT: Primary | ICD-10-CM

## 2025-06-10 PROCEDURE — 93793 ANTICOAG MGMT PT WARFARIN: CPT | Mod: ,,,

## 2025-06-10 NOTE — PROGRESS NOTES
INR high. Pt reports on menstrual cycle and has been taking tylenol for cramps. Hold dose. Repeat INR in 2 days to make sure decreasing.

## 2025-06-12 ENCOUNTER — ANTI-COAG VISIT (OUTPATIENT)
Dept: CARDIOLOGY | Facility: CLINIC | Age: 45
End: 2025-06-12
Payer: MEDICAID

## 2025-06-12 ENCOUNTER — LAB VISIT (OUTPATIENT)
Dept: LAB | Facility: HOSPITAL | Age: 45
End: 2025-06-12
Attending: INTERNAL MEDICINE
Payer: MEDICAID

## 2025-06-12 DIAGNOSIS — D73.5 SPLENIC INFARCT: Primary | ICD-10-CM

## 2025-06-12 DIAGNOSIS — D73.5 SPLENIC INFARCT: ICD-10-CM

## 2025-06-12 DIAGNOSIS — Z79.01 LONG TERM (CURRENT) USE OF ANTICOAGULANTS: ICD-10-CM

## 2025-06-12 DIAGNOSIS — D68.61 ANTIPHOSPHOLIPID SYNDROME: ICD-10-CM

## 2025-06-12 LAB
INR PPP: 1.5 (ref 0.8–1.2)
PROTHROMBIN TIME: 16.3 SECONDS (ref 9–12.5)

## 2025-06-12 PROCEDURE — 93793 ANTICOAG MGMT PT WARFARIN: CPT | Mod: ,,,

## 2025-06-12 PROCEDURE — 85610 PROTHROMBIN TIME: CPT

## 2025-06-12 PROCEDURE — 36415 COLL VENOUS BLD VENIPUNCTURE: CPT

## 2025-06-18 ENCOUNTER — ANTI-COAG VISIT (OUTPATIENT)
Dept: CARDIOLOGY | Facility: CLINIC | Age: 45
End: 2025-06-18
Payer: MEDICAID

## 2025-06-18 ENCOUNTER — LAB VISIT (OUTPATIENT)
Dept: LAB | Facility: HOSPITAL | Age: 45
End: 2025-06-18
Attending: INTERNAL MEDICINE
Payer: MEDICAID

## 2025-06-18 DIAGNOSIS — D68.61 ANTIPHOSPHOLIPID SYNDROME: ICD-10-CM

## 2025-06-18 DIAGNOSIS — Z79.01 LONG TERM (CURRENT) USE OF ANTICOAGULANTS: ICD-10-CM

## 2025-06-18 DIAGNOSIS — D73.5 SPLENIC INFARCT: Primary | ICD-10-CM

## 2025-06-18 DIAGNOSIS — D73.5 SPLENIC INFARCT: ICD-10-CM

## 2025-06-18 LAB
INR PPP: 3.1 (ref 0.8–1.2)
PROTHROMBIN TIME: 31.1 SECONDS (ref 9–12.5)

## 2025-06-18 PROCEDURE — 36415 COLL VENOUS BLD VENIPUNCTURE: CPT

## 2025-06-18 PROCEDURE — 85610 PROTHROMBIN TIME: CPT

## 2025-06-18 PROCEDURE — 93793 ANTICOAG MGMT PT WARFARIN: CPT | Mod: ,,,

## 2025-06-25 ENCOUNTER — ANTI-COAG VISIT (OUTPATIENT)
Dept: CARDIOLOGY | Facility: CLINIC | Age: 45
End: 2025-06-25
Payer: MEDICAID

## 2025-06-25 ENCOUNTER — LAB VISIT (OUTPATIENT)
Dept: LAB | Facility: HOSPITAL | Age: 45
End: 2025-06-25
Attending: INTERNAL MEDICINE
Payer: MEDICAID

## 2025-06-25 DIAGNOSIS — D73.5 SPLENIC INFARCT: Primary | ICD-10-CM

## 2025-06-25 DIAGNOSIS — Z79.01 LONG TERM (CURRENT) USE OF ANTICOAGULANTS: ICD-10-CM

## 2025-06-25 DIAGNOSIS — D68.61 ANTIPHOSPHOLIPID SYNDROME: ICD-10-CM

## 2025-06-25 DIAGNOSIS — D73.5 SPLENIC INFARCT: ICD-10-CM

## 2025-06-25 LAB
INR PPP: 2.6 (ref 0.8–1.2)
PROTHROMBIN TIME: 26.8 SECONDS (ref 9–12.5)

## 2025-06-25 PROCEDURE — 36415 COLL VENOUS BLD VENIPUNCTURE: CPT

## 2025-06-25 PROCEDURE — 93793 ANTICOAG MGMT PT WARFARIN: CPT | Mod: ,,,

## 2025-06-25 PROCEDURE — 85610 PROTHROMBIN TIME: CPT

## 2025-07-09 ENCOUNTER — ANTI-COAG VISIT (OUTPATIENT)
Dept: CARDIOLOGY | Facility: CLINIC | Age: 45
End: 2025-07-09
Payer: MEDICAID

## 2025-07-09 ENCOUNTER — LAB VISIT (OUTPATIENT)
Dept: LAB | Facility: HOSPITAL | Age: 45
End: 2025-07-09
Attending: INTERNAL MEDICINE
Payer: MEDICAID

## 2025-07-09 DIAGNOSIS — Z79.01 LONG TERM (CURRENT) USE OF ANTICOAGULANTS: ICD-10-CM

## 2025-07-09 DIAGNOSIS — D68.61 ANTIPHOSPHOLIPID SYNDROME: ICD-10-CM

## 2025-07-09 DIAGNOSIS — D73.5 SPLENIC INFARCT: ICD-10-CM

## 2025-07-09 DIAGNOSIS — D73.5 SPLENIC INFARCT: Primary | ICD-10-CM

## 2025-07-09 LAB
INR PPP: 2.4 (ref 0.8–1.2)
PROTHROMBIN TIME: 24.4 SECONDS (ref 9–12.5)

## 2025-07-09 PROCEDURE — 93793 ANTICOAG MGMT PT WARFARIN: CPT | Mod: ,,,

## 2025-07-09 PROCEDURE — 85610 PROTHROMBIN TIME: CPT

## 2025-07-09 PROCEDURE — 36415 COLL VENOUS BLD VENIPUNCTURE: CPT

## 2025-07-30 ENCOUNTER — ANTI-COAG VISIT (OUTPATIENT)
Dept: CARDIOLOGY | Facility: CLINIC | Age: 45
End: 2025-07-30
Payer: MEDICAID

## 2025-07-30 ENCOUNTER — LAB VISIT (OUTPATIENT)
Dept: LAB | Facility: HOSPITAL | Age: 45
End: 2025-07-30
Attending: INTERNAL MEDICINE
Payer: MEDICAID

## 2025-07-30 DIAGNOSIS — D73.5 SPLENIC INFARCT: ICD-10-CM

## 2025-07-30 DIAGNOSIS — D73.5 SPLENIC INFARCT: Primary | ICD-10-CM

## 2025-07-30 DIAGNOSIS — Z79.01 LONG TERM (CURRENT) USE OF ANTICOAGULANTS: ICD-10-CM

## 2025-07-30 DIAGNOSIS — D68.61 ANTIPHOSPHOLIPID SYNDROME: ICD-10-CM

## 2025-07-30 LAB
INR PPP: 2.6 (ref 0.8–1.2)
PROTHROMBIN TIME: 26.7 SECONDS (ref 9–12.5)

## 2025-07-30 PROCEDURE — 93793 ANTICOAG MGMT PT WARFARIN: CPT | Mod: ,,,

## 2025-07-30 PROCEDURE — 36415 COLL VENOUS BLD VENIPUNCTURE: CPT

## 2025-07-30 PROCEDURE — 85610 PROTHROMBIN TIME: CPT

## 2025-08-27 ENCOUNTER — ANTI-COAG VISIT (OUTPATIENT)
Dept: CARDIOLOGY | Facility: CLINIC | Age: 45
End: 2025-08-27
Payer: MEDICAID

## 2025-08-27 ENCOUNTER — LAB VISIT (OUTPATIENT)
Dept: LAB | Facility: HOSPITAL | Age: 45
End: 2025-08-27
Attending: INTERNAL MEDICINE
Payer: MEDICAID

## 2025-08-27 DIAGNOSIS — D68.61 ANTIPHOSPHOLIPID SYNDROME: ICD-10-CM

## 2025-08-27 DIAGNOSIS — D73.5 SPLENIC INFARCT: Primary | ICD-10-CM

## 2025-08-27 DIAGNOSIS — Z79.01 LONG TERM (CURRENT) USE OF ANTICOAGULANTS: ICD-10-CM

## 2025-08-27 DIAGNOSIS — D73.5 SPLENIC INFARCT: ICD-10-CM

## 2025-08-27 LAB
INR PPP: 1.5 (ref 0.8–1.2)
PROTHROMBIN TIME: 15.9 SECONDS (ref 9–12.5)

## 2025-08-27 PROCEDURE — 93793 ANTICOAG MGMT PT WARFARIN: CPT | Mod: ,,,

## 2025-08-27 PROCEDURE — 85610 PROTHROMBIN TIME: CPT

## 2025-08-27 PROCEDURE — 36415 COLL VENOUS BLD VENIPUNCTURE: CPT
